# Patient Record
Sex: FEMALE | Race: BLACK OR AFRICAN AMERICAN | NOT HISPANIC OR LATINO | Employment: OTHER | URBAN - METROPOLITAN AREA
[De-identification: names, ages, dates, MRNs, and addresses within clinical notes are randomized per-mention and may not be internally consistent; named-entity substitution may affect disease eponyms.]

---

## 2017-01-25 DIAGNOSIS — I10 ESSENTIAL HYPERTENSION: ICD-10-CM

## 2017-01-25 DIAGNOSIS — F41.8 DEPRESSION WITH ANXIETY: ICD-10-CM

## 2017-01-25 RX ORDER — LOSARTAN POTASSIUM 50 MG/1
TABLET ORAL
Qty: 90 TABLET | Refills: 0 | Status: SHIPPED | OUTPATIENT
Start: 2017-01-25 | End: 2017-02-16 | Stop reason: SDUPTHER

## 2017-01-25 RX ORDER — PAROXETINE 30 MG/1
TABLET, FILM COATED ORAL
Qty: 90 TABLET | Refills: 0 | Status: SHIPPED | OUTPATIENT
Start: 2017-01-25 | End: 2017-03-02 | Stop reason: SDUPTHER

## 2017-02-16 DIAGNOSIS — M15.9 PRIMARY OSTEOARTHRITIS INVOLVING MULTIPLE JOINTS: ICD-10-CM

## 2017-02-16 DIAGNOSIS — I10 ESSENTIAL HYPERTENSION: ICD-10-CM

## 2017-02-16 RX ORDER — AMLODIPINE BESYLATE 10 MG/1
TABLET ORAL
Qty: 90 TABLET | Refills: 0 | Status: SHIPPED | OUTPATIENT
Start: 2017-02-16 | End: 2017-03-02 | Stop reason: SDUPTHER

## 2017-02-16 RX ORDER — LOSARTAN POTASSIUM 50 MG/1
TABLET ORAL
Qty: 90 TABLET | Refills: 0 | Status: SHIPPED | OUTPATIENT
Start: 2017-02-16 | End: 2017-03-02 | Stop reason: SDUPTHER

## 2017-02-16 RX ORDER — MELOXICAM 15 MG/1
TABLET ORAL
Qty: 90 TABLET | Refills: 0 | Status: SHIPPED | OUTPATIENT
Start: 2017-02-16 | End: 2017-07-19 | Stop reason: SDUPTHER

## 2017-02-23 DIAGNOSIS — F41.1 GAD (GENERALIZED ANXIETY DISORDER): ICD-10-CM

## 2017-02-23 RX ORDER — ALPRAZOLAM 2 MG/1
TABLET ORAL
Qty: 60 TABLET | Refills: 0 | Status: SHIPPED | OUTPATIENT
Start: 2017-02-23 | End: 2017-03-02 | Stop reason: SDUPTHER

## 2017-03-02 ENCOUNTER — OFFICE VISIT (OUTPATIENT)
Dept: INTERNAL MEDICINE | Facility: CLINIC | Age: 67
End: 2017-03-02
Payer: MEDICARE

## 2017-03-02 VITALS
DIASTOLIC BLOOD PRESSURE: 80 MMHG | BODY MASS INDEX: 27.93 KG/M2 | HEIGHT: 65 IN | WEIGHT: 167.63 LBS | HEART RATE: 70 BPM | SYSTOLIC BLOOD PRESSURE: 120 MMHG

## 2017-03-02 DIAGNOSIS — I10 ESSENTIAL HYPERTENSION: Primary | ICD-10-CM

## 2017-03-02 DIAGNOSIS — R73.03 PRE-DIABETES: ICD-10-CM

## 2017-03-02 DIAGNOSIS — F13.20 SEDATIVE, HYPNOTIC OR ANXIOLYTIC DEPENDENCE, UNCOMPLICATED: ICD-10-CM

## 2017-03-02 DIAGNOSIS — N39.41 URGE URINARY INCONTINENCE: ICD-10-CM

## 2017-03-02 DIAGNOSIS — F43.21 ADJUSTMENT DISORDER WITH DEPRESSED MOOD: ICD-10-CM

## 2017-03-02 DIAGNOSIS — F41.1 GAD (GENERALIZED ANXIETY DISORDER): ICD-10-CM

## 2017-03-02 LAB
BILIRUB UR QL STRIP: NEGATIVE
CLARITY UR REFRACT.AUTO: CLEAR
COLOR UR AUTO: NORMAL
GLUCOSE UR QL STRIP: NEGATIVE
HGB UR QL STRIP: NEGATIVE
KETONES UR QL STRIP: NEGATIVE
LEUKOCYTE ESTERASE UR QL STRIP: NEGATIVE
NITRITE UR QL STRIP: NEGATIVE
PH UR STRIP: 5 [PH] (ref 5–8)
PROT UR QL STRIP: NEGATIVE
SP GR UR STRIP: 1.02 (ref 1–1.03)
URN SPEC COLLECT METH UR: NORMAL
UROBILINOGEN UR STRIP-ACNC: NEGATIVE EU/DL

## 2017-03-02 PROCEDURE — 99999 PR PBB SHADOW E&M-EST. PATIENT-LVL III: CPT | Mod: PBBFAC,,, | Performed by: INTERNAL MEDICINE

## 2017-03-02 PROCEDURE — 3074F SYST BP LT 130 MM HG: CPT | Mod: S$GLB,,, | Performed by: INTERNAL MEDICINE

## 2017-03-02 PROCEDURE — 1126F AMNT PAIN NOTED NONE PRSNT: CPT | Mod: S$GLB,,, | Performed by: INTERNAL MEDICINE

## 2017-03-02 PROCEDURE — 1157F ADVNC CARE PLAN IN RCRD: CPT | Mod: S$GLB,,, | Performed by: INTERNAL MEDICINE

## 2017-03-02 PROCEDURE — 87086 URINE CULTURE/COLONY COUNT: CPT

## 2017-03-02 PROCEDURE — 3079F DIAST BP 80-89 MM HG: CPT | Mod: S$GLB,,, | Performed by: INTERNAL MEDICINE

## 2017-03-02 PROCEDURE — 1159F MED LIST DOCD IN RCRD: CPT | Mod: S$GLB,,, | Performed by: INTERNAL MEDICINE

## 2017-03-02 PROCEDURE — 99214 OFFICE O/P EST MOD 30 MIN: CPT | Mod: S$GLB,,, | Performed by: INTERNAL MEDICINE

## 2017-03-02 PROCEDURE — 1160F RVW MEDS BY RX/DR IN RCRD: CPT | Mod: S$GLB,,, | Performed by: INTERNAL MEDICINE

## 2017-03-02 PROCEDURE — 81003 URINALYSIS AUTO W/O SCOPE: CPT

## 2017-03-02 RX ORDER — PAROXETINE 30 MG/1
TABLET, FILM COATED ORAL
Qty: 90 TABLET | Refills: 2 | Status: SHIPPED | OUTPATIENT
Start: 2017-03-02 | End: 2017-03-02 | Stop reason: DRUGHIGH

## 2017-03-02 RX ORDER — PAROXETINE HYDROCHLORIDE 40 MG/1
40 TABLET, FILM COATED ORAL DAILY
Qty: 90 TABLET | Refills: 2 | Status: SHIPPED | OUTPATIENT
Start: 2017-03-02 | End: 2017-11-08 | Stop reason: SDUPTHER

## 2017-03-02 RX ORDER — LOSARTAN POTASSIUM 50 MG/1
50 TABLET ORAL DAILY
Qty: 90 TABLET | Refills: 2 | Status: SHIPPED | OUTPATIENT
Start: 2017-03-02 | End: 2017-12-19 | Stop reason: SDUPTHER

## 2017-03-02 RX ORDER — AMLODIPINE BESYLATE 10 MG/1
10 TABLET ORAL DAILY
Qty: 90 TABLET | Refills: 2 | Status: SHIPPED | OUTPATIENT
Start: 2017-03-02 | End: 2017-12-11 | Stop reason: SDUPTHER

## 2017-03-02 RX ORDER — ALPRAZOLAM 2 MG/1
2 TABLET ORAL 2 TIMES DAILY PRN
Qty: 60 TABLET | Refills: 5 | Status: SHIPPED | OUTPATIENT
Start: 2017-03-02 | End: 2017-09-11 | Stop reason: SDUPTHER

## 2017-03-02 NOTE — PROGRESS NOTES
Subjective:       Patient ID: Nicole Jacques is a 67 y.o. female.    Chief Complaint: Annual Exam    HPI Comments: Last seen by me 11 months ago. Had missed an appointment with me, and had follow up with ASHLI Turner 10/16 instead. Presents for f/u chronic medical conditions with no new complaints. Taking meds as prescribed. Home blood pressure readings are generally normal.    PMH: , misc x 1.    Hypertension.  Pre-Diabetes, HbA1c up to 6.3%.  GERD, EGD  only showed hiatal hernia.  Depression with Anxiety.    Lumbar Spondylosis.  Sigmoid Diverticulosis.     Colonoscopy 11/15 normal. Mammogram normal . Pelvic exam 10/16. BMD normal 12/15. Eye exam . Flu shot 10/16. EKG normal . Pneumovax . Prevnar 8/15. Has had Zostavax. Labs 10/16: CBC normal, CMP normal, HbA1c 6.0%, Vit D 32, TChol 207, TG 82, HDL 66, .    SOCIAL: No tobacco,  smokes. Social alcohol. No illicit drug usage.  with 2 adult children who live locally. Retired  at [employer].     PSH: BTL, Hysterectomy due to heavy menses, ovaries remain. Left knee arthroscopic surgery due to injury kick boxing. Appendectomy. Bilateral Cataract extractions.     FMH: Father  age 81 with bladder cancer and dementia. Mother  age 78 with hypertension and renal failure, and CHF, possible osteoporosis. Diabetes in grandmothers. Anxiety - mother, sister. Colon cancer in maternal uncle.    Allergies: Bactrim.    Medications: list reviewed and reconciled.             Review of Systems   Constitutional: Negative for activity change, appetite change, fatigue, fever and unexpected weight change.   HENT: Negative for congestion, hearing loss, rhinorrhea, sneezing, sore throat, trouble swallowing and voice change.    Eyes: Negative for pain, redness and visual disturbance.   Respiratory: Negative for cough, chest tightness, shortness of breath and wheezing.    Cardiovascular: Negative for chest pain,  palpitations and leg swelling.   Gastrointestinal: Negative for abdominal pain, blood in stool, constipation, diarrhea, nausea and vomiting.        Infrequent reflux symptoms, takes Omeprazole as needed.    Genitourinary: Positive for urgency. Negative for difficulty urinating, dysuria, flank pain, frequency and hematuria.        Chronic urge incontinence, c/o cost of Ditropan, Detrol and Vesicare. Using an OTC herbal supplement.   Musculoskeletal: Positive for arthralgias. Negative for back pain, joint swelling, myalgias and neck pain.        Uses Meloxicam intermittently, sometimes just Tylenol.   Skin: Negative for rash and wound.   Neurological: Negative for dizziness, syncope, facial asymmetry, speech difficulty, weakness, numbness and headaches.   Hematological: Negative for adenopathy. Does not bruise/bleed easily.   Psychiatric/Behavioral: Negative for confusion, dysphoric mood, sleep disturbance and suicidal ideas. The patient is nervous/anxious.         Depression is stable on current medication, but anxiety persists. She would like to try a dose adjustment of Paxil.       Objective:    /80, Pulse 70, Wt 167.6 lbs  Physical Exam   Constitutional: She is oriented to person, place, and time. She appears well-developed and well-nourished. No distress.   HENT:   Head: Normocephalic and atraumatic.   Right Ear: External ear normal.   Left Ear: External ear normal.   Nose: Nose normal.   Mouth/Throat: Oropharynx is clear and moist. No oropharyngeal exudate.   Eyes: Conjunctivae and EOM are normal. Pupils are equal, round, and reactive to light. Right conjunctiva is not injected. Left conjunctiva is not injected. No scleral icterus.   Neck: Normal range of motion. Neck supple. No JVD present. Carotid bruit is not present. No thyromegaly present.   Cardiovascular: Normal rate, regular rhythm and normal heart sounds.  Exam reveals no gallop and no friction rub.    No murmur heard.  Pulmonary/Chest: Effort  normal and breath sounds normal. No respiratory distress. She has no wheezes. She has no rhonchi. She has no rales.   Abdominal: Soft. Bowel sounds are normal. She exhibits no distension and no mass. There is no hepatosplenomegaly. There is no tenderness. There is no CVA tenderness. No hernia.   Musculoskeletal: Normal range of motion. She exhibits no edema, tenderness or deformity.   Lymphadenopathy:     She has no cervical adenopathy.   Neurological: She is alert and oriented to person, place, and time. She has normal strength. No cranial nerve deficit. She exhibits normal muscle tone. Coordination and gait normal.   Mild essential tremor.   Skin: Skin is warm and dry. No lesion and no rash noted. She is not diaphoretic. No cyanosis or erythema. No pallor. Nails show no clubbing.   Psychiatric: She has a normal mood and affect. Her behavior is normal. Judgment and thought content normal.   Vitals reviewed.      Assessment:       1. Essential hypertension    2. Pre-diabetes    3. Urge urinary incontinence    4. ROSALIA (generalized anxiety disorder)    5. Adjustment disorder with depressed mood    6. Sedative, hypnotic or anxiolytic dependence, uncomplicated        Plan:       Essential hypertension controlled.  -     amlodipine (NORVASC) 10 MG tablet; Take 1 tablet (10 mg total) by mouth once daily.  Dispense: 90 tablet; Refill: 2  -     losartan (COZAAR) 50 MG tablet; Take 1 tablet (50 mg total) by mouth once daily.  Dispense: 90 tablet; Refill: 2    Pre-diabetes - continue diet and exercise.     Urge urinary incontinence  -     Urinalysis  -     Urine culture    ROSALIA (generalized anxiety disorder)  -     alprazolam (XANAX) 2 MG Tab; Take 1 tablet (2 mg total) by mouth 2 (two) times daily as needed.  Dispense: 60 tablet; Refill: 5    Adjustment disorder with depressed mood  -     Discontinue: paroxetine (PAXIL) 30 MG tablet; TAKE 1 TABLET ONE TIME DAILY  Dispense: 90 tablet; Refill: 2  -     paroxetine (PAXIL) 40 MG  tablet; Take 1 tablet (40 mg total) by mouth once daily.  Dispense: 90 tablet; Refill: 2    Sedative, hypnotic or anxiolytic dependence, uncomplicated

## 2017-03-02 NOTE — MR AVS SNAPSHOT
[facility]  [address]  [facility] [address]  Phone: [phone]  Fax: [phone]                  Nicole Jacques   3/2/2017 1:00 PM   Office Visit    Description:  Female : 1950   Provider:  Zara Sorensen MD   Department:  [facility]           Reason for Visit     Annual Exam           Diagnoses this Visit        Comments    Essential hypertension    -  Primary     Pre-diabetes         Urge urinary incontinence         ROSALIA (generalized anxiety disorder)                To Do List           Goals (5 Years of Data)     None      Follow-Up and Disposition     Return in about 6 months (around 2017).       These Medications        Disp Refills Start End    alprazolam (XANAX) 2 MG Tab 60 tablet 5 3/2/2017     Take 1 tablet (2 mg total) by mouth 2 (two) times daily as needed. - Oral    Pharmacy: [facility] - [place] [id number] [facility] Ph #: [phone]         [facility] On Call     [facility] On Call Nurse Care Line -  Assistance  Registered nurses in the [facility] On Call Center provide clinical advisement, health education, appointment booking, and other advisory services.  Call for this free service at [phone].             Medications           Message regarding Medications     Verify the changes and/or additions to your medication regime listed below are the same as discussed with your clinician today.  If any of these changes or additions are incorrect, please notify your healthcare provider.        CHANGE how you are taking these medications     Start Taking Instead of    alprazolam (XANAX) 2 MG Tab alprazolam (XANAX) 2 MG Tab    Dosage:  Take 1 tablet (2 mg total) by mouth 2 (two) times daily as needed. Dosage:  TAKE 1 TABLET BY MOUTH TWICE DAILY    Reason for Change:  Reorder       STOP taking these medications     phenylephrine-acetaminophen 5-325 mg Tab            Verify that the below list of  medications is an accurate representation of the medications you are currently taking.  If none reported, the list may be blank. If incorrect, please contact your healthcare provider. Carry this list with you in case of emergency.           Current Medications     alprazolam (XANAX) 2 MG Tab Take 1 tablet (2 mg total) by mouth 2 (two) times daily as needed.    amlodipine (NORVASC) 10 MG tablet TAKE 1 TABLET EVERY DAY    ascorbic acid (VITAMIN C) 1000 MG tablet Take 2 tablets by mouth.    co-enzyme Q-10 30 mg capsule     estradiol (ESTRACE) 1 MG tablet TAKE 1 TABLET EVERY DAY    losartan (COZAAR) 50 MG tablet TAKE 1 TABLET EVERY DAY    LUBRICANT EYE DROPS, GLYC-PG, 1-0.3 % Drop     meloxicam (MOBIC) 15 MG tablet TAKE 1 TABLET ONE TIME DAILY    MULTIVITAMIN (MULTIPLE VITAMIN ESSENTIAL ORAL) Take 1 tablet by mouth.    omega-3 fatty acids (FISH OIL) 500 mg Cap Take by mouth.    omeprazole (PRILOSEC) 20 MG capsule TAKE 1 CAPSULE (20 MG TOTAL) BY MOUTH ONCE DAILY.    paroxetine (PAXIL) 30 MG tablet TAKE 1 TABLET ONE TIME DAILY    vitamin A palmitate-vitamin D2 10,000-400 unit Tab            Clinical Reference Information           Your Vitals Were     BP                   120/80           Blood Pressure          Most Recent Value    BP  120/80      Allergies as of 3/2/2017     Sulfa (Sulfonamide Antibiotics)    Sulfamethoxazole-trimethoprim      Immunizations Administered on Date of Encounter - 3/2/2017     None      Orders Placed During Today's Visit      Normal Orders This Visit    Urinalysis     Urine culture       Language Assistance Services     ATTENTION: Language assistance services are available, free of charge. Please call [phone].      ATENCIÓN: Si habla qianañol, tiene a leroy disposición servicios gratuitos de asistencia lingüística. Llame al [phone].     CHÚ Ý: N?u b?n nói Ti?ng Vi?t, có các d?ch v? h? tr? ngôn ng? mi?n phí dành cho b?n. G?i s? [phone].         [facility]  complies with applicable Federal civil rights laws and does not discriminate on the basis of race, color, national origin, age, disability, or sex.

## 2017-03-03 LAB — BACTERIA UR CULT: NO GROWTH

## 2017-03-22 ENCOUNTER — PATIENT MESSAGE (OUTPATIENT)
Dept: INTERNAL MEDICINE | Facility: CLINIC | Age: 67
End: 2017-03-22

## 2017-03-31 ENCOUNTER — TELEPHONE (OUTPATIENT)
Dept: INTERNAL MEDICINE | Facility: CLINIC | Age: 67
End: 2017-03-31

## 2017-03-31 DIAGNOSIS — N39.41 URGE URINARY INCONTINENCE: Primary | ICD-10-CM

## 2017-03-31 RX ORDER — OXYBUTYNIN CHLORIDE 5 MG/1
5 TABLET ORAL 2 TIMES DAILY
Qty: 180 TABLET | Refills: 3 | Status: SHIPPED | OUTPATIENT
Start: 2017-03-31 | End: 2017-10-10

## 2017-03-31 NOTE — TELEPHONE ENCOUNTER
----- Message from Cheri Keller sent at 3/29/2017  4:37 PM CDT -----  Contact: Patient  Patient would like to get medical advice.  Symptoms (please be specific):  incontinence  How long has patient had these symptoms:    Pharmacy name and phone #:  [facility] - [place], [place] - [address] [facility] [phone] (Phone) [phone] (Fax)  Any drug allergies:  Please see chart.   Comments:  The patient would like oxybutynin 5 mg sent to [facility].      Thanks!

## 2017-03-31 NOTE — TELEPHONE ENCOUNTER
Pt state the oxybutynin 5 mg  is a preferred med for [employer] can you please send a RX for it, she could not pay for it previously pl adv.

## 2017-06-16 ENCOUNTER — OFFICE VISIT (OUTPATIENT)
Dept: OPTOMETRY | Facility: CLINIC | Age: 67
End: 2017-06-16
Payer: MEDICARE

## 2017-06-16 DIAGNOSIS — Z96.1 PSEUDOPHAKIA OF BOTH EYES: ICD-10-CM

## 2017-06-16 DIAGNOSIS — Z13.5 GLAUCOMA SCREENING: ICD-10-CM

## 2017-06-16 DIAGNOSIS — H52.4 PRESBYOPIA OU: ICD-10-CM

## 2017-06-16 DIAGNOSIS — H02.403 PTOSIS, BOTH EYELIDS: Primary | ICD-10-CM

## 2017-06-16 PROCEDURE — 92014 COMPRE OPH EXAM EST PT 1/>: CPT | Mod: S$GLB,,, | Performed by: OPTOMETRIST

## 2017-06-16 PROCEDURE — 92015 DETERMINE REFRACTIVE STATE: CPT | Mod: S$GLB,,, | Performed by: OPTOMETRIST

## 2017-06-16 PROCEDURE — 99999 PR PBB SHADOW E&M-EST. PATIENT-LVL II: CPT | Mod: PBBFAC,,, | Performed by: OPTOMETRIST

## 2017-06-16 RX ORDER — RESVER/WINE/BFL/GRPSD/PC/C/POM 200MG-60MG
5000 CAPSULE ORAL EVERY MORNING
COMMUNITY
Start: 2017-04-20

## 2017-06-16 RX ORDER — B-COMPLEX WITH VITAMIN C
1 TABLET ORAL EVERY MORNING
COMMUNITY
Start: 2017-04-20

## 2017-06-16 NOTE — PROGRESS NOTES
HPI     DLS: 6/15/2016  Pt states no va changes  +itching   +Dry eyes   Denies f/f    Liquid Tears ou BID/TID        Last edited by Maria Guadalupe Cross on 6/16/2017  1:09 PM. (History)        ROS     Positive for: Gastrointestinal, Cardiovascular, Eyes    Negative for: Constitutional, Neurological, Skin, Genitourinary,   Musculoskeletal, HENT, Endocrine, Respiratory, Psychiatric, Allergic/Imm,   Heme/Lymph    Last edited by Scott Box, OD on 6/16/2017  1:20 PM. (History)        Assessment /Plan     For exam results, see Encounter Report.    Ptosis, both eyelids    Pseudophakia of both eyes    Glaucoma screening    Presbyopia OU          1. Mild pco sp pciol OU--pt happy w otc readers  2. Dry eyes--advised SYS BAL or SOOTHE XP ATs QID  3. Family History of Glaucoma.  Advised yearly exam   4. Ptosis OU--pt wishes surgery if is a candidate    PLAN:    1. Eyelid consult--ptosis repair eval  2. rtc 1 yr to me

## 2017-07-18 ENCOUNTER — CLINICAL SUPPORT (OUTPATIENT)
Dept: OPHTHALMOLOGY | Facility: CLINIC | Age: 67
End: 2017-07-18
Payer: MEDICARE

## 2017-07-18 ENCOUNTER — INITIAL CONSULT (OUTPATIENT)
Dept: OPHTHALMOLOGY | Facility: CLINIC | Age: 67
End: 2017-07-18
Payer: MEDICARE

## 2017-07-18 DIAGNOSIS — H02.423 MYOGENIC PTOSIS OF BILATERAL EYELIDS: ICD-10-CM

## 2017-07-18 DIAGNOSIS — H02.403 PTOSIS OF EYELID, BILATERAL: Primary | ICD-10-CM

## 2017-07-18 DIAGNOSIS — H02.839 DERMATOCHALASIS OF EYELID: ICD-10-CM

## 2017-07-18 PROCEDURE — 99999 PR PBB SHADOW E&M-EST. PATIENT-LVL II: CPT | Mod: PBBFAC,,, | Performed by: OPHTHALMOLOGY

## 2017-07-18 PROCEDURE — 92285 EXTERNAL OCULAR PHOTOGRAPHY: CPT | Mod: S$GLB,,, | Performed by: OPHTHALMOLOGY

## 2017-07-18 PROCEDURE — 92082 INTERMEDIATE VISUAL FIELD XM: CPT | Mod: S$GLB,,, | Performed by: OPHTHALMOLOGY

## 2017-07-18 PROCEDURE — 92014 COMPRE OPH EXAM EST PT 1/>: CPT | Mod: S$GLB,,, | Performed by: OPHTHALMOLOGY

## 2017-07-18 RX ORDER — NEOMYCIN/POLYMYXIN B/PRAMOXINE 3.5-10K-1
CREAM (GRAM) TOPICAL
COMMUNITY
Start: 2017-07-10 | End: 2017-11-12

## 2017-07-18 RX ORDER — ACETAMINOPHEN 500MG/15ML
LIQUID (ML) ORAL
COMMUNITY
Start: 2017-07-10 | End: 2017-11-12

## 2017-07-18 RX ORDER — ACETAMINOPHEN 650 MG
TABLET, EXTENDED RELEASE ORAL
COMMUNITY
Start: 2017-07-10 | End: 2017-07-18 | Stop reason: SDUPTHER

## 2017-07-18 NOTE — PROGRESS NOTES
HPI     Ptosis    Additional comments: Pt here for ptosis xochitl Box            Comments   Ptosis xochitl Box   Pt states she first noticed her lids drooping in pictures, and eventually   they have gotten worse and she now notices them especially when watching   tv. Pt states when she raises her lids her vision increases significantly  Pt denies eye pain  Meds: AT prn        Last edited by Pavithra Maher, PCT on 7/18/2017  1:34 PM. (History)            Assessment /Plan     For exam results, see Encounter Report.    Ptosis of eyelid, bilateral  -     Goldmann Perimetry - OU - Intermediate - Both Eyes  -     External Photography - OU - Both Eyes    Myogenic ptosis of bilateral eyelids    Dermatochalasis of eyelid      Plan for bilateral MMCR 10/12 along with conservative blepharoplasty.     Informed consent obtained after extensive risks/benefits/alternatives were discussed with the patient including but not limited to pain, bleeding, infection, ocular injury, loss of the eye, asymmetry, need for revision in future, scarring.  Alternatives such as waiting were discussed.  All questions were answered.      Hold ASA, NSAIDS, and fish oil 5 to 7 days prior to procedure.    Return for surgery

## 2017-07-18 NOTE — LETTER
July 18, 2017      Zara Sorensen MD  [address]  [facility] [address]           [facility]  [address]  [facility] [address]  Phone: [phone]  Fax: [phone]          Patient: Nicole Jacques   MR Number: [id number]   YOB: 1950   Date of Visit: 7/18/2017       Dear Dr. Zara Sorensen:    Thank you for referring Nicole Jacques to me for evaluation. Attached you will find relevant portions of my assessment and plan of care.    If you have questions, please do not hesitate to call me. I look forward to following Nicole Jacques along with you.    Sincerely,    Jamila Berman MD    Enclosure  CC:  No Recipients    If you would like to receive this communication electronically, please contact [email or web address] or [phone] to request more information on [employer] Link access.    For providers and/or their staff who would like to refer a patient to [facility], please contact us through our one-stop-shop provider referral line, [facility], at [phone].    If you feel you have received this communication in error or would no longer like to receive these types of communications, please e-mail [email or web address]

## 2017-07-19 DIAGNOSIS — M15.9 PRIMARY OSTEOARTHRITIS INVOLVING MULTIPLE JOINTS: ICD-10-CM

## 2017-07-19 RX ORDER — MELOXICAM 15 MG/1
TABLET ORAL
Qty: 90 TABLET | Refills: 0 | Status: SHIPPED | OUTPATIENT
Start: 2017-07-19 | End: 2017-09-28 | Stop reason: SDUPTHER

## 2017-08-07 ENCOUNTER — TELEPHONE (OUTPATIENT)
Dept: OPHTHALMOLOGY | Facility: CLINIC | Age: 67
End: 2017-08-07

## 2017-08-07 DIAGNOSIS — H02.839 DERMATOCHALASIS OF EYELID: ICD-10-CM

## 2017-08-07 DIAGNOSIS — H02.423 MYOGENIC PTOSIS OF BILATERAL EYELIDS: ICD-10-CM

## 2017-08-07 DIAGNOSIS — H02.403 PTOSIS OF EYELID, BILATERAL: Primary | ICD-10-CM

## 2017-09-07 ENCOUNTER — OFFICE VISIT (OUTPATIENT)
Dept: INTERNAL MEDICINE | Facility: CLINIC | Age: 67
End: 2017-09-07
Payer: MEDICARE

## 2017-09-07 ENCOUNTER — TELEPHONE (OUTPATIENT)
Dept: INTERNAL MEDICINE | Facility: CLINIC | Age: 67
End: 2017-09-07

## 2017-09-07 ENCOUNTER — LAB VISIT (OUTPATIENT)
Dept: LAB | Facility: HOSPITAL | Age: 67
End: 2017-09-07
Attending: PHYSICIAN ASSISTANT
Payer: MEDICARE

## 2017-09-07 VITALS
BODY MASS INDEX: 28.17 KG/M2 | HEART RATE: 64 BPM | DIASTOLIC BLOOD PRESSURE: 74 MMHG | HEIGHT: 65 IN | WEIGHT: 169.06 LBS | SYSTOLIC BLOOD PRESSURE: 112 MMHG

## 2017-09-07 DIAGNOSIS — I10 HYPERTENSION, ESSENTIAL: ICD-10-CM

## 2017-09-07 DIAGNOSIS — R73.03 PRE-DIABETES: ICD-10-CM

## 2017-09-07 DIAGNOSIS — E55.9 VITAMIN D DEFICIENCY: ICD-10-CM

## 2017-09-07 DIAGNOSIS — H69.93 EUSTACHIAN TUBE DYSFUNCTION, BILATERAL: ICD-10-CM

## 2017-09-07 DIAGNOSIS — R32 URINARY INCONTINENCE, UNSPECIFIED TYPE: Primary | ICD-10-CM

## 2017-09-07 LAB
25(OH)D3+25(OH)D2 SERPL-MCNC: 51 NG/ML
ALBUMIN SERPL BCP-MCNC: 3.9 G/DL
ALP SERPL-CCNC: 49 U/L
ALT SERPL W/O P-5'-P-CCNC: 18 U/L
ANION GAP SERPL CALC-SCNC: 8 MMOL/L
AST SERPL-CCNC: 20 U/L
BASOPHILS # BLD AUTO: 0.01 K/UL
BASOPHILS NFR BLD: 0.2 %
BILIRUB SERPL-MCNC: 0.4 MG/DL
BUN SERPL-MCNC: 23 MG/DL
CALCIUM SERPL-MCNC: 9.4 MG/DL
CHLORIDE SERPL-SCNC: 106 MMOL/L
CHOLEST SERPL-MCNC: 199 MG/DL
CHOLEST/HDLC SERPL: 2.7 {RATIO}
CO2 SERPL-SCNC: 26 MMOL/L
CREAT SERPL-MCNC: 0.8 MG/DL
DIFFERENTIAL METHOD: NORMAL
EOSINOPHIL # BLD AUTO: 0.1 K/UL
EOSINOPHIL NFR BLD: 2 %
ERYTHROCYTE [DISTWIDTH] IN BLOOD BY AUTOMATED COUNT: 13.4 %
EST. GFR  (AFRICAN AMERICAN): >60 ML/MIN/1.73 M^2
EST. GFR  (NON AFRICAN AMERICAN): >60 ML/MIN/1.73 M^2
ESTIMATED AVG GLUCOSE: 114 MG/DL
GLUCOSE SERPL-MCNC: 96 MG/DL
HBA1C MFR BLD HPLC: 5.6 %
HCT VFR BLD AUTO: 38.2 %
HDLC SERPL-MCNC: 75 MG/DL
HDLC SERPL: 37.7 %
HGB BLD-MCNC: 12.6 G/DL
LDLC SERPL CALC-MCNC: 107.8 MG/DL
LYMPHOCYTES # BLD AUTO: 1.6 K/UL
LYMPHOCYTES NFR BLD: 31.2 %
MCH RBC QN AUTO: 30.1 PG
MCHC RBC AUTO-ENTMCNC: 33 G/DL
MCV RBC AUTO: 91 FL
MONOCYTES # BLD AUTO: 0.4 K/UL
MONOCYTES NFR BLD: 8.4 %
NEUTROPHILS # BLD AUTO: 3 K/UL
NEUTROPHILS NFR BLD: 58 %
NONHDLC SERPL-MCNC: 124 MG/DL
PLATELET # BLD AUTO: 224 K/UL
PMV BLD AUTO: 11.5 FL
POTASSIUM SERPL-SCNC: 4.5 MMOL/L
PROT SERPL-MCNC: 7.6 G/DL
RBC # BLD AUTO: 4.18 M/UL
SODIUM SERPL-SCNC: 140 MMOL/L
TRIGL SERPL-MCNC: 81 MG/DL
TSH SERPL DL<=0.005 MIU/L-ACNC: 1.16 UIU/ML
WBC # BLD AUTO: 5.09 K/UL

## 2017-09-07 PROCEDURE — 3074F SYST BP LT 130 MM HG: CPT | Mod: S$GLB,,, | Performed by: PHYSICIAN ASSISTANT

## 2017-09-07 PROCEDURE — 85025 COMPLETE CBC W/AUTO DIFF WBC: CPT

## 2017-09-07 PROCEDURE — 82306 VITAMIN D 25 HYDROXY: CPT

## 2017-09-07 PROCEDURE — 99214 OFFICE O/P EST MOD 30 MIN: CPT | Mod: S$GLB,,, | Performed by: PHYSICIAN ASSISTANT

## 2017-09-07 PROCEDURE — 1159F MED LIST DOCD IN RCRD: CPT | Mod: S$GLB,,, | Performed by: PHYSICIAN ASSISTANT

## 2017-09-07 PROCEDURE — 1126F AMNT PAIN NOTED NONE PRSNT: CPT | Mod: S$GLB,,, | Performed by: PHYSICIAN ASSISTANT

## 2017-09-07 PROCEDURE — 84443 ASSAY THYROID STIM HORMONE: CPT

## 2017-09-07 PROCEDURE — 80053 COMPREHEN METABOLIC PANEL: CPT

## 2017-09-07 PROCEDURE — 83036 HEMOGLOBIN GLYCOSYLATED A1C: CPT

## 2017-09-07 PROCEDURE — 80061 LIPID PANEL: CPT

## 2017-09-07 PROCEDURE — 3008F BODY MASS INDEX DOCD: CPT | Mod: S$GLB,,, | Performed by: PHYSICIAN ASSISTANT

## 2017-09-07 PROCEDURE — 36415 COLL VENOUS BLD VENIPUNCTURE: CPT

## 2017-09-07 PROCEDURE — 99499 UNLISTED E&M SERVICE: CPT | Mod: S$GLB,,, | Performed by: PHYSICIAN ASSISTANT

## 2017-09-07 PROCEDURE — 99999 PR PBB SHADOW E&M-EST. PATIENT-LVL V: CPT | Mod: PBBFAC,,, | Performed by: PHYSICIAN ASSISTANT

## 2017-09-07 PROCEDURE — 3078F DIAST BP <80 MM HG: CPT | Mod: S$GLB,,, | Performed by: PHYSICIAN ASSISTANT

## 2017-09-07 NOTE — PATIENT INSTRUCTIONS
Treating Incontinence in Women: Non-surgical Methods    The best treatment for you will depend on the type of incontinence you have. Your symptoms, age, and any underlying problems that are found also affect your treatment. While some types of incontinence may eventually require surgery, non-surgical treatments may be effective in many cases. Non-surgical treatments include lifestyle changes, muscle-strengthening exercises, and medications.  Treatment recommendations for stress urinary incontinence  Guidelines from the American College of Physicians for treating stress urinary incontinence include:  · Pelvic floor strengthening exercises (called Kegel exercises)  · Kegel exercises and bladder training  · Medications for urgency incontinence if bladder training has not helped  · Lifestyle changes such as weight loss and increased activity if incontinence is due to being overweight  Lifestyle changes  · Quitting smoking. Smoking can lead to a chronic cough that strains pelvic floor muscles. Smoking may also damage the bladder and urethra.  · Losing weight. Excess weight puts extra pressure on the pelvic floor muscles. Exercising and eating right can help you lose weight. This helps other treatments work better.  · Making certain diet changes. Some foods may make you need to urinate more, so it may be good to avoid them. These include caffeinated drinks and alcohol. Ask your doctor whether these or other diet changes might be helpful.  Kegel exercises  Kegel exercises help strengthen the pelvic floor muscles. The pelvic floor muscles act as a sling to help hold the bladder and urethra in place. These muscles also help keep the urethra closed. Weak pelvic floor muscles may allow urine to leak. To strengthen the pelvic floor muscles, do Kegel exercises daily. In a few months, the muscles will be stronger and tighter. This can help prevent urine leakage.  Date Last Reviewed: 1/2/2015  © 1427-2444 [employer] [address] [place] [address]. All rights reserved. This information is not intended as a substitute for professional medical care. Always follow your healthcare professional's instructions.
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
## 2017-09-07 NOTE — TELEPHONE ENCOUNTER
Patient has pre-op clearance she would like completed.  She brought in a form.  Please call her to let her know if she needs an office visit with Dr. Sorensen first.  She has an upcoming eye surgery planned 10/27/17.  I will bring the form to you Jackson

## 2017-09-11 DIAGNOSIS — F41.1 GAD (GENERALIZED ANXIETY DISORDER): ICD-10-CM

## 2017-09-12 RX ORDER — ALPRAZOLAM 2 MG/1
TABLET ORAL
Qty: 60 TABLET | Refills: 2 | Status: SHIPPED | OUTPATIENT
Start: 2017-09-12 | End: 2017-12-09 | Stop reason: SDUPTHER

## 2017-09-13 ENCOUNTER — PATIENT MESSAGE (OUTPATIENT)
Dept: INTERNAL MEDICINE | Facility: CLINIC | Age: 67
End: 2017-09-13

## 2017-09-15 ENCOUNTER — TELEPHONE (OUTPATIENT)
Dept: OPHTHALMOLOGY | Facility: CLINIC | Age: 67
End: 2017-09-15

## 2017-09-15 DIAGNOSIS — H02.423 MYOGENIC PTOSIS OF BILATERAL EYELIDS: ICD-10-CM

## 2017-09-15 DIAGNOSIS — H02.403 PTOSIS OF EYELID, BILATERAL: Primary | ICD-10-CM

## 2017-09-15 DIAGNOSIS — H02.839 DERMATOCHALASIS OF EYELID: ICD-10-CM

## 2017-09-28 DIAGNOSIS — M15.9 PRIMARY OSTEOARTHRITIS INVOLVING MULTIPLE JOINTS: ICD-10-CM

## 2017-09-28 DIAGNOSIS — K21.9 GASTROESOPHAGEAL REFLUX DISEASE, ESOPHAGITIS PRESENCE NOT SPECIFIED: ICD-10-CM

## 2017-09-28 RX ORDER — OMEPRAZOLE 20 MG/1
CAPSULE, DELAYED RELEASE ORAL
Qty: 90 CAPSULE | Refills: 0 | Status: SHIPPED | OUTPATIENT
Start: 2017-09-28 | End: 2017-12-11 | Stop reason: SDUPTHER

## 2017-09-28 RX ORDER — MELOXICAM 15 MG/1
TABLET ORAL
Qty: 90 TABLET | Refills: 0 | Status: SHIPPED | OUTPATIENT
Start: 2017-09-28 | End: 2017-12-11 | Stop reason: SDUPTHER

## 2017-10-06 ENCOUNTER — TELEPHONE (OUTPATIENT)
Dept: INTERNAL MEDICINE | Facility: CLINIC | Age: 67
End: 2017-10-06

## 2017-10-09 ENCOUNTER — TELEPHONE (OUTPATIENT)
Dept: INTERNAL MEDICINE | Facility: CLINIC | Age: 67
End: 2017-10-09

## 2017-10-09 NOTE — TELEPHONE ENCOUNTER
----- Message from Syeda Phillips sent at 10/9/2017 10:31 AM CDT -----  Contact: pt [phone]  Pt is returning call from office

## 2017-10-10 ENCOUNTER — OFFICE VISIT (OUTPATIENT)
Dept: UROLOGY | Facility: CLINIC | Age: 67
End: 2017-10-10
Payer: MEDICARE

## 2017-10-10 VITALS
SYSTOLIC BLOOD PRESSURE: 119 MMHG | BODY MASS INDEX: 28.36 KG/M2 | WEIGHT: 170.19 LBS | DIASTOLIC BLOOD PRESSURE: 75 MMHG | HEIGHT: 65 IN | HEART RATE: 71 BPM

## 2017-10-10 DIAGNOSIS — N39.41 URGE URINARY INCONTINENCE: Primary | ICD-10-CM

## 2017-10-10 DIAGNOSIS — I10 ESSENTIAL HYPERTENSION: ICD-10-CM

## 2017-10-10 LAB
BILIRUB SERPL-MCNC: NORMAL MG/DL
BLOOD URINE, POC: NORMAL
CANDIDA RRNA VAG QL PROBE: POSITIVE
COLOR, POC UA: NORMAL
G VAGINALIS RRNA GENITAL QL PROBE: NEGATIVE
GLUCOSE UR QL STRIP: NORMAL
KETONES UR QL STRIP: NORMAL
LEUKOCYTE ESTERASE URINE, POC: NORMAL
NITRITE, POC UA: NORMAL
PH, POC UA: 5
PROTEIN, POC: NORMAL
SPECIFIC GRAVITY, POC UA: 1.02
T VAGINALIS RRNA GENITAL QL PROBE: NEGATIVE
UROBILINOGEN, POC UA: NORMAL

## 2017-10-10 PROCEDURE — 87088 URINE BACTERIA CULTURE: CPT

## 2017-10-10 PROCEDURE — 99999 PR PBB SHADOW E&M-EST. PATIENT-LVL III: CPT | Mod: PBBFAC,,, | Performed by: UROLOGY

## 2017-10-10 PROCEDURE — 87086 URINE CULTURE/COLONY COUNT: CPT

## 2017-10-10 PROCEDURE — 99204 OFFICE O/P NEW MOD 45 MIN: CPT | Mod: 25,S$GLB,, | Performed by: UROLOGY

## 2017-10-10 PROCEDURE — 51701 INSERT BLADDER CATHETER: CPT | Mod: S$GLB,,, | Performed by: UROLOGY

## 2017-10-10 PROCEDURE — 87077 CULTURE AEROBIC IDENTIFY: CPT

## 2017-10-10 PROCEDURE — 81001 URINALYSIS AUTO W/SCOPE: CPT | Mod: S$GLB,,, | Performed by: UROLOGY

## 2017-10-10 PROCEDURE — 87480 CANDIDA DNA DIR PROBE: CPT

## 2017-10-10 PROCEDURE — 87660 TRICHOMONAS VAGIN DIR PROBE: CPT

## 2017-10-10 PROCEDURE — 87186 SC STD MICRODIL/AGAR DIL: CPT

## 2017-10-10 PROCEDURE — 99499 UNLISTED E&M SERVICE: CPT | Mod: S$GLB,,, | Performed by: UROLOGY

## 2017-10-10 RX ORDER — OXYBUTYNIN CHLORIDE 10 MG/1
10 TABLET, EXTENDED RELEASE ORAL DAILY
Qty: 30 TABLET | Refills: 12 | Status: SHIPPED | OUTPATIENT
Start: 2017-10-10 | End: 2018-04-17 | Stop reason: SINTOL

## 2017-10-10 NOTE — PROGRESS NOTES
Subjective:       Patient ID: Nicole Jacques is a 67 y.o. female.    Chief Complaint: Urinary Incontinence    Nicole Jacques is a 67 y.o. Female referred from Mei Turner PA-C for urinary incontinence for years.   She has history of being on oxbutynin.   She does drink a lot of water.     She did price out myrbetriq and it was really expensive.     Has urgency and urge incontinence. 2 pads a day. Not soaked on oxybutynin. She was having nocturia and nocturnal enuresis when not on this med.   She is on oxybutynin 5mg po BID. Lots of dry mouth and constipation.  She has had a foul odor to the urine.   No discharge.     She is very active and plays tennis 3 times a week.     Nocturia x 1-2.   Some frequency when drinking water.     No gross hematuria.   No recurrent UTI.     Sexually active. No pain with intercourse.   No stress incontinence.     Some constipation.         Past Surgical History:   Procedure Laterality Date    APPENDECTOMY      CATARACT EXTRACTION W/  INTRAOCULAR LENS IMPLANT Left 06/03/14    Dr Drake    CATARACT EXTRACTION W/  INTRAOCULAR LENS IMPLANT Right 7/7/14    COLONOSCOPY N/A 11/2/2015    Procedure: COLONOSCOPY;  Surgeon: Desmond Casillas MD;  Location: [address]);  Service: Endoscopy;  Laterality: N/A;    EYE SURGERY      HYSTERECTOMY      partial    KNEE SURGERY Left     TUBAL LIGATION         Past Medical History:   Diagnosis Date    Anxiety     Cataract     Depression     Family history of colon cancer     GERD (gastroesophageal reflux disease)     Hypertension     Lumbar spondylosis     Overactive bladder        Social History     Social History    Marital status:      Spouse name: N/A    Number of children: N/A    Years of education: N/A     Occupational History    Not on file.     Social History Main Topics    Smoking status: Passive Smoke Exposure - Never Smoker    Smokeless tobacco: Never Used    Alcohol use Yes      Comment: Social.     Drug use: No    Sexual activity: Yes     Partners: Male     Birth control/ protection: None     Other Topics Concern    Not on file     Social History Narrative    No narrative on file       Family History   Problem Relation Age of Onset    Hypertension Mother     Kidney disease Mother     Heart disease Mother     Dementia Father     Cancer Father      bladder    Diabetes Maternal Grandmother     Diabetes Paternal Grandmother     Cancer Maternal Uncle      colon cancer    Hypertension Sister     Diabetes Sister     Diabetes Brother     Hypertension Brother     No Known Problems Daughter     No Known Problems Son     Hypertension Sister     Cancer Sister      breast cancer    Amblyopia Neg Hx     Blindness Neg Hx     Cataracts Neg Hx     Glaucoma Neg Hx     Macular degeneration Neg Hx     Retinal detachment Neg Hx     Strabismus Neg Hx        Current Outpatient Prescriptions   Medication Sig Dispense Refill    alprazolam (XANAX) 2 MG Tab TAKE 1 TABLET BY MOUTH TWICE DAILY AS NEEDED 60 tablet 2    amlodipine (NORVASC) 10 MG tablet Take 1 tablet (10 mg total) by mouth once daily. 90 tablet 2    ascorbic acid (VITAMIN C) 1000 MG tablet Take 2 tablets by mouth.      B-complex with vitamin C (Z-BEC OR EQUIV) tablet       calcium carbonate-vitamin D3 600 mg(1,500mg) -100 unit Cap       co-enzyme Q-10 30 mg capsule       estradiol (ESTRACE) 1 MG tablet TAKE 1 TABLET EVERY DAY 90 tablet 3    losartan (COZAAR) 50 MG tablet Take 1 tablet (50 mg total) by mouth once daily. 90 tablet 2    LUBRICANT EYE DROPS, GLYC-PG, 1-0.3 % Drop       meloxicam (MOBIC) 15 MG tablet TAKE 1 TABLET EVERY DAY 90 tablet 0    MULTIVITAMIN (MULTIPLE VITAMIN ESSENTIAL ORAL) Take 1 tablet by mouth.      omega-3 fatty acids (FISH OIL) 500 mg Cap Take by mouth.      omeprazole (PRILOSEC) 20 MG capsule TAKE 1 CAPSULE ONE TIME DAILY 90 capsule 0    oxybutynin (DITROPAN) 5 MG Tab Take 1 tablet (5 mg total) by mouth 2  (two) times daily. 180 tablet 3    paroxetine (PAXIL) 40 MG tablet Take 1 tablet (40 mg total) by mouth once daily. 90 tablet 2    vitamin A palmitate-vitamin D2 10,000-400 unit Tab       VITAMIN D3 5,000 unit Tab       acetaminophen 500 mg/15 mL Liqd       melatonin 5 mg/15 mL Liqd        No current facility-administered medications for this visit.        Review of patient's allergies indicates:   Allergen Reactions    Sulfa (sulfonamide antibiotics) Itching    Sulfamethoxazole-trimethoprim      Other reaction(s): vomitting  Other reaction(s): migraine        BMP  Lab Results   Component Value Date     09/07/2017    K 4.5 09/07/2017     09/07/2017    CO2 26 09/07/2017    BUN 23 09/07/2017    CREATININE 0.8 09/07/2017    CALCIUM 9.4 09/07/2017    ANIONGAP 8 09/07/2017    ESTGFRAFRICA >60 09/07/2017    EGFRNONAA >60 09/07/2017       Review of Systems   Constitutional: Negative for chills, fever and unexpected weight change.   HENT: Negative for nosebleeds.         Dry mouth   Eyes: Negative for visual disturbance.   Respiratory: Negative for chest tightness.    Cardiovascular: Negative for chest pain.   Gastrointestinal: Positive for constipation. Negative for diarrhea.   Genitourinary: Positive for frequency, nocturia and urgency. Negative for dyspareunia, dysuria and hematuria.   Musculoskeletal: Negative for myalgias.   Skin: Negative for rash.   Neurological: Negative for seizures and headaches.   Hematological: Does not bruise/bleed easily.   Psychiatric/Behavioral: Negative for behavioral problems.     Objective:      Physical Exam   Vitals reviewed.  Constitutional: She is oriented to person, place, and time. She appears well-developed and well-nourished.   HENT:   Head: Normocephalic and atraumatic.   Eyes: No scleral icterus.   Cardiovascular: Normal rate and regular rhythm.    Pulmonary/Chest: Effort normal. No respiratory distress.   Abdominal: She exhibits no mass.   Genitourinary:    Genitourinary Comments: The external genitalia were normal. No rash. Atrophic vaginitis was present. The urethral showed no evidence of a urethral diverticulum.  And in and out cath was performed under sterile conditions immediately after voiding. The PVR was 30 ml.    Perineum normal. External hemorrhoids were not present. Levator was not tender to palpation.   The uterus was not present. A cystocele was not present. A rectocele was not present.      Musculoskeletal: She exhibits no tenderness.   Lymphadenopathy:     She has no cervical adenopathy.   Neurological: She is alert and oriented to person, place, and time.   Skin: Skin is warm and dry. No rash noted.     Psychiatric: She has a normal mood and affect.     urine dip pH 5  Assessment:       1. Urge urinary incontinence    2. Essential hypertension        Plan:   Nicole was seen today for urinary incontinence.    Diagnoses and all orders for this visit:    Urge urinary incontinence  -     POCT urinalysis, dipstick or tablet reag  -     Urine culture  -     Vaginosis Screen by DNA Probe    Essential hypertension    Other orders  -     oxybutynin (DITROPAN-XL) 10 MG 24 hr tablet; Take 1 tablet (10 mg total) by mouth once daily.    f/u 6 - 8 weeks. Probiotic daily. Quick flick discussed.   Avoid dietary irritants.     I would like to thank Mei Turner PA-C for referral of this patient.

## 2017-10-10 NOTE — PATIENT INSTRUCTIONS
(Once daily)  Enablex **  Vesicare **  Gelnique (gel) - noone really uses this  Detrol LA - not my favorite  Oxybutnin XR - start 10mg, titrate to 15mg.  Sanctura XR **  Toviaz  Trospium 20mg (twice daily) - not bad, generic  oxybutnin 5mg (two or three times daily)  (more side effects) (failed this)    Myrbetriq ** (no dry mouth, dry eyes, constipation)

## 2017-10-10 NOTE — LETTER
October 10, 2017      Mei Turner PA-C  [address]  [facility] [address]           [facility] 4[facility]  [address]  [facility] [address]  Phone: [phone]          Patient: Nicole Jacques   MR Number: [id number]   YOB: 1950   Date of Visit: 10/10/2017       Dear Mei Turner:    Thank you for referring Nicole Jacques to me for evaluation. Attached you will find relevant portions of my assessment and plan of care.    If you have questions, please do not hesitate to call me. I look forward to following Nicole Jacques along with you.    Sincerely,    Jackie Samuels MD    Enclosure  CC:  No Recipients    If you would like to receive this communication electronically, please contact [email or web address] or [phone] to request more information on [employer] Link access.    For providers and/or their staff who would like to refer a patient to [facility], please contact us through our one-stop-shop provider referral line, [facility], at [phone].    If you feel you have received this communication in error or would no longer like to receive these types of communications, please e-mail [email or web address]

## 2017-10-12 LAB — BACTERIA UR CULT: NORMAL

## 2017-10-16 ENCOUNTER — TELEPHONE (OUTPATIENT)
Dept: OPHTHALMOLOGY | Facility: CLINIC | Age: 67
End: 2017-10-16

## 2017-10-16 ENCOUNTER — PATIENT MESSAGE (OUTPATIENT)
Dept: UROLOGY | Facility: CLINIC | Age: 67
End: 2017-10-16

## 2017-10-16 RX ORDER — CEPHALEXIN 500 MG/1
500 CAPSULE ORAL EVERY 12 HOURS
Qty: 20 CAPSULE | Refills: 0 | Status: SHIPPED | OUTPATIENT
Start: 2017-10-16 | End: 2017-10-26

## 2017-10-16 RX ORDER — FLUCONAZOLE 150 MG/1
150 TABLET ORAL DAILY
Qty: 1 TABLET | Refills: 0 | Status: SHIPPED | OUTPATIENT
Start: 2017-10-16 | End: 2017-10-17

## 2017-10-16 NOTE — TELEPHONE ENCOUNTER
Call patient and let her know antibiotic and antifungal sent to pharmacy. Have her let us know if 2 weeks how she is doing.

## 2017-10-16 NOTE — TELEPHONE ENCOUNTER
----- Message from Jannie Casillas sent at 10/16/2017 12:20 PM CDT -----  Contact: Nicole Santiago returning phone call but does not know who from.  Please contact her at [phone]

## 2017-10-16 NOTE — TELEPHONE ENCOUNTER
----- Message from Jannie Casillas sent at 10/16/2017  2:21 PM CDT -----  Contact: Nicole Santiago returning your returned phone call.  She can be reached at the number on file

## 2017-10-17 ENCOUNTER — TELEPHONE (OUTPATIENT)
Dept: UROLOGY | Facility: CLINIC | Age: 67
End: 2017-10-17

## 2017-11-08 DIAGNOSIS — F43.21 ADJUSTMENT DISORDER WITH DEPRESSED MOOD: ICD-10-CM

## 2017-11-10 ENCOUNTER — TELEPHONE (OUTPATIENT)
Dept: OPTOMETRY | Facility: CLINIC | Age: 67
End: 2017-11-10

## 2017-11-10 ENCOUNTER — TELEPHONE (OUTPATIENT)
Dept: OPHTHALMOLOGY | Facility: CLINIC | Age: 67
End: 2017-11-10

## 2017-11-10 RX ORDER — PAROXETINE HYDROCHLORIDE 40 MG/1
TABLET, FILM COATED ORAL
Qty: 90 TABLET | Refills: 1 | Status: SHIPPED | OUTPATIENT
Start: 2017-11-10 | End: 2018-02-08 | Stop reason: SDUPTHER

## 2017-11-12 RX ORDER — ASPIRIN 81 MG/1
81 TABLET ORAL EVERY MORNING
COMMUNITY

## 2017-11-12 NOTE — PRE-PROCEDURE INSTRUCTIONS
Spoke with Patient.  NPO, medication, and pre-op instructions reviewed.  Medications verified against her medicine bottles.  Denies previous problems with Anesthesia.  Aspirin and Meloxicam are on Hold.  Talkative.  Verbalized understanding of instructions.

## 2017-11-13 ENCOUNTER — ANESTHESIA (OUTPATIENT)
Dept: SURGERY | Facility: HOSPITAL | Age: 67
End: 2017-11-13
Payer: MEDICARE

## 2017-11-13 ENCOUNTER — HOSPITAL ENCOUNTER (OUTPATIENT)
Facility: HOSPITAL | Age: 67
Discharge: HOME | End: 2017-11-13
Attending: OPHTHALMOLOGY | Admitting: OPHTHALMOLOGY
Payer: MEDICARE

## 2017-11-13 ENCOUNTER — SURGERY (OUTPATIENT)
Age: 67
End: 2017-11-13

## 2017-11-13 ENCOUNTER — ANESTHESIA EVENT (OUTPATIENT)
Dept: SURGERY | Facility: HOSPITAL | Age: 67
End: 2017-11-13
Payer: MEDICARE

## 2017-11-13 VITALS
OXYGEN SATURATION: 100 % | RESPIRATION RATE: 16 BRPM | DIASTOLIC BLOOD PRESSURE: 85 MMHG | WEIGHT: 167 LBS | SYSTOLIC BLOOD PRESSURE: 154 MMHG | HEIGHT: 65 IN | HEART RATE: 65 BPM | BODY MASS INDEX: 27.82 KG/M2 | TEMPERATURE: 98 F

## 2017-11-13 DIAGNOSIS — H02.423 MYOGENIC PTOSIS OF BILATERAL EYELIDS: ICD-10-CM

## 2017-11-13 DIAGNOSIS — H02.413 MECHANICAL PTOSIS OF BILATERAL EYELIDS: Primary | ICD-10-CM

## 2017-11-13 PROCEDURE — 37000008 HC ANESTHESIA 1ST 15 MINUTES: Performed by: OPHTHALMOLOGY

## 2017-11-13 PROCEDURE — S0020 INJECTION, BUPIVICAINE HYDRO: HCPCS | Performed by: OPHTHALMOLOGY

## 2017-11-13 PROCEDURE — 63600175 PHARM REV CODE 636 W HCPCS: Performed by: NURSE ANESTHETIST, CERTIFIED REGISTERED

## 2017-11-13 PROCEDURE — 71000033 HC RECOVERY, INTIAL HOUR: Performed by: OPHTHALMOLOGY

## 2017-11-13 PROCEDURE — 63600175 PHARM REV CODE 636 W HCPCS: Performed by: OPHTHALMOLOGY

## 2017-11-13 PROCEDURE — 25000003 PHARM REV CODE 250: Performed by: NURSE ANESTHETIST, CERTIFIED REGISTERED

## 2017-11-13 PROCEDURE — 71000016 HC POSTOP RECOV ADDL HR: Performed by: OPHTHALMOLOGY

## 2017-11-13 PROCEDURE — 94761 N-INVAS EAR/PLS OXIMETRY MLT: CPT

## 2017-11-13 PROCEDURE — 36000706: Performed by: OPHTHALMOLOGY

## 2017-11-13 PROCEDURE — 25000003 PHARM REV CODE 250: Performed by: ANESTHESIOLOGY

## 2017-11-13 PROCEDURE — 71000015 HC POSTOP RECOV 1ST HR: Performed by: OPHTHALMOLOGY

## 2017-11-13 PROCEDURE — 27000221 HC OXYGEN, UP TO 24 HOURS

## 2017-11-13 PROCEDURE — 37000009 HC ANESTHESIA EA ADD 15 MINS: Performed by: OPHTHALMOLOGY

## 2017-11-13 PROCEDURE — 36000707: Performed by: OPHTHALMOLOGY

## 2017-11-13 PROCEDURE — D9220A PRA ANESTHESIA: Mod: CRNA,,, | Performed by: NURSE ANESTHETIST, CERTIFIED REGISTERED

## 2017-11-13 PROCEDURE — 25000003 PHARM REV CODE 250: Performed by: OPHTHALMOLOGY

## 2017-11-13 PROCEDURE — 67903 REPAIR EYELID DEFECT: CPT | Mod: 50,,, | Performed by: OPHTHALMOLOGY

## 2017-11-13 PROCEDURE — D9220A PRA ANESTHESIA: Mod: ANES,,, | Performed by: ANESTHESIOLOGY

## 2017-11-13 PROCEDURE — 27201423 OPTIME MED/SURG SUP & DEVICES STERILE SUPPLY: Performed by: OPHTHALMOLOGY

## 2017-11-13 RX ORDER — BUPIVACAINE HYDROCHLORIDE 5 MG/ML
INJECTION, SOLUTION EPIDURAL; INTRACAUDAL
Status: DISCONTINUED | OUTPATIENT
Start: 2017-11-13 | End: 2017-11-13 | Stop reason: HOSPADM

## 2017-11-13 RX ORDER — PROPOFOL 10 MG/ML
VIAL (ML) INTRAVENOUS
Status: DISCONTINUED | OUTPATIENT
Start: 2017-11-13 | End: 2017-11-13

## 2017-11-13 RX ORDER — MIDAZOLAM HYDROCHLORIDE 1 MG/ML
INJECTION INTRAMUSCULAR; INTRAVENOUS
Status: DISCONTINUED | OUTPATIENT
Start: 2017-11-13 | End: 2017-11-13

## 2017-11-13 RX ORDER — ONDANSETRON 2 MG/ML
4 INJECTION INTRAMUSCULAR; INTRAVENOUS EVERY 12 HOURS PRN
Status: CANCELLED | OUTPATIENT
Start: 2017-11-13

## 2017-11-13 RX ORDER — OXYCODONE HYDROCHLORIDE 5 MG/1
10 TABLET ORAL EVERY 4 HOURS PRN
Status: DISCONTINUED | OUTPATIENT
Start: 2017-11-13 | End: 2017-11-13 | Stop reason: HOSPADM

## 2017-11-13 RX ORDER — ACETAMINOPHEN 325 MG/1
650 TABLET ORAL EVERY 4 HOURS PRN
Status: DISCONTINUED | OUTPATIENT
Start: 2017-11-13 | End: 2017-11-13 | Stop reason: HOSPADM

## 2017-11-13 RX ORDER — TETRACAINE HYDROCHLORIDE 5 MG/ML
SOLUTION OPHTHALMIC
Status: DISCONTINUED
Start: 2017-11-13 | End: 2017-11-13 | Stop reason: HOSPADM

## 2017-11-13 RX ORDER — HYDROCODONE BITARTRATE AND ACETAMINOPHEN 5; 325 MG/1; MG/1
1 TABLET ORAL EVERY 6 HOURS PRN
Qty: 16 TABLET | Refills: 0 | Status: SHIPPED | OUTPATIENT
Start: 2017-11-13 | End: 2018-04-17

## 2017-11-13 RX ORDER — BUPIVACAINE HYDROCHLORIDE 5 MG/ML
INJECTION, SOLUTION EPIDURAL; INTRACAUDAL
Status: DISCONTINUED
Start: 2017-11-13 | End: 2017-11-13 | Stop reason: HOSPADM

## 2017-11-13 RX ORDER — NEOMYCIN SULFATE, POLYMYXIN B SULFATE, AND DEXAMETHASONE 3.5; 10000; 1 MG/G; [USP'U]/G; MG/G
OINTMENT OPHTHALMIC 3 TIMES DAILY
Qty: 1 TUBE | Refills: 1 | Status: CANCELLED | OUTPATIENT
Start: 2017-11-13 | End: 2017-11-20

## 2017-11-13 RX ORDER — LIDOCAINE HYDROCHLORIDE 10 MG/ML
1 INJECTION, SOLUTION EPIDURAL; INFILTRATION; INTRACAUDAL; PERINEURAL ONCE
Status: DISCONTINUED | OUTPATIENT
Start: 2017-11-13 | End: 2017-11-13 | Stop reason: HOSPADM

## 2017-11-13 RX ORDER — NEOMYCIN SULFATE, POLYMYXIN B SULFATE AND DEXAMETHASONE 3.5; 10000; 1 MG/ML; [USP'U]/ML; MG/ML
1 SUSPENSION/ DROPS OPHTHALMIC EVERY 6 HOURS
Qty: 5 ML | Refills: 0 | Status: SHIPPED | OUTPATIENT
Start: 2017-11-13 | End: 2018-04-17

## 2017-11-13 RX ORDER — FENTANYL CITRATE 50 UG/ML
INJECTION, SOLUTION INTRAMUSCULAR; INTRAVENOUS
Status: DISCONTINUED | OUTPATIENT
Start: 2017-11-13 | End: 2017-11-13

## 2017-11-13 RX ORDER — NEOMYCIN SULFATE, POLYMYXIN B SULFATE, AND DEXAMETHASONE 3.5; 10000; 1 MG/G; [USP'U]/G; MG/G
OINTMENT OPHTHALMIC 3 TIMES DAILY
Qty: 1 TUBE | Refills: 1 | Status: SHIPPED | OUTPATIENT
Start: 2017-11-13 | End: 2017-11-20

## 2017-11-13 RX ORDER — OXYCODONE HYDROCHLORIDE 5 MG/1
10 TABLET ORAL EVERY 4 HOURS PRN
Status: CANCELLED | OUTPATIENT
Start: 2017-11-13

## 2017-11-13 RX ORDER — LIDOCAINE HYDROCHLORIDE AND EPINEPHRINE 20; 10 MG/ML; UG/ML
INJECTION, SOLUTION INFILTRATION; PERINEURAL
Status: DISCONTINUED | OUTPATIENT
Start: 2017-11-13 | End: 2017-11-13 | Stop reason: HOSPADM

## 2017-11-13 RX ORDER — ONDANSETRON 2 MG/ML
4 INJECTION INTRAMUSCULAR; INTRAVENOUS EVERY 12 HOURS PRN
Status: DISCONTINUED | OUTPATIENT
Start: 2017-11-13 | End: 2017-11-13 | Stop reason: HOSPADM

## 2017-11-13 RX ORDER — OXYCODONE AND ACETAMINOPHEN 5; 325 MG/1; MG/1
1 TABLET ORAL EVERY 4 HOURS PRN
Qty: 16 TABLET | Refills: 0 | Status: CANCELLED | OUTPATIENT
Start: 2017-11-13

## 2017-11-13 RX ORDER — PHENYLEPHRINE HYDROCHLORIDE 10 MG/ML
INJECTION INTRAVENOUS
Status: DISCONTINUED | OUTPATIENT
Start: 2017-11-13 | End: 2017-11-13

## 2017-11-13 RX ORDER — LIDOCAINE HCL/PF 100 MG/5ML
SYRINGE (ML) INTRAVENOUS
Status: DISCONTINUED | OUTPATIENT
Start: 2017-11-13 | End: 2017-11-13

## 2017-11-13 RX ORDER — HYDROCODONE BITARTRATE AND ACETAMINOPHEN 5; 325 MG/1; MG/1
1 TABLET ORAL EVERY 4 HOURS PRN
Status: CANCELLED | OUTPATIENT
Start: 2017-11-13

## 2017-11-13 RX ORDER — LIDOCAINE HCL/EPINEPHRINE/PF 2%-1:200K
VIAL (ML) INJECTION
Status: DISCONTINUED
Start: 2017-11-13 | End: 2017-11-13 | Stop reason: HOSPADM

## 2017-11-13 RX ORDER — ACETAMINOPHEN 325 MG/1
650 TABLET ORAL EVERY 4 HOURS PRN
Status: CANCELLED | OUTPATIENT
Start: 2017-11-13

## 2017-11-13 RX ORDER — SODIUM CHLORIDE 9 MG/ML
INJECTION, SOLUTION INTRAVENOUS CONTINUOUS
Status: DISCONTINUED | OUTPATIENT
Start: 2017-11-13 | End: 2017-11-13 | Stop reason: HOSPADM

## 2017-11-13 RX ORDER — HYDROCODONE BITARTRATE AND ACETAMINOPHEN 5; 325 MG/1; MG/1
1 TABLET ORAL EVERY 4 HOURS PRN
Status: DISCONTINUED | OUTPATIENT
Start: 2017-11-13 | End: 2017-11-13 | Stop reason: HOSPADM

## 2017-11-13 RX ORDER — SODIUM CHLORIDE 0.9 % (FLUSH) 0.9 %
3 SYRINGE (ML) INJECTION
Status: DISCONTINUED | OUTPATIENT
Start: 2017-11-13 | End: 2017-11-13 | Stop reason: HOSPADM

## 2017-11-13 RX ORDER — ONDANSETRON 2 MG/ML
INJECTION INTRAMUSCULAR; INTRAVENOUS
Status: DISCONTINUED | OUTPATIENT
Start: 2017-11-13 | End: 2017-11-13

## 2017-11-13 RX ORDER — NEOMYCIN SULFATE, POLYMYXIN B SULFATE AND DEXAMETHASONE 3.5; 10000; 1 MG/ML; [USP'U]/ML; MG/ML
1 SUSPENSION/ DROPS OPHTHALMIC EVERY 6 HOURS
Qty: 5 ML | Refills: 0 | Status: CANCELLED | OUTPATIENT
Start: 2017-11-13

## 2017-11-13 RX ORDER — TETRACAINE HYDROCHLORIDE 5 MG/ML
1 SOLUTION OPHTHALMIC
Status: DISCONTINUED | OUTPATIENT
Start: 2017-11-13 | End: 2017-11-13 | Stop reason: HOSPADM

## 2017-11-13 RX ADMIN — FENTANYL CITRATE 50 MCG: 50 INJECTION, SOLUTION INTRAMUSCULAR; INTRAVENOUS at 07:11

## 2017-11-13 RX ADMIN — PHENYLEPHRINE HYDROCHLORIDE 100 MCG: 10 INJECTION INTRAVENOUS at 07:11

## 2017-11-13 RX ADMIN — HYALURONIDASE, OVINE 1 ML: 200 INJECTION, SOLUTION SUBCUTANEOUS at 09:11

## 2017-11-13 RX ADMIN — SODIUM CHLORIDE, SODIUM GLUCONATE, SODIUM ACETATE, POTASSIUM CHLORIDE, MAGNESIUM CHLORIDE, SODIUM PHOSPHATE, DIBASIC, AND POTASSIUM PHOSPHATE: .53; .5; .37; .037; .03; .012; .00082 INJECTION, SOLUTION INTRAVENOUS at 08:11

## 2017-11-13 RX ADMIN — BUPIVACAINE HYDROCHLORIDE 4.5 ML: 5 INJECTION, SOLUTION EPIDURAL; INTRACAUDAL; PERINEURAL at 09:11

## 2017-11-13 RX ADMIN — HYDROCODONE BITARTRATE AND ACETAMINOPHEN 1 TABLET: 5; 325 TABLET ORAL at 12:11

## 2017-11-13 RX ADMIN — ONDANSETRON 4 MG: 2 INJECTION INTRAMUSCULAR; INTRAVENOUS at 08:11

## 2017-11-13 RX ADMIN — LIDOCAINE HYDROCHLORIDE AND EPINEPHRINE 4.5 ML: 20; 10 INJECTION, SOLUTION INFILTRATION; PERINEURAL at 09:11

## 2017-11-13 RX ADMIN — LIDOCAINE HYDROCHLORIDE 60 MG: 20 INJECTION, SOLUTION INTRAVENOUS at 07:11

## 2017-11-13 RX ADMIN — SODIUM CHLORIDE 10 ML/HR: 0.9 INJECTION, SOLUTION INTRAVENOUS at 06:11

## 2017-11-13 RX ADMIN — MIDAZOLAM HYDROCHLORIDE 2 MG: 1 INJECTION, SOLUTION INTRAMUSCULAR; INTRAVENOUS at 07:11

## 2017-11-13 RX ADMIN — PROPOFOL 200 MG: 10 INJECTION, EMULSION INTRAVENOUS at 07:11

## 2017-11-13 RX ADMIN — PHENYLEPHRINE HYDROCHLORIDE 100 MCG: 10 INJECTION INTRAVENOUS at 08:11

## 2017-11-13 NOTE — BRIEF OP NOTE
Attending: Jamila Berman  Resident: None  Pre-op Dx: bilateral myogenic ptosis and dermatochalasis   Post-op Dx: same  Procedure: bilateral MMCR and bilateral blepharoplasty  Anesthesia: Local and General 2% lidocaine with epinephrine, 0.5% marcaine, and vitrase  Ebl: less than 5 cc  Specimens: none  Complications: none

## 2017-11-13 NOTE — OP NOTE
Attending: Jamila Berman  Resident: None  Pre-op Dx: bilateral myogenic ptosis and dermatochalasis   Post-op Dx: same  Procedure: bilateral MMCR and bilateral blepharoplasty  Anesthesia: Local and General 2% lidocaine with epinephrine, 0.5% marcaine, and vitrase  Ebl: less than 5 cc  Specimens: none  Complications: none    Indications for surgery: 67 y.o. Female with bilateral myogenic ptosis and dermatochalasis impairing her superior visual fields. Informed consent obtained after extensive risks/benefits/alternatives were discussed with the patient including but not limited to pain, bleeding, infection, ocular injury, loss of the eye, asymmetry, need for revision in future, scarring.  Alternatives such as waiting were discussed.  All questions were answered.      The pupils were marked on the eyelid skin preoperatively. The eyelids were marked with a marking pen, 10 mm centrally from the margin, 8 mm medially and 9 mm laterally, and from the brow 10 mm medially, centrally, and laterally. Corneal shields were placed in each eye. Attention was first placed to the right eyelid. A 4-0 Silk traction suture was placed into the eyelid.    Both upper eyelids were infiltrated with 2 cc of 2% lidocaine with epinephrine, 0.5% Marcaine, and vitrase. The patient was prepped and draped in the usual sterile manner for ophthalmic plastic surgery.     Attention was turned to the right eye.  A 4-0 silk traction suture overlying the pupil was put in at the lid margin. A desmarres retractor was used to armen the eyelid.  A cotton-tipped applicator was used to dry off the conjunctiva, and a marking pen was used to delineate 5 mm from the superior aspect of the tarsus medially, centrally, and laterally.  5 mm was also marked from the central margot superiorly. A 4-0 silk was passed through each of the markings through ruizs muscle and conjunctiva. The sutures were tied into two different bundles with equal tension. Both bundles were  elevated with equal tension while the ptosis clamp was applied. A 6-0 Prolene on P-3 suture was placed through the skin and exiting beneath the clamp at the lateral most aspect into conjunctiva.  The suture was run in horizontal mattress fashion along the clamp and exited out through the eyelid skin.  The two ends of the running suture were held on tension toward the top of the patients head. A 15 blade was used to excise the excess ruizs muscle and conjunctiva within the clamp.  A desmarres was used to armen the eyelid, and no silk suture was found.  The suture was tied lightly on the eyelid. Tobrex katie was applied to the suture and in the eye.  The procedure was repeated for the left eye; however, the markings were measured at 6 mm from the tarsus in addition to the central margot.      A #15 blade was used to incise the marked skin and then raise and remove the blepharoplasty skin, taking care to preserve the underlying orbicularis oculi. Hemostasis was achieved with bovie electrocautery. Sharp Shanice scissor were then used to make a small incision in the medial orbicularis, dissecting down into the medial upper fat pad. A small pedicle of the medial fat pad was exposed, and electrocautery was used to excise this pedicle at its base. After hemostasis was ensured, a 6-0 prolene running suture was used to close the skin of the incision. This procedure was repeated in its entirety on the left upper eyelid. The corneal shields were removed and traction sutures cut and removed. Tobradex ointment was applied to the wounds and onto the eye. The patient tolerated the procedure well. There were no complications.

## 2017-11-13 NOTE — DISCHARGE SUMMARY
Discharge Note  Short Stay      SUMMARY     Admit Date: 11/13/2017    Attending Physician: Jamila Berman MD    Discharge Physician: Jamila Berman MD    Discharge Date: 11/13/2017 9:20 AM    Final Diagnosis: Post-Op Diagnosis Codes:     * Dermatochalasis of eyelid [H02.839]     * Ptosis of eyelid, bilateral [H02.403]     * Myogenic ptosis of bilateral eyelids [H02.423]    Hospital Course: The patient underwent uncomplicated eyelid surgery under general anesthesia and was discharged after recovery to be followed as an outpatient in the clinic.    Disposition: discharged home    Patient Instructions:   Current Discharge Medication List      CONTINUE these medications which have NOT CHANGED    Details   alprazolam (XANAX) 2 MG Tab TAKE 1 TABLET BY MOUTH TWICE DAILY AS NEEDED  Qty: 60 tablet, Refills: 2    Associated Diagnoses: ROSALIA (generalized anxiety disorder)      amlodipine (NORVASC) 10 MG tablet Take 1 tablet (10 mg total) by mouth once daily.  Qty: 90 tablet, Refills: 2    Associated Diagnoses: Essential hypertension      ascorbic acid (VITAMIN C) 1000 MG tablet Take 2 tablets by mouth every morning. Takes two 1000 mg tablets every morning      aspirin (ECOTRIN) 81 MG EC tablet Take 81 mg by mouth every morning.      B-complex with vitamin C (Z-BEC OR EQUIV) tablet Take 1 tablet by mouth every morning.       co-enzyme Q-10 30 mg capsule Take 30 mg by mouth every morning.       estradiol (ESTRACE) 1 MG tablet TAKE 1 TABLET EVERY DAY  Qty: 90 tablet, Refills: 3    Associated Diagnoses: Menopausal symptoms      losartan (COZAAR) 50 MG tablet Take 1 tablet (50 mg total) by mouth once daily.  Qty: 90 tablet, Refills: 2    Associated Diagnoses: Essential hypertension      LUBRICANT EYE DROPS, GLYC-PG, 1-0.3 % Drop Place 1 drop into both eyes as needed.       meloxicam (MOBIC) 15 MG tablet TAKE 1 TABLET EVERY DAY  Qty: 90 tablet, Refills: 0    Associated Diagnoses: Primary osteoarthritis involving multiple joints       omega-3 fatty acids (FISH OIL) 500 mg Cap Take 1 capsule by mouth every morning.       omeprazole (PRILOSEC) 20 MG capsule TAKE 1 CAPSULE ONE TIME DAILY  Qty: 90 capsule, Refills: 0    Associated Diagnoses: Gastroesophageal reflux disease, esophagitis presence not specified      oxybutynin (DITROPAN-XL) 10 MG 24 hr tablet Take 1 tablet (10 mg total) by mouth once daily.  Qty: 30 tablet, Refills: 12      paroxetine (PAXIL) 40 MG tablet TAKE 1 TABLET EVERY DAY  Qty: 90 tablet, Refills: 1    Associated Diagnoses: Adjustment disorder with depressed mood      VITAMIN D3 5,000 unit Tab Take 5,000 Units by mouth every morning.       MULTIVITAMIN (MULTIPLE VITAMIN ESSENTIAL ORAL) Take 1 tablet by mouth every morning.              Discharge Procedure Orders (must include Diet, Follow-up, Activity):  No discharge procedures on file.

## 2017-11-13 NOTE — PLAN OF CARE
Patient denies nausea.  C/O pain  8/10 to bilateral eyes, norco given prior to discharge with written prescriptions, eye drops, and eye ointment.  Tolerates clear liquids well.  VSS.  Discharge instructions given with verbal understanding received.  Anesthesia and procedure consents in chart.

## 2017-11-13 NOTE — ANESTHESIA PREPROCEDURE EVALUATION
11/13/2017  Nicole Jacques is a 67 y.o., female     Pre-operative evaluation for Procedure(s) (LRB):  REPAIR-PTOSIS/MMCR 10/12 (Bilateral)  BLEPHAROPLASTY/CONSERVATIVE (Bilateral)    Nicole Jacques is a 67 y.o. female     LDA:     Prev airway:     Drips:     Patient Active Problem List   Diagnosis    Osteoarthrosis involving, or with mention of more than one site, but not specified as generalized, site unspecified(795.82)    Hypertension    GERD (gastroesophageal reflux disease)    Lumbar spondylosis    Depression    Urge urinary incontinence    ROSALIA (generalized anxiety disorder)    S/P cataract extraction    Need for tetanus booster       Review of patient's allergies indicates:   Allergen Reactions    Sulfamethoxazole-trimethoprim Nausea And Vomiting and Other (See Comments)     Other reaction(s): Migraine        No current facility-administered medications on file prior to encounter.      Current Outpatient Prescriptions on File Prior to Encounter   Medication Sig Dispense Refill    amlodipine (NORVASC) 10 MG tablet Take 1 tablet (10 mg total) by mouth once daily. (Patient taking differently: Take 10 mg by mouth every morning. ) 90 tablet 2    ascorbic acid (VITAMIN C) 1000 MG tablet Take 2 tablets by mouth every morning. Takes two 1000 mg tablets every morning      B-complex with vitamin C (Z-BEC OR EQUIV) tablet Take 1 tablet by mouth every morning.       co-enzyme Q-10 30 mg capsule Take 30 mg by mouth every morning.       estradiol (ESTRACE) 1 MG tablet TAKE 1 TABLET EVERY DAY (Patient taking differently: TAKE 1 TABLET EVERY DAY, morning) 90 tablet 3    losartan (COZAAR) 50 MG tablet Take 1 tablet (50 mg total) by mouth once daily. (Patient taking differently: Take 50 mg by mouth every morning. ) 90 tablet 2    LUBRICANT EYE DROPS, GLYC-PG, 1-0.3 % Drop Place 1 drop into both eyes as  needed.       MULTIVITAMIN (MULTIPLE VITAMIN ESSENTIAL ORAL) Take 1 tablet by mouth every morning.       omega-3 fatty acids (FISH OIL) 500 mg Cap Take 1 capsule by mouth every morning.       VITAMIN D3 5,000 unit Tab Take 5,000 Units by mouth every morning.          Past Surgical History:   Procedure Laterality Date    APPENDECTOMY      CATARACT EXTRACTION W/  INTRAOCULAR LENS IMPLANT Left 14    Dr Drake    CATARACT EXTRACTION W/  INTRAOCULAR LENS IMPLANT Right 14    COLONOSCOPY N/A 2015    Procedure: COLONOSCOPY;  Surgeon: Desmond Casillas MD;  Location: [address];  Service: Endoscopy;  Laterality: N/A;    EYE SURGERY      HYSTERECTOMY      partial    KNEE SURGERY Left     TUBAL LIGATION         Social History     Social History    Marital status:      Spouse name: N/A    Number of children: N/A    Years of education: N/A     Occupational History    Not on file.     Social History Main Topics    Smoking status: Passive Smoke Exposure - Never Smoker    Smokeless tobacco: Never Used    Alcohol use Yes      Comment: Social.    Drug use: No    Sexual activity: Yes     Partners: Male     Birth control/ protection: None     Other Topics Concern    Not on file     Social History Narrative    No narrative on file         Vital Signs Range (Last 24H):         CBC: No results for input(s): WBC, RBC, HGB, HCT, PLT, MCV, MCH, MCHC in the last 72 hours.    CMP: No results for input(s): NA, K, CL, CO2, BUN, CREATININE, GLU, MG, PHOS, CALCIUM, ALBUMIN, PROT, ALKPHOS, ALT, AST, BILITOT in the last 72 hours.    INR  No results for input(s): INR, PROTIME, APTT in the last 72 hours.    Invalid input(s): PT        Diagnostic Studies:      EKD Echo:      .    Anesthesia Evaluation         Review of Systems      Physical Exam  General:  Well nourished    Airway/Jaw/Neck:  Airway Findings: Mouth Opening: Normal Tongue: Normal  General Airway Assessment: Adult  Mallampati: II   Improves to II with phonation.  TM Distance: Normal, at least 6 cm            Mental Status:  Mental Status Findings:  Cooperative, Alert and Oriented         Anesthesia Plan  Type of Anesthesia, risks & benefits discussed:  Anesthesia Type:  general  Patient's Preference:   Intra-op Monitoring Plan: standard ASA monitors  Intra-op Monitoring Plan Comments:   Post Op Pain Control Plan:   Post Op Pain Control Plan Comments:   Induction:   IV  Beta Blocker:  Patient is not currently on a Beta-Blocker (No further documentation required).       Informed Consent: Patient understands risks and agrees with Anesthesia plan.  Questions answered. Anesthesia consent signed with patient.  ASA Score: 2     Day of Surgery Review of History & Physical:    H&P update referred to the surgeon.         Ready For Surgery From Anesthesia Perspective.

## 2017-11-13 NOTE — PLAN OF CARE
Patient arrived from PACU with DESIREE Wilkins RN.  Patient stable.  Report received at this time.  Assumed care of patient at this time.

## 2017-11-13 NOTE — TRANSFER OF CARE
"Anesthesia Transfer of Care Note    Patient: Nicole Jacques    Procedure(s) Performed: Procedure(s) (LRB):  REPAIR-PTOSIS/MMCR 10/12 (Bilateral)  BLEPHAROPLASTY/CONSERVATIVE (Bilateral)    Patient location: PACU    Anesthesia Type: general    Transport from OR: Transported from OR on 6-10 L/min O2 by face mask with adequate spontaneous ventilation    Post pain: adequate analgesia    Post assessment: no apparent anesthetic complications and tolerated procedure well    Post vital signs: stable    Level of consciousness: awake and alert    Nausea/Vomiting: no nausea/vomiting    Complications: none    Transfer of care protocol was followed      Last vitals:   Visit Vitals  /67 (BP Location: Left arm, Patient Position: Lying)   Pulse 70   Temp 36.5 °C (97.7 °F) (Oral)   Resp 18   Ht 5' 5" (1.651 m)   Wt 75.8 kg (167 lb)   SpO2 95%   BMI 27.79 kg/m²     "

## 2017-11-13 NOTE — ANESTHESIA POSTPROCEDURE EVALUATION
"Anesthesia Post Evaluation    Patient: Nicole Jacques    Procedure(s) Performed: Procedure(s) (LRB):  REPAIR-PTOSIS/MMCR 10/12 (Bilateral)  BLEPHAROPLASTY/CONSERVATIVE (Bilateral)    Final Anesthesia Type: general  Patient location during evaluation: PACU  Patient participation: Yes- Able to Participate  Level of consciousness: awake and alert  Post-procedure vital signs: reviewed and stable  Pain management: adequate  Airway patency: patent  PONV status at discharge: No PONV  Anesthetic complications: no      Cardiovascular status: hemodynamically stable  Respiratory status: room air, spontaneous ventilation and unassisted  Hydration status: euvolemic  Follow-up not needed.        Visit Vitals  BP (!) 154/85   Pulse 65   Temp 36.7 °C (98.1 °F) (Temporal)   Resp 16   Ht 5' 5" (1.651 m)   Wt 75.8 kg (167 lb)   SpO2 100%   BMI 27.79 kg/m²       Pain/Afsaneh Score: Pain Assessment Performed: Yes (11/13/2017 11:55 AM)  Presence of Pain: denies (11/13/2017 11:55 AM)  Pain Rating Prior to Med Admin: 8 (11/13/2017 12:26 PM)  Pain Rating Post Med Admin: 0 (11/13/2017  6:22 AM)  Afsaneh Score: 10 (11/13/2017 11:55 AM)      "

## 2017-11-13 NOTE — H&P
Past Medical History:   Diagnosis Date    Anxiety     Cataract     Depression     Family history of colon cancer     GERD (gastroesophageal reflux disease)     Hypertension     Lumbar spondylosis     Lumbar spondylosis     Overactive bladder     Urge urinary incontinence        Past Surgical History:   Procedure Laterality Date    APPENDECTOMY      CATARACT EXTRACTION W/  INTRAOCULAR LENS IMPLANT Left 06/03/14    Dr Drake    CATARACT EXTRACTION W/  INTRAOCULAR LENS IMPLANT Right 7/7/14    COLONOSCOPY N/A 11/2/2015    Procedure: COLONOSCOPY;  Surgeon: Desmond Casillas MD;  Location: [facility] ([address] [place] [address]);  Service: Endoscopy;  Laterality: N/A;    EYE SURGERY      HYSTERECTOMY      partial    KNEE SURGERY Left     TUBAL LIGATION         Family History   Problem Relation Age of Onset    Hypertension Mother     Kidney disease Mother     Heart disease Mother     Dementia Father     Cancer Father      bladder    Diabetes Maternal Grandmother     Diabetes Paternal Grandmother     Cancer Maternal Uncle      colon cancer    Hypertension Sister     Diabetes Sister     Diabetes Brother     Hypertension Brother     No Known Problems Daughter     No Known Problems Son     Hypertension Sister     Cancer Sister      breast cancer    Amblyopia Neg Hx     Blindness Neg Hx     Cataracts Neg Hx     Glaucoma Neg Hx     Macular degeneration Neg Hx     Retinal detachment Neg Hx     Strabismus Neg Hx        Social History     Social History    Marital status:      Spouse name: N/A    Number of children: N/A    Years of education: N/A     Social History Main Topics    Smoking status: Passive Smoke Exposure - Never Smoker    Smokeless tobacco: Never Used    Alcohol use Yes      Comment: Social.    Drug use: No    Sexual activity: Yes     Partners: Male     Birth control/ protection: None     Other Topics Concern    None     Social History Narrative    None       Current  Facility-Administered Medications   Medication Dose Route Frequency Provider Last Rate Last Dose    0.9%  NaCl infusion   Intravenous Continuous Alvina Villalobos MD 10 mL/hr at 11/13/17 0636 10 mL/hr at 11/13/17 0636    bupivacaine (PF) 0.5% (5 mg/ml) (MARCAINE) 0.5 % (5 mg/mL) injection             hyaluronidase (CAFATINE) 200 unit/mL solution             hyaluronidase (CAFATINE) 200 unit/mL solution             hyaluronidase (CAFATINE) 200 unit/mL solution             lidocaine (PF) 10 mg/ml (1%) injection 10 mg  1 mL Intradermal Once Donis Mccartney MD        lidocaine-EPINEPHrine (PF) 2%-1:200,000 2 %-1:200,000 injection             sodium chloride 0.9% flush 3 mL  3 mL Intravenous PRN Alvina Villalobos MD        tetracaine HCl (PF) 0.5 % Drop 1 drop  1 drop Left Eye On Call Procedure Donis Mccartney MD        tetracaine HCl (PF) 0.5 % Drop             tobramycin-dexamethasone 0.3-0.1% (TOBRADEX) 0.3-0.1 % ophthalmic ointment                Review of patient's allergies indicates:   Allergen Reactions    Sulfamethoxazole-trimethoprim Nausea And Vomiting and Other (See Comments)     Other reaction(s): Migraine     PE  HEENT: normocephalic, atraumatic  CV: RRR nl s1 and s2  ABD: s/nt/nd  Ext: warm perfused      67 y.o. Female here for bilateral ptosis repair and blepharoplasty.  To OR for bilateral MMCR and bleph.

## 2017-11-20 ENCOUNTER — OFFICE VISIT (OUTPATIENT)
Dept: OPHTHALMOLOGY | Facility: CLINIC | Age: 67
End: 2017-11-20
Payer: MEDICARE

## 2017-11-20 DIAGNOSIS — H02.839 DERMATOCHALASIS OF EYELID: ICD-10-CM

## 2017-11-20 DIAGNOSIS — H02.423 MYOGENIC PTOSIS OF BILATERAL EYELIDS: ICD-10-CM

## 2017-11-20 DIAGNOSIS — Z98.890 POST-OPERATIVE STATE: Primary | ICD-10-CM

## 2017-11-20 PROCEDURE — 92285 EXTERNAL OCULAR PHOTOGRAPHY: CPT | Mod: S$GLB,,, | Performed by: OPHTHALMOLOGY

## 2017-11-20 PROCEDURE — 99999 PR PBB SHADOW E&M-EST. PATIENT-LVL I: CPT | Mod: PBBFAC,,, | Performed by: OPHTHALMOLOGY

## 2017-11-20 PROCEDURE — 99024 POSTOP FOLLOW-UP VISIT: CPT | Mod: S$GLB,,, | Performed by: OPHTHALMOLOGY

## 2017-11-20 NOTE — PROGRESS NOTES
HPI     S/p trent mmcr and bleph 11/13/17  Using TD drops, and ointment qid    Last edited by Jamila Berman MD on 11/20/2017 10:41 AM. (History)            Assessment /Plan     For exam results, see Encounter Report.    Post-operative state  -     External Photography - OU - Both Eyes    Myogenic ptosis of bilateral eyelids    Dermatochalasis of eyelid      Patient doing well! Post-operative instructions reviewed. Sutures removed. All questions answered.  Return in 3 weeks prn sooner any worsening of vision/symptoms or any concerns.

## 2017-12-09 DIAGNOSIS — F41.1 GAD (GENERALIZED ANXIETY DISORDER): ICD-10-CM

## 2017-12-11 DIAGNOSIS — Z12.31 ENCOUNTER FOR SCREENING MAMMOGRAM FOR BREAST CANCER: Primary | ICD-10-CM

## 2017-12-11 DIAGNOSIS — M15.9 PRIMARY OSTEOARTHRITIS INVOLVING MULTIPLE JOINTS: ICD-10-CM

## 2017-12-11 DIAGNOSIS — I10 ESSENTIAL HYPERTENSION: ICD-10-CM

## 2017-12-11 DIAGNOSIS — K21.9 GASTROESOPHAGEAL REFLUX DISEASE, ESOPHAGITIS PRESENCE NOT SPECIFIED: ICD-10-CM

## 2017-12-11 RX ORDER — AMLODIPINE BESYLATE 10 MG/1
TABLET ORAL
Qty: 90 TABLET | Refills: 0 | Status: SHIPPED | OUTPATIENT
Start: 2017-12-11 | End: 2018-02-08 | Stop reason: SDUPTHER

## 2017-12-11 RX ORDER — NEOMYCIN SULFATE, POLYMYXIN B SULFATE AND DEXAMETHASONE 3.5; 10000; 1 MG/ML; [USP'U]/ML; MG/ML
SUSPENSION/ DROPS OPHTHALMIC
Qty: 5 ML | Refills: 0 | OUTPATIENT
Start: 2017-12-11

## 2017-12-11 RX ORDER — ALPRAZOLAM 2 MG/1
TABLET ORAL
Qty: 60 TABLET | Refills: 2 | Status: SHIPPED | OUTPATIENT
Start: 2017-12-11 | End: 2018-03-14 | Stop reason: SDUPTHER

## 2017-12-11 NOTE — TELEPHONE ENCOUNTER
----- Message from Trena Cuevas sent at 12/11/2017  9:36 AM CST -----  Contact: Patient [phone]  Orders Needed    Orders being requested: Mammogram    Does patient have future appt with PCP: No    Patient needs this done prior to 12/15/2017 due to insurance.    Thank You

## 2017-12-13 DIAGNOSIS — N95.1 MENOPAUSAL SYMPTOMS: ICD-10-CM

## 2017-12-13 RX ORDER — MELOXICAM 15 MG/1
TABLET ORAL
Qty: 90 TABLET | Refills: 0 | Status: SHIPPED | OUTPATIENT
Start: 2017-12-13 | End: 2018-04-17 | Stop reason: SDUPTHER

## 2017-12-13 RX ORDER — ESTRADIOL 1 MG/1
TABLET ORAL
Qty: 90 TABLET | Refills: 3 | Status: SHIPPED | OUTPATIENT
Start: 2017-12-13 | End: 2018-04-24 | Stop reason: SDUPTHER

## 2017-12-13 RX ORDER — OMEPRAZOLE 20 MG/1
CAPSULE, DELAYED RELEASE ORAL
Qty: 90 CAPSULE | Refills: 0 | Status: SHIPPED | OUTPATIENT
Start: 2017-12-13 | End: 2018-04-11 | Stop reason: SDUPTHER

## 2017-12-14 ENCOUNTER — OFFICE VISIT (OUTPATIENT)
Dept: OPHTHALMOLOGY | Facility: CLINIC | Age: 67
End: 2017-12-14
Payer: MEDICARE

## 2017-12-14 ENCOUNTER — HOSPITAL ENCOUNTER (OUTPATIENT)
Dept: RADIOLOGY | Facility: HOSPITAL | Age: 67
Discharge: HOME | End: 2017-12-14
Attending: INTERNAL MEDICINE
Payer: MEDICARE

## 2017-12-14 VITALS — WEIGHT: 167 LBS | BODY MASS INDEX: 27.82 KG/M2 | HEIGHT: 65 IN

## 2017-12-14 DIAGNOSIS — H16.213 EXPOSURE KERATOPATHY, BILATERAL: ICD-10-CM

## 2017-12-14 DIAGNOSIS — H02.839 DERMATOCHALASIS OF EYELID: ICD-10-CM

## 2017-12-14 DIAGNOSIS — Z98.890 POST-OPERATIVE STATE: Primary | ICD-10-CM

## 2017-12-14 DIAGNOSIS — H02.423 MYOGENIC PTOSIS OF BILATERAL EYELIDS: ICD-10-CM

## 2017-12-14 DIAGNOSIS — Z12.31 ENCOUNTER FOR SCREENING MAMMOGRAM FOR BREAST CANCER: ICD-10-CM

## 2017-12-14 DIAGNOSIS — H02.826 SEBACEOUS CYST OF EYELID, LEFT: ICD-10-CM

## 2017-12-14 PROCEDURE — 92285 EXTERNAL OCULAR PHOTOGRAPHY: CPT | Mod: S$GLB,,, | Performed by: OPHTHALMOLOGY

## 2017-12-14 PROCEDURE — 99024 POSTOP FOLLOW-UP VISIT: CPT | Mod: S$GLB,,, | Performed by: OPHTHALMOLOGY

## 2017-12-14 PROCEDURE — 77063 BREAST TOMOSYNTHESIS BI: CPT | Mod: 26,,, | Performed by: RADIOLOGY

## 2017-12-14 PROCEDURE — 77067 SCR MAMMO BI INCL CAD: CPT | Mod: 26,,, | Performed by: RADIOLOGY

## 2017-12-14 PROCEDURE — 99999 PR PBB SHADOW E&M-EST. PATIENT-LVL III: CPT | Mod: PBBFAC,,, | Performed by: OPHTHALMOLOGY

## 2017-12-14 PROCEDURE — 77067 SCR MAMMO BI INCL CAD: CPT | Mod: TC

## 2017-12-14 NOTE — PROGRESS NOTES
HPI     Post-op Evaluation    Additional comments: 3 week f/u s/p trent. ptosis repair 11/13/17           Comments   Eyes still burn at night.  Using maxitrol drops qid       Last edited by Sonia Galo on 12/14/2017  1:30 PM. (History)            Assessment /Plan     For exam results, see Encounter Report.    Post-operative state  -     External/Slit Lamp Photography    Myogenic ptosis of bilateral eyelids    Dermatochalasis of eyelid    Sebaceous cyst of eyelid, left    Exposure keratopathy, bilateral      Patient doing well! Post-operative instructions reviewed. All questions answered. Return in 4 weeks prn sooner any worsening of vision/symptoms or any concerns.    Procedure note:   Informed consent obtained after extensive risks/benefits/alternatives were discussed with the patient including but not limited to pain, bleeding, infection, ocular injury, loss of the eye, asymmetry, need for revision in future, scarring.  Alternatives such as waiting were discussed.  All questions were answered.      Lid infiltrated with 0.5 cc of 2% lidocaine with epinephrine and 4% sodium bicarbonate. Sebaceous cyst excised with nomi scissors. High-temp cautery to base of cyst.     Return in one month prn sooner any worsening    Emphasized importance of using genteal ointment qhs ou and artificial tears up to qid ou on regular basis

## 2017-12-15 RX ORDER — PAROXETINE 30 MG/1
TABLET, FILM COATED ORAL
Qty: 90 TABLET | Refills: 1 | OUTPATIENT
Start: 2017-12-15

## 2017-12-19 ENCOUNTER — TELEPHONE (OUTPATIENT)
Dept: OPHTHALMOLOGY | Facility: CLINIC | Age: 67
End: 2017-12-19

## 2017-12-19 DIAGNOSIS — I10 ESSENTIAL HYPERTENSION: ICD-10-CM

## 2017-12-19 DIAGNOSIS — K21.9 GASTROESOPHAGEAL REFLUX DISEASE, ESOPHAGITIS PRESENCE NOT SPECIFIED: ICD-10-CM

## 2017-12-20 RX ORDER — LOSARTAN POTASSIUM 50 MG/1
TABLET ORAL
Qty: 90 TABLET | Refills: 0 | Status: SHIPPED | OUTPATIENT
Start: 2017-12-20 | End: 2018-04-17 | Stop reason: SDUPTHER

## 2017-12-20 RX ORDER — OMEPRAZOLE 20 MG/1
CAPSULE, DELAYED RELEASE ORAL
Qty: 90 CAPSULE | Refills: 0 | OUTPATIENT
Start: 2017-12-20

## 2017-12-20 RX ORDER — AMLODIPINE BESYLATE 10 MG/1
TABLET ORAL
Qty: 90 TABLET | Refills: 0 | OUTPATIENT
Start: 2017-12-20

## 2018-01-01 ENCOUNTER — PATIENT MESSAGE (OUTPATIENT)
Dept: INTERNAL MEDICINE | Facility: CLINIC | Age: 68
End: 2018-01-01

## 2018-01-10 DIAGNOSIS — F41.1 GAD (GENERALIZED ANXIETY DISORDER): ICD-10-CM

## 2018-01-10 RX ORDER — ALPRAZOLAM 2 MG/1
2 TABLET ORAL 2 TIMES DAILY
Qty: 60 TABLET | Refills: 2 | Status: CANCELLED | OUTPATIENT
Start: 2018-01-10

## 2018-01-10 NOTE — TELEPHONE ENCOUNTER
3 refills of Alprazolam were authorized one month ago. If the remaining two refills can not be transferred to [facility], please call and cancel them at [facility] and call in at [facility].   Does she need all the other meds ordered at [facility] too, or can [employer] transfer these?

## 2018-01-30 ENCOUNTER — OFFICE VISIT (OUTPATIENT)
Dept: OPHTHALMOLOGY | Facility: CLINIC | Age: 68
End: 2018-01-30
Payer: MEDICARE

## 2018-01-30 DIAGNOSIS — Z98.890 POST-OPERATIVE STATE: Primary | ICD-10-CM

## 2018-01-30 DIAGNOSIS — H02.423 MYOGENIC PTOSIS OF BILATERAL EYELIDS: ICD-10-CM

## 2018-01-30 DIAGNOSIS — H02.823 SEBACEOUS CYST OF EYELID, RIGHT: ICD-10-CM

## 2018-01-30 DIAGNOSIS — H02.839 DERMATOCHALASIS OF EYELID: ICD-10-CM

## 2018-01-30 DIAGNOSIS — H16.213 EXPOSURE KERATOPATHY, BILATERAL: ICD-10-CM

## 2018-01-30 PROCEDURE — 99999 PR PBB SHADOW E&M-EST. PATIENT-LVL II: CPT | Mod: PBBFAC,,, | Performed by: OPHTHALMOLOGY

## 2018-01-30 PROCEDURE — 99024 POSTOP FOLLOW-UP VISIT: CPT | Mod: S$GLB,,, | Performed by: OPHTHALMOLOGY

## 2018-01-30 NOTE — PROGRESS NOTES
HPI     Mrs. Rivera is here today for a post-op visit bilateral MMCR and   bilateral blepharoplasty on 11/13/2017. She states she is doing well. She   states her eyes have been dry, no other problems or complaints. She woke   up with pain in the right eye. She denies any eye pain or irritation. She   is using Genteal katie at bedtime OU     Last edited by Pavithra Maher, PCT on 1/30/2018 11:30 AM. (History)            Assessment /Plan     For exam results, see Encounter Report.    Post-operative state  -     External/Slit Lamp Photography    Myogenic ptosis of bilateral eyelids    Dermatochalasis of eyelid    Sebaceous cyst of eyelid, right    Exposure keratopathy, bilateral      Patient doing well! Post-operative instructions reviewed. All questions answered.   Continue genteal katie qhs       Patient bothered by irritation of rul sebaceous cyst. Plan for rul cyst removal in minor room.     Informed consent obtained after extensive risks/benefits/alternatives were discussed with the patient including but not limited to pain, bleeding, infection, ocular injury, loss of the eye, asymmetry, need for revision in future, scarring.  Alternatives such as waiting were discussed.  All questions were answered.      Return for surgery

## 2018-02-07 ENCOUNTER — PATIENT MESSAGE (OUTPATIENT)
Dept: INTERNAL MEDICINE | Facility: CLINIC | Age: 68
End: 2018-02-07

## 2018-02-07 DIAGNOSIS — F43.21 ADJUSTMENT DISORDER WITH DEPRESSED MOOD: ICD-10-CM

## 2018-02-07 DIAGNOSIS — I10 ESSENTIAL HYPERTENSION: ICD-10-CM

## 2018-02-08 RX ORDER — AMLODIPINE BESYLATE 10 MG/1
10 TABLET ORAL DAILY
Qty: 90 TABLET | Refills: 0 | Status: SHIPPED | OUTPATIENT
Start: 2018-02-08 | End: 2018-04-17 | Stop reason: SDUPTHER

## 2018-02-08 RX ORDER — PAROXETINE HYDROCHLORIDE 40 MG/1
40 TABLET, FILM COATED ORAL DAILY
Qty: 90 TABLET | Refills: 0 | Status: SHIPPED | OUTPATIENT
Start: 2018-02-08 | End: 2018-04-17 | Stop reason: SDUPTHER

## 2018-02-21 ENCOUNTER — TELEPHONE (OUTPATIENT)
Dept: INTERNAL MEDICINE | Facility: CLINIC | Age: 68
End: 2018-02-21

## 2018-02-21 NOTE — TELEPHONE ENCOUNTER
----- Message from Shane Combs sent at 2/20/2018  9:49 AM CST -----  Contact: Patient [phone]  Is this a refill or new RX:  Refill    RX name and strength: amLODIPine (NORVASC) 10 MG tablet and paroxetine (PAXIL) 40 MG tablet     Pharmacy name and phone #: [id number] - [address] AT [facility] [phone] (phone) [phone] (Fax)        Comments:

## 2018-02-26 ENCOUNTER — TELEPHONE (OUTPATIENT)
Dept: OPHTHALMOLOGY | Facility: CLINIC | Age: 68
End: 2018-02-26

## 2018-02-26 ENCOUNTER — PATIENT MESSAGE (OUTPATIENT)
Dept: INTERNAL MEDICINE | Facility: CLINIC | Age: 68
End: 2018-02-26

## 2018-02-26 NOTE — TELEPHONE ENCOUNTER
----- Message from Maira Zacarias sent at 2/26/2018  3:06 PM CST -----  Contact: Nicole Santiago called to f/u with a staff about her procedure coverage and instructions. Pt can be reached at [phone].

## 2018-03-14 DIAGNOSIS — F41.1 GAD (GENERALIZED ANXIETY DISORDER): ICD-10-CM

## 2018-03-14 RX ORDER — ALPRAZOLAM 2 MG/1
TABLET ORAL
Qty: 60 TABLET | Refills: 0 | Status: SHIPPED | OUTPATIENT
Start: 2018-03-14 | End: 2018-04-17 | Stop reason: SDUPTHER

## 2018-04-09 ENCOUNTER — OFFICE VISIT (OUTPATIENT)
Dept: DERMATOLOGY | Facility: CLINIC | Age: 68
End: 2018-04-09
Payer: MEDICARE

## 2018-04-09 ENCOUNTER — PROCEDURE VISIT (OUTPATIENT)
Dept: OPHTHALMOLOGY | Facility: CLINIC | Age: 68
End: 2018-04-09
Payer: MEDICARE

## 2018-04-09 VITALS — SYSTOLIC BLOOD PRESSURE: 123 MMHG | HEART RATE: 73 BPM | DIASTOLIC BLOOD PRESSURE: 83 MMHG

## 2018-04-09 DIAGNOSIS — D18.01 ANGIOMA OF SKIN: ICD-10-CM

## 2018-04-09 DIAGNOSIS — H02.823 SEBACEOUS CYST OF EYELID, RIGHT: ICD-10-CM

## 2018-04-09 DIAGNOSIS — D22.9 MULTIPLE BENIGN NEVI: ICD-10-CM

## 2018-04-09 DIAGNOSIS — H02.9 EYELID LESION: Primary | ICD-10-CM

## 2018-04-09 DIAGNOSIS — L82.1 SEBORRHEIC KERATOSES: ICD-10-CM

## 2018-04-09 DIAGNOSIS — L82.0 INFLAMED SEBORRHEIC KERATOSIS: ICD-10-CM

## 2018-04-09 DIAGNOSIS — D48.5 NEOPLASM OF UNCERTAIN BEHAVIOR OF SKIN: Primary | ICD-10-CM

## 2018-04-09 DIAGNOSIS — H02.423 MYOGENIC PTOSIS OF BILATERAL EYELIDS: ICD-10-CM

## 2018-04-09 PROCEDURE — 88342 IMHCHEM/IMCYTCHM 1ST ANTB: CPT | Mod: 26,,, | Performed by: PATHOLOGY

## 2018-04-09 PROCEDURE — 17110 DESTRUCTION B9 LES UP TO 14: CPT | Mod: S$GLB,,, | Performed by: DERMATOLOGY

## 2018-04-09 PROCEDURE — 11100 PR BIOPSY OF SKIN LESION: CPT | Mod: 59,S$GLB,, | Performed by: DERMATOLOGY

## 2018-04-09 PROCEDURE — 11440 EXC FACE-MM B9+MARG 0.5 CM/<: CPT | Mod: S$GLB,,, | Performed by: OPHTHALMOLOGY

## 2018-04-09 PROCEDURE — 99203 OFFICE O/P NEW LOW 30 MIN: CPT | Mod: 25,S$GLB,, | Performed by: DERMATOLOGY

## 2018-04-09 PROCEDURE — 88305 TISSUE EXAM BY PATHOLOGIST: CPT | Performed by: PATHOLOGY

## 2018-04-09 PROCEDURE — 99499 UNLISTED E&M SERVICE: CPT | Mod: S$GLB,,, | Performed by: OPHTHALMOLOGY

## 2018-04-09 RX ORDER — NEOMYCIN SULFATE, POLYMYXIN B SULFATE AND DEXAMETHASONE 3.5; 10000; 1 MG/ML; [USP'U]/ML; MG/ML
1 SUSPENSION/ DROPS OPHTHALMIC EVERY 6 HOURS
Qty: 5 ML | Refills: 0 | Status: SHIPPED | OUTPATIENT
Start: 2018-04-09 | End: 2018-04-17

## 2018-04-09 NOTE — PROGRESS NOTES
Assessment /Plan     For exam results, see Encounter Report.    Eyelid lesion  -     External/Slit Lamp Photography  -     Tissue Specimen To Pathology, Ophthalmology    Myogenic ptosis of bilateral eyelids    Sebaceous cyst of eyelid, right    Other orders  -     neomycin-polymyxin-dexamethasone (MAXITROL) 3.5mg/mL-10,000 unit/mL-0.1 % DrpS; Place 1 drop into the right eye every 6 (six) hours.  Dispense: 5 mL; Refill: 0      Patient bothered by irritation of sebaceous cyst right upper eyelid. Plan for rul cyst removal in minor room today    Informed consent obtained after extensive risks/benefits/alternatives were discussed with the patient including but not limited to pain, bleeding, infection, ocular injury, loss of the eye, asymmetry, need for revision in future, scarring.  Alternatives such as waiting were discussed.  All questions were answered.      Procedure Note    Attending: Jamila Berman  Resident: Yassine Trinh  Pre-op Dx: Eyelid lesion, right   Post-op Dx: same  Local: 2% lidocaine with epinephrine with 0.5% marcaine and 4% NaHCO3 and vitrase  Specimens:  right eyelid lesion  Complications: None  Blood Loss: minimal    The patient was prepped and draped in the usual sterile manner for ophthalmic plastic surgery.  A corneal shield was placed in the right palpebral fissure. 0.5 cc of local anesthesia was given to the right eyelid. Surgical forceps were used to elevate the skin over the lesion and sharp Shanice scissors were used to excise the lesion at the base. The tissue was sent to pathology. High-temp cautery was used to obtain hemostasis. The corneal shield was removed. Maxitrol ointment was applied  to the wound. The patient tolerated the procedure well. There were no complications.    Post-operative instructions were given to the patient.     Return PRN    I have reviewed and concur with the resident's history, physical, assessment, and plan.  I have personally interviewed and examined the  patient.

## 2018-04-09 NOTE — PROGRESS NOTES
Subjective:       Patient ID:  Nicole Jacques is a 68 y.o. female who presents for   Chief Complaint   Patient presents with    Skin Check     TBSE    Mole     right temple/right breast, color change, 30 years     Pt is here today for TBSE with a c/o of moles to right temple and right breast. Pt states that she has had the mole on her right temple for years. She states that it has changed in color. Denies pain or bleeding. She states that the moles on her breast have also been present for years. They appear bigger. Pt's OB/GYN recommended her getting those checked by a dermatologist. She also denies pain or bleeding in these moles, but one on her right breast sticks out more than others and gets red and irritated by her bra.       Mole  - Initial  Affected locations: face and chest  Duration: 30 years  Signs / symptoms: growing (color change)  Severity: mild  Timing: constant  Aggravated by: nothing  Relieving factors/Treatments tried: nothing  Improvement on treatment: no relief        Review of Systems   Skin: Positive for activity-related sunscreen use and wears hat. Negative for daily sunscreen use and recent sunburn.   Hematologic/Lymphatic: Bruises/bleeds easily.        Bruise        Objective:    Physical Exam   Constitutional: She appears well-developed and well-nourished. No distress.   Neurological: She is alert and oriented to person, place, and time. She is not disoriented.   Psychiatric: She has a normal mood and affect.   Skin:   Areas Examined (abnormalities noted in diagram):   Scalp / Hair Palpated and Inspected  Head / Face Inspection Performed  Neck Inspection Performed  Chest / Axilla Inspection Performed  Abdomen Inspection Performed  Back Inspection Performed  RUE Inspected  LUE Inspection Performed  Nails and Digits Inspection Performed                   Diagram Legend     Erythematous scaling macule/papule c/w actinic keratosis       Vascular papule c/w angioma      Pigmented verrucoid  papule/plaque c/w seborrheic keratosis      Yellow umbilicated papule c/w sebaceous hyperplasia      Irregularly shaped tan macule c/w lentigo     1-2 mm smooth white papules consistent with Milia      Movable subcutaneous cyst with punctum c/w epidermal inclusion cyst      Subcutaneous movable cyst c/w pilar cyst      Firm pink to brown papule c/w dermatofibroma      Pedunculated fleshy papule(s) c/w skin tag(s)      Evenly pigmented macule c/w junctional nevus     Mildly variegated pigmented, slightly irregular-bordered macule c/w mildly atypical nevus      Flesh colored to evenly pigmented papule c/w intradermal nevus       Pink pearly papule/plaque c/w basal cell carcinoma      Erythematous hyperkeratotic cursted plaque c/w SCC      Surgical scar with no sign of skin cancer recurrence      Open and closed comedones      Inflammatory papules and pustules      Verrucoid papule consistent consistent with wart     Erythematous eczematous patches and plaques     Dystrophic onycholytic nail with subungual debris c/w onychomycosis     Umbilicated papule    Erythematous-base heme-crusted tan verrucoid plaque consistent with inflamed seborrheic keratosis     Erythematous Silvery Scaling Plaque c/w Psoriasis     See annotation      Assessment / Plan:      Pathology Orders:     Normal Orders This Visit    Tissue Specimen To Pathology, Dermatology     Questions:    Directional Terms:  Other(comment)    Clinical information:  r/o macular Sk vs. lentigo maligna    Specific Site:  left cheek        Neoplasm of uncertain behavior of skin  -     Tissue Specimen To Pathology, Dermatology  Shave biopsy procedure note:  Risk, benefits, and alternatives are discussed with the patient, including risk of infection, scar, recurrence, and need for additional treatment of site. The patient agrees to the procedure by verbal consent. The area is marked and prepped with alcohol.  Approximately 1 mL of lidocaine 1% with epinephrine is used  for local anesthesia. A sharp blade is used to remove part or all of the lesion. The specimen is sent for pathology. Hemostasis is obtained with aluminum chloride and/or monopolar hyfrecation if needed. The area is then dressed and bandaged. The patient tolerated the procedure well without adverse event. Written instructions on wound care were given and were reviewed with the patient, who is to call for any signs of bleeding or infection. The patient will be notified of the pathology results.    Seborrheic keratoses  These are benign, inherited growths without a malignant potential. Reassurance given to patient. No treatment is necessary. Handout was provided.    Angioma of skin  This is a benign vascular lesion. Reassurance given. No treatment required.    Multiple benign nevi  Multiple benign-appearing nevi present on exam today. Reassurance provided. Counseled patient to periodically examine moles and return to clinic if any moles change or become symptomatic.    Inflamed seborrheic keratosis  Cryosurgery procedure note:  Verbal consent obtained from patient. Liquid nitrogen cryosurgery applied to 1 lesion(s) to produce a freeze injury. Counseled patient that blisters may form and instructed patient on wound care with gentle cleansing and use of Vaseline ointment to keep moist until healed. Handout was provided, and patient was instructed to return to clinic in 1-2 months if lesions do not completely resolve.             Follow-up in about 4 weeks (around 5/7/2018), or if symptoms worsen or fail to improve.

## 2018-04-09 NOTE — PATIENT INSTRUCTIONS
"Shave Biopsy Wound Care    Your doctor has performed a shave biopsy today.  A bandaid and Vaseline ointment has been placed over the site. This should remain in place for 24 hours.  It is recommended that you keep the area dry for the first 24 hours. After 24 hours, you may remove the bandaid and wash the area with warm soap and water, then apply Vaseline jelly. Many patients prefer to use Neosporin or Bacitracin ointment. This is acceptable; however, know that you can develop an allergy to this medication even if you have used it safely for years. It is important to keep the area moist with ointment. Letting it dry out and get air slows healing time and will worsen the scar. Continue to apply Vaseline and a bandaid until the area is completely healed.    If you notice increasing redness, tenderness, pain, or yellow drainage at the biopsy site, please notify your doctor. These are signs of an infection.    If your biopsy site is bleeding, apply firm pressure for 15 minutes straight.  Repeat for another 15 minutes, if it is still bleeding.   If the surgical site continues to bleed, then please contact your doctor.      [facility] [place] [address]/ [phone] FAX/ [email or web address]      Notice to [facility] users:    You will receive a [facility] notification after the pathologist has finished reviewing your biopsy specimen. Pathology results, however, will not be released online, so you will see a no content" message.  Once your dermatologist reviews and interprets your biopsy results, you will either receive a letter in the mail with the results or a phone call from your doctors office if further explanation or treatment is warranted.    "

## 2018-04-11 ENCOUNTER — TELEPHONE (OUTPATIENT)
Dept: INTERNAL MEDICINE | Facility: CLINIC | Age: 68
End: 2018-04-11

## 2018-04-11 DIAGNOSIS — K21.9 GASTROESOPHAGEAL REFLUX DISEASE, ESOPHAGITIS PRESENCE NOT SPECIFIED: ICD-10-CM

## 2018-04-11 RX ORDER — OMEPRAZOLE 20 MG/1
20 CAPSULE, DELAYED RELEASE ORAL DAILY
Qty: 90 CAPSULE | Refills: 0 | Status: SHIPPED | OUTPATIENT
Start: 2018-04-11 | End: 2018-04-24 | Stop reason: SDUPTHER

## 2018-04-11 NOTE — TELEPHONE ENCOUNTER
----- Message from Rocío Maya sent at 4/11/2018 11:01 AM CDT -----  Contact: Patient [phone]  Rx Name: omeprazole (PRILOSEC) 20 MG capsule    Refill: yes    Pharmacy: [facility] [id number]    Comments: 90 day supply    Please call and advise.    Thank You

## 2018-04-13 ENCOUNTER — TELEPHONE (OUTPATIENT)
Dept: DERMATOLOGY | Facility: CLINIC | Age: 68
End: 2018-04-13

## 2018-04-17 ENCOUNTER — OFFICE VISIT (OUTPATIENT)
Dept: INTERNAL MEDICINE | Facility: CLINIC | Age: 68
End: 2018-04-17
Payer: MEDICARE

## 2018-04-17 VITALS
HEART RATE: 68 BPM | WEIGHT: 163.13 LBS | HEIGHT: 65 IN | SYSTOLIC BLOOD PRESSURE: 136 MMHG | BODY MASS INDEX: 27.18 KG/M2 | DIASTOLIC BLOOD PRESSURE: 88 MMHG

## 2018-04-17 DIAGNOSIS — F13.20 SEDATIVE, HYPNOTIC OR ANXIOLYTIC DEPENDENCE, UNCOMPLICATED: ICD-10-CM

## 2018-04-17 DIAGNOSIS — I10 ESSENTIAL HYPERTENSION: Primary | ICD-10-CM

## 2018-04-17 DIAGNOSIS — F41.1 GAD (GENERALIZED ANXIETY DISORDER): ICD-10-CM

## 2018-04-17 DIAGNOSIS — R73.03 PRE-DIABETES: ICD-10-CM

## 2018-04-17 DIAGNOSIS — F43.21 ADJUSTMENT DISORDER WITH DEPRESSED MOOD: ICD-10-CM

## 2018-04-17 DIAGNOSIS — M15.9 PRIMARY OSTEOARTHRITIS INVOLVING MULTIPLE JOINTS: ICD-10-CM

## 2018-04-17 PROCEDURE — 99214 OFFICE O/P EST MOD 30 MIN: CPT | Mod: S$GLB,,, | Performed by: INTERNAL MEDICINE

## 2018-04-17 PROCEDURE — 3075F SYST BP GE 130 - 139MM HG: CPT | Mod: CPTII,S$GLB,, | Performed by: INTERNAL MEDICINE

## 2018-04-17 PROCEDURE — 99499 UNLISTED E&M SERVICE: CPT | Mod: S$GLB,,, | Performed by: INTERNAL MEDICINE

## 2018-04-17 PROCEDURE — 99999 PR PBB SHADOW E&M-EST. PATIENT-LVL III: CPT | Mod: PBBFAC,,, | Performed by: INTERNAL MEDICINE

## 2018-04-17 PROCEDURE — 3079F DIAST BP 80-89 MM HG: CPT | Mod: CPTII,S$GLB,, | Performed by: INTERNAL MEDICINE

## 2018-04-17 RX ORDER — ALPRAZOLAM 2 MG/1
2 TABLET ORAL 2 TIMES DAILY PRN
Qty: 60 TABLET | Refills: 5 | Status: SHIPPED | OUTPATIENT
Start: 2018-04-17 | End: 2018-10-04 | Stop reason: SDUPTHER

## 2018-04-17 RX ORDER — LOSARTAN POTASSIUM 50 MG/1
50 TABLET ORAL DAILY
Qty: 90 TABLET | Refills: 2 | Status: SHIPPED | OUTPATIENT
Start: 2018-04-17 | End: 2018-05-02 | Stop reason: SDUPTHER

## 2018-04-17 RX ORDER — MELOXICAM 15 MG/1
15 TABLET ORAL DAILY
Qty: 90 TABLET | Refills: 2 | Status: SHIPPED | OUTPATIENT
Start: 2018-04-17 | End: 2018-05-02 | Stop reason: SDUPTHER

## 2018-04-17 RX ORDER — AMLODIPINE BESYLATE 10 MG/1
10 TABLET ORAL DAILY
Qty: 90 TABLET | Refills: 2 | Status: SHIPPED | OUTPATIENT
Start: 2018-04-17 | End: 2018-05-02 | Stop reason: SDUPTHER

## 2018-04-17 RX ORDER — PAROXETINE HYDROCHLORIDE 40 MG/1
40 TABLET, FILM COATED ORAL DAILY
Qty: 90 TABLET | Refills: 2 | Status: SHIPPED | OUTPATIENT
Start: 2018-04-17 | End: 2018-05-02 | Stop reason: SDUPTHER

## 2018-04-19 PROBLEM — H02.423 MYOGENIC PTOSIS OF BILATERAL EYELIDS: Status: RESOLVED | Noted: 2017-11-13 | Resolved: 2018-04-19

## 2018-04-19 PROBLEM — H02.413 MECHANICAL PTOSIS OF BILATERAL EYELIDS: Status: RESOLVED | Noted: 2017-11-13 | Resolved: 2018-04-19

## 2018-04-20 NOTE — PROGRESS NOTES
Subjective:       Patient ID: Nicole Jacques is a 68 y.o. female.    Chief Complaint: Hypertension    Last seen by me 13 months ago. Had follow up with WALTER Turner , annual labs done then. Presents for f/u chronic medical conditions with no new complaints. Taking meds as prescribed. Home blood pressure readings are generally normal - 120's/70-80.    PMH: , misc x 1.    Hypertension.  Pre-Diabetes, HbA1c up to 6.3%.  GERD, EGD  only showed hiatal hernia.  Depression with Anxiety.    Lumbar Spondylosis.  Mild OA Knees.   Sigmoid Diverticulosis.     Colonoscopy 11/15 normal. Mammogram normal . Pelvic exam 10/16. BMD normal 12/15. Eye exam . Flu shot  at [facility]. EKG normal . Pneumovax . Prevnar 8/15. Has had Zostavax. Labs : CBC normal, CMP normal, HbA1c 5.6%, TSH 1.155, Vit D 51, TChol 199, TG 81, HDL 75, .    SOCIAL: No tobacco,  smokes. Social alcohol. No illicit drug usage.  with 2 adult children who live locally. 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     PSH: BTL, Hysterectomy due to heavy menses, ovaries remain. Left knee arthroscopic surgery due to injury kick boxing. Appendectomy. Bilateral Cataract extractions.     FMH: Father  age 81 with bladder cancer and dementia. Mother  age 78 with hypertension and renal failure, and CHF, possible osteoporosis. Diabetes in grandmothers. Anxiety - mother, sister. Colon cancer in maternal uncle.    Allergies: Bactrim.    Medications: list reviewed and reconciled. Uses Meloxicam every day for back and knee pain. And uses Omeprazole every day.               Review of Systems   Constitutional: Negative for activity change, appetite change, fatigue, fever and unexpected weight change.   HENT: Negative for congestion, hearing loss, rhinorrhea, sneezing, sore throat, trouble swallowing and voice change.    Eyes: Negative for pain and visual disturbance.   Respiratory: Negative for cough, chest  "tightness, shortness of breath and wheezing.    Cardiovascular: Negative for chest pain, palpitations and leg swelling.   Gastrointestinal: Negative for abdominal pain, blood in stool, constipation, diarrhea, nausea and vomiting.   Genitourinary: Negative for difficulty urinating, dysuria, flank pain, frequency, hematuria and urgency.   Musculoskeletal: Positive for arthralgias. Negative for back pain, joint swelling, myalgias and neck pain.   Skin: Negative for color change and rash.   Neurological: Positive for tremors. Negative for dizziness, syncope, facial asymmetry, speech difficulty, weakness, numbness and headaches.   Hematological: Negative for adenopathy. Does not bruise/bleed easily.   Psychiatric/Behavioral: Negative for agitation, dysphoric mood and sleep disturbance. The patient is nervous/anxious.        Objective:    /88, Pulse 68, Ht 5' 5", Wt 163 lbs, BMI=27  Physical Exam   Constitutional: She is oriented to person, place, and time. She appears well-developed and well-nourished. No distress.   HENT:   Head: Normocephalic and atraumatic.   Right Ear: External ear normal.   Left Ear: External ear normal.   Nose: Nose normal.   Mouth/Throat: Oropharynx is clear and moist. No oropharyngeal exudate.   Eyes: Conjunctivae and EOM are normal. Pupils are equal, round, and reactive to light. Right conjunctiva is not injected. Left conjunctiva is not injected. No scleral icterus.   Neck: Normal range of motion. Neck supple. No JVD present. Carotid bruit is not present. No thyromegaly present.   Cardiovascular: Normal rate, regular rhythm, normal heart sounds and intact distal pulses.  Exam reveals no gallop and no friction rub.    No murmur heard.  Pulmonary/Chest: Effort normal and breath sounds normal. No respiratory distress. She has no wheezes. She has no rhonchi. She has no rales.   Abdominal: Soft. Bowel sounds are normal. She exhibits no distension and no mass. There is no hepatosplenomegaly. " There is no tenderness. There is no CVA tenderness.   Musculoskeletal: Normal range of motion. She exhibits no edema, tenderness or deformity.   Lymphadenopathy:     She has no cervical adenopathy.   Neurological: She is alert and oriented to person, place, and time. She has normal strength and normal reflexes. No cranial nerve deficit. She exhibits normal muscle tone. Coordination and gait normal.   Tremor only present when anxious while talking.    Skin: Skin is warm and dry. No lesion and no rash noted. She is not diaphoretic. No cyanosis or erythema. No pallor. Nails show no clubbing.   Psychiatric: She has a normal mood and affect. Her behavior is normal. Judgment and thought content normal.   Vitals reviewed.      Assessment:       1. Essential hypertension    2. Pre-diabetes    3. Primary osteoarthritis involving multiple joints    4. ROSALIA (generalized anxiety disorder)    5. Adjustment disorder with depressed mood    6. Sedative, hypnotic or anxiolytic dependence, uncomplicated        Plan:       Essential hypertension controlled per home monitoring, continue same.  -     amLODIPine (NORVASC) 10 MG tablet; Take 1 tablet (10 mg total) by mouth once daily.  Dispense: 90 tablet; Refill: 2  -     losartan (COZAAR) 50 MG tablet; Take 1 tablet (50 mg total) by mouth once daily.  Dispense: 90 tablet; Refill: 2    Pre-diabetes - improved with diet and exercise.     Primary osteoarthritis involving multiple joints  -     meloxicam (MOBIC) 15 MG tablet; Take 1 tablet (15 mg total) by mouth once daily.  Dispense: 90 tablet; Refill: 2 - try alternating with Tylenol to reduce risk of chronic NSAID use.    ROSALIA (generalized anxiety disorder)  -     ALPRAZolam (XANAX) 2 MG Tab; Take 1 tablet (2 mg total) by mouth 2 (two) times daily as needed.  Dispense: 60 tablet; Refill: 5    Adjustment disorder with depressed mood  -     paroxetine (PAXIL) 40 MG tablet; Take 1 tablet (40 mg total) by mouth once daily.  Dispense: 90  tablet; Refill: 2    Sedative, hypnotic or anxiolytic dependence, uncomplicated

## 2018-04-23 ENCOUNTER — NURSE TRIAGE (OUTPATIENT)
Dept: ADMINISTRATIVE | Facility: CLINIC | Age: 68
End: 2018-04-23

## 2018-04-23 NOTE — TELEPHONE ENCOUNTER
Reason for Disposition   Caller requesting a NON-URGENT new prescription or refill and triager unable to refill per unit policy     Nicole wants refill of estradiol from Dr Black, but wants to make sure you know it goes to [facility] pharmacy.  Encouraged her to send Dr Black the new address and phone number through [facility].    Protocols used: ST MEDICATION QUESTION CALL-A-AH

## 2018-04-24 DIAGNOSIS — N95.1 MENOPAUSAL SYMPTOMS: ICD-10-CM

## 2018-04-24 RX ORDER — OMEPRAZOLE 20 MG/1
20 CAPSULE, DELAYED RELEASE ORAL DAILY
Qty: 90 CAPSULE | Refills: 2 | Status: SHIPPED | OUTPATIENT
Start: 2018-04-24 | End: 2018-05-02 | Stop reason: SDUPTHER

## 2018-04-24 RX ORDER — ESTRADIOL 1 MG/1
1 TABLET ORAL DAILY
Qty: 90 TABLET | Refills: 3 | Status: SHIPPED | OUTPATIENT
Start: 2018-04-24 | End: 2018-06-07 | Stop reason: SDUPTHER

## 2018-04-25 ENCOUNTER — OFFICE VISIT (OUTPATIENT)
Dept: SPORTS MEDICINE | Facility: CLINIC | Age: 68
End: 2018-04-25
Payer: MEDICARE

## 2018-04-25 ENCOUNTER — HOSPITAL ENCOUNTER (OUTPATIENT)
Dept: RADIOLOGY | Facility: HOSPITAL | Age: 68
Discharge: HOME | End: 2018-04-25
Attending: FAMILY MEDICINE
Payer: MEDICARE

## 2018-04-25 VITALS — HEIGHT: 65 IN | TEMPERATURE: 98 F | WEIGHT: 163 LBS | BODY MASS INDEX: 27.16 KG/M2

## 2018-04-25 DIAGNOSIS — M25.511 RIGHT SHOULDER PAIN, UNSPECIFIED CHRONICITY: ICD-10-CM

## 2018-04-25 DIAGNOSIS — M67.911 DYSFUNCTION OF RIGHT ROTATOR CUFF: ICD-10-CM

## 2018-04-25 DIAGNOSIS — M77.11 RIGHT LATERAL EPICONDYLITIS: Primary | ICD-10-CM

## 2018-04-25 PROCEDURE — 99214 OFFICE O/P EST MOD 30 MIN: CPT | Mod: S$GLB,,, | Performed by: FAMILY MEDICINE

## 2018-04-25 PROCEDURE — 73030 X-RAY EXAM OF SHOULDER: CPT | Mod: 26,RT,, | Performed by: RADIOLOGY

## 2018-04-25 PROCEDURE — 99999 PR PBB SHADOW E&M-EST. PATIENT-LVL III: CPT | Mod: PBBFAC,,, | Performed by: FAMILY MEDICINE

## 2018-04-25 PROCEDURE — 73030 X-RAY EXAM OF SHOULDER: CPT | Mod: TC,FY,PO,RT

## 2018-04-25 NOTE — PROGRESS NOTES
Nicole Jacques, a 68 y.o. female, is here for evaluation of RIGHT shoulder pain.     HISTORY OF PRESENT ILLNESS  Hand dominance, left    Location:  anterior and lateral, right  Onset:  Insidious, chronic    Palliative:     Relative rest   Oral analgesics  Provocative:    ADLs at times   Tennis activities at times  Prior:     PMH knee pain  Progression: slowly resolving discomfort  Quality:    Ache at times  Sharp at times  Radiation:  occ R UE  Severity:  per nursing documentation  Timing:  intermittent with use  Trauma:  none    Review of systems (ROS):  A 10+ review of systems was performed with pertinent positives and negatives noted above in the history of present illness. Other systems were negative unless otherwise specified.      PHYSICAL EXAMINATION  General:  The patient is alert and oriented x 3. Mood is pleasant. Observation of ears, eyes and nose reveal no gross abnormalities. HEENT: NCAT, sclera anicteric. Lungs: Respirations are equal and unlabored.     RIGHT SHOULDER EXAMINATION     OBSERVATION:     Swelling  none  Deformity  none   Discoloration  none   Scapular winging none   Scars   none  Atrophy  none    TENDERNESS / CREPITUS (T/C):          T/C      T/C   Clavicle   -/-  SUPRAspinatus    -/-     AC Jt.    -/-  INFRAspinatus  -/-    SC Jt.    -/-  Deltoid    -/-      G. Tuberosity  -/-  LH BICEP groove  -/-   Acromion:  -/-  Midline Neck   -/-     Scapular Spine -/-  Trapezium   -/-   SMA Scapula  -/-  GH jt. line - post  -/-     Scapulothoracic  -/-         ROM:     Right shoulder   Left shoulder        AROM (PROM)   AROM (PROM)   FE    170° (175°)     170° (175°)     ER at 0°    60°  (65°)    60°  (65°)   ER at 90° ABD  90°  (90°)    90°  (90°)   IR at 90°  ABD   NA  (40°)     NA  (40°)      IR (spine level)   T10     T10    STRENGTH: (* = with pain) RIGHT SHOULDER  LEFT  SHOULDER   SCAPTION   4/5    5/5    IR    4/5    5/5   ER    4/5    5/5   BICEPS   4/5    5/5   Deltoid    4/5    5/5     SIGNS:  Painful side       NEER   +   OJUVENALS        -    RIVAS   +   SPEEDS        -   DROP ARM   -   BELLY PRESS       -    X-Body ADD    -   LIFT-OFF        -   HORNBLOWERS      -              STABILITY TESTING   RIGHT SHOULDER  LEFT SHOULDER     Translation     Anterior up face    up face    Posterior up face   up face    Sulcus  < 10mm   < 10 mm     Signs   Apprehension   neg     neg       Relocation   no change    no change      Jerk test  neg    neg    EXTREMITY NEURO-VASCULAR EXAM    Sensation grossly intact to light touch all dermatomal regions.    DTR 2+ Biceps, Triceps, BR and Negative Salbadors sign   Grossly intact motor function at Elbow, Wrist and Hand   Distal pulses radial and ulnar 2+, brisk cap refill, symmetric.      NECK:  Painless FROM and spinous processes non-tender. Negative Spurlings sign.       Other Findings:    ASSESSMENT & PLAN   Assessment:   #1 mild OA of GH, right (non dominant)   W/ mild supraspinatus tendinosis  #2 lateral epicondylitis, right (non dominant)     No evidence of vascular pathology    Imaging studies reviewed:   X-ray shoulder, right 18.04    Plan:  We discussed options including:  #1 watchful waiting  #2 physical therapy aimed at:   RoM glenohumeral joint   Strengthening rotator cuff   Eccentric strengthening of forearm extensor musculature   Scapular stability  #3 injection therapy:   CSI SAB    Right,     Left,    CSI iaGH    Right,     Left,    orthobiologics   #4 MRI for further evaluation     The patient chooses #2    Pain management: handout given  Bracing:   Physical therapy: fPT, @ [facility], begin as above   Activity (e.g. sports, work) restrictions: as tolerated   school/vocation: active, tennis (3/wk, year round)    Follow up in 12 w  A/e fPT  Should symptoms worsen or fail to resolve, consider:  Revisiting the above  options

## 2018-04-25 NOTE — TELEPHONE ENCOUNTER
----- Message from Pura Garcia sent at 4/25/2018  9:28 AM CDT -----  Contact:  [facility] [employer] [facility] [place] [phone]  Maggie is needing a call back, regarding this pt's PA, she can be reached at [phone].

## 2018-04-26 ENCOUNTER — TELEPHONE (OUTPATIENT)
Dept: OBSTETRICS AND GYNECOLOGY | Facility: CLINIC | Age: 68
End: 2018-04-26

## 2018-04-26 NOTE — TELEPHONE ENCOUNTER
----- Message from Pura Garcia sent at 4/26/2018  9:47 AM CDT -----  Contact: self  Pt needing a call back, regarding her Estradiol Rx, she can be reached at [phone].

## 2018-04-26 NOTE — TELEPHONE ENCOUNTER
Spoke with patient and scheduled appointment. Patient was also told that we are waiting on approval from PA that was done.

## 2018-05-01 ENCOUNTER — PATIENT MESSAGE (OUTPATIENT)
Dept: INTERNAL MEDICINE | Facility: CLINIC | Age: 68
End: 2018-05-01

## 2018-05-01 DIAGNOSIS — I10 ESSENTIAL HYPERTENSION: ICD-10-CM

## 2018-05-01 DIAGNOSIS — F43.21 ADJUSTMENT DISORDER WITH DEPRESSED MOOD: ICD-10-CM

## 2018-05-01 DIAGNOSIS — N95.1 MENOPAUSAL SYMPTOMS: ICD-10-CM

## 2018-05-01 RX ORDER — ESTRADIOL 1 MG/1
1 TABLET ORAL DAILY
Qty: 90 TABLET | Refills: 3 | Status: CANCELLED | OUTPATIENT
Start: 2018-05-01

## 2018-05-01 NOTE — TELEPHONE ENCOUNTER
"----- Message from Iris Freeman sent at 5/1/2018  3:00 PM CDT -----  Contact: [facility]/[phone]  RX request - refill or new RX.  Is this a refill or new RX:  refill  RX name and strength: amLODIPine (NORVASC) 10 MG tablet  Directions: Take 1 tablet (10 mg total) by mouth once daily  Is this a 30 day or 90 day RX:  90 day  Pharmacy name and phone # (DON'T enter "on file" or "in chart"): [facility] [phone] (Phone) [phone] (Fax)  Comments:      RX request - refill or new RX.  Is this a refill or new RX:  refill  RX name and strength: losartan (COZAAR) 50 MG tablet  Directions: Take 1 tablet (50 mg total) by mouth once daily  Is this a 30 day or 90 day RX:  90 day  Pharmacy name and phone # (DON'T enter "on file" or "in chart"):  [facility] [phone] (Phone) [phone] (Fax)  Comments:      RX request - refill or new RX.  Is this a refill or new RX:  refill  RX name and strength: meloxicam (MOBIC) 15 MG tablet  Directions: Take 1 tablet (15 mg total) by mouth once daily  Is this a 30 day or 90 day RX:  90day  Pharmacy name and phone # (DON'T enter "on file" or "in chart"):  [facility] [phone] (Phone) [phone] (Fax)  Comments:      RX request - refill or new RX.  Is this a refill or new RX:  refill  RX name and strength: omeprazole (PRILOSEC) 20 MG capsule  Directions: Take 1 capsule (20 mg total) by mouth once daily  Is this a 30 day or 90 day RX:  90 day  Pharmacy name and phone # (DON'T enter "on file" or "in chart"):  [facility] [phone] (Phone) [phone] (Fax)  Comments:        "

## 2018-05-01 NOTE — TELEPHONE ENCOUNTER
----- Message from Eduar Ibrahim sent at 5/1/2018 12:51 PM CDT -----  Contact: pt            Name of Who is Calling: pt      What is the request in detail: [employer] ([phone]) is requesting a RX for the patient medication and a prior authorization.       Can the clinic reply by [facility]: no      What Number to [facility] Back [facility]: [phone]

## 2018-05-02 DIAGNOSIS — I10 ESSENTIAL HYPERTENSION: ICD-10-CM

## 2018-05-02 DIAGNOSIS — M15.9 PRIMARY OSTEOARTHRITIS INVOLVING MULTIPLE JOINTS: ICD-10-CM

## 2018-05-02 DIAGNOSIS — K21.9 GASTROESOPHAGEAL REFLUX DISEASE, ESOPHAGITIS PRESENCE NOT SPECIFIED: ICD-10-CM

## 2018-05-02 DIAGNOSIS — F43.21 ADJUSTMENT DISORDER WITH DEPRESSED MOOD: ICD-10-CM

## 2018-05-02 RX ORDER — LOSARTAN POTASSIUM 50 MG/1
50 TABLET ORAL DAILY
Qty: 90 TABLET | Refills: 2 | OUTPATIENT
Start: 2018-05-02

## 2018-05-02 RX ORDER — AMLODIPINE BESYLATE 10 MG/1
TABLET ORAL
Qty: 90 TABLET | Refills: 2 | OUTPATIENT
Start: 2018-05-02

## 2018-05-02 RX ORDER — PAROXETINE HYDROCHLORIDE 40 MG/1
40 TABLET, FILM COATED ORAL DAILY
Qty: 90 TABLET | Refills: 2 | Status: SHIPPED | OUTPATIENT
Start: 2018-05-02 | End: 2018-05-10 | Stop reason: SDUPTHER

## 2018-05-02 RX ORDER — OMEPRAZOLE 20 MG/1
20 CAPSULE, DELAYED RELEASE ORAL DAILY
Qty: 90 CAPSULE | Refills: 2 | Status: SHIPPED | OUTPATIENT
Start: 2018-05-02 | End: 2018-05-10 | Stop reason: SDUPTHER

## 2018-05-02 RX ORDER — PAROXETINE HYDROCHLORIDE 40 MG/1
TABLET, FILM COATED ORAL
Qty: 90 TABLET | Refills: 2 | OUTPATIENT
Start: 2018-05-02

## 2018-05-02 RX ORDER — AMLODIPINE BESYLATE 10 MG/1
10 TABLET ORAL DAILY
Qty: 90 TABLET | Refills: 2 | Status: SHIPPED | OUTPATIENT
Start: 2018-05-02 | End: 2018-05-10 | Stop reason: SDUPTHER

## 2018-05-02 RX ORDER — MELOXICAM 15 MG/1
15 TABLET ORAL DAILY
Qty: 90 TABLET | Refills: 2 | Status: SHIPPED | OUTPATIENT
Start: 2018-05-02 | End: 2018-05-10 | Stop reason: SDUPTHER

## 2018-05-02 RX ORDER — LOSARTAN POTASSIUM 50 MG/1
50 TABLET ORAL DAILY
Qty: 90 TABLET | Refills: 2 | Status: SHIPPED | OUTPATIENT
Start: 2018-05-02 | End: 2018-05-10 | Stop reason: SDUPTHER

## 2018-05-08 ENCOUNTER — TELEPHONE (OUTPATIENT)
Dept: OPHTHALMOLOGY | Facility: CLINIC | Age: 68
End: 2018-05-08

## 2018-05-10 ENCOUNTER — NURSE TRIAGE (OUTPATIENT)
Dept: ADMINISTRATIVE | Facility: CLINIC | Age: 68
End: 2018-05-10

## 2018-05-10 DIAGNOSIS — K21.9 GASTROESOPHAGEAL REFLUX DISEASE, ESOPHAGITIS PRESENCE NOT SPECIFIED: ICD-10-CM

## 2018-05-10 DIAGNOSIS — M15.9 PRIMARY OSTEOARTHRITIS INVOLVING MULTIPLE JOINTS: ICD-10-CM

## 2018-05-10 DIAGNOSIS — F43.21 ADJUSTMENT DISORDER WITH DEPRESSED MOOD: ICD-10-CM

## 2018-05-10 DIAGNOSIS — I10 ESSENTIAL HYPERTENSION: ICD-10-CM

## 2018-05-10 RX ORDER — LOSARTAN POTASSIUM 50 MG/1
50 TABLET ORAL DAILY
Qty: 30 TABLET | Refills: 2 | Status: SHIPPED | OUTPATIENT
Start: 2018-05-10 | End: 2018-05-11 | Stop reason: SDUPTHER

## 2018-05-10 RX ORDER — PAROXETINE HYDROCHLORIDE 40 MG/1
40 TABLET, FILM COATED ORAL DAILY
Qty: 30 TABLET | Refills: 2 | Status: SHIPPED | OUTPATIENT
Start: 2018-05-10 | End: 2018-05-11 | Stop reason: SDUPTHER

## 2018-05-10 RX ORDER — AMLODIPINE BESYLATE 10 MG/1
10 TABLET ORAL DAILY
Qty: 30 TABLET | Refills: 2 | Status: SHIPPED | OUTPATIENT
Start: 2018-05-10 | End: 2018-05-11 | Stop reason: SDUPTHER

## 2018-05-10 RX ORDER — OMEPRAZOLE 20 MG/1
20 CAPSULE, DELAYED RELEASE ORAL DAILY
Qty: 30 CAPSULE | Refills: 2 | Status: SHIPPED | OUTPATIENT
Start: 2018-05-10 | End: 2018-07-18 | Stop reason: SDUPTHER

## 2018-05-10 RX ORDER — MELOXICAM 15 MG/1
15 TABLET ORAL DAILY
Qty: 30 TABLET | Refills: 2 | Status: SHIPPED | OUTPATIENT
Start: 2018-05-10 | End: 2018-05-11 | Stop reason: SDUPTHER

## 2018-05-11 ENCOUNTER — TELEPHONE (OUTPATIENT)
Dept: INTERNAL MEDICINE | Facility: CLINIC | Age: 68
End: 2018-05-11

## 2018-05-11 DIAGNOSIS — F43.21 ADJUSTMENT DISORDER WITH DEPRESSED MOOD: ICD-10-CM

## 2018-05-11 DIAGNOSIS — M15.9 PRIMARY OSTEOARTHRITIS INVOLVING MULTIPLE JOINTS: ICD-10-CM

## 2018-05-11 DIAGNOSIS — I10 ESSENTIAL HYPERTENSION: ICD-10-CM

## 2018-05-11 RX ORDER — PAROXETINE HYDROCHLORIDE 40 MG/1
40 TABLET, FILM COATED ORAL DAILY
Qty: 90 TABLET | Refills: 3 | Status: SHIPPED | OUTPATIENT
Start: 2018-05-11 | End: 2018-05-31 | Stop reason: SDUPTHER

## 2018-05-11 RX ORDER — LOSARTAN POTASSIUM 50 MG/1
50 TABLET ORAL DAILY
Qty: 90 TABLET | Refills: 3 | Status: SHIPPED | OUTPATIENT
Start: 2018-05-11 | End: 2018-10-04 | Stop reason: DRUGHIGH

## 2018-05-11 RX ORDER — AMLODIPINE BESYLATE 10 MG/1
10 TABLET ORAL DAILY
Qty: 90 TABLET | Refills: 3 | Status: SHIPPED | OUTPATIENT
Start: 2018-05-11 | End: 2018-05-22 | Stop reason: SDUPTHER

## 2018-05-11 RX ORDER — MELOXICAM 15 MG/1
15 TABLET ORAL DAILY
Qty: 90 TABLET | Refills: 3 | Status: SHIPPED | OUTPATIENT
Start: 2018-05-11 | End: 2018-10-04 | Stop reason: SDUPTHER

## 2018-05-11 NOTE — TELEPHONE ENCOUNTER
----- Message from Shane Combs sent at 5/10/2018  2:03 PM CDT -----  Contact: Patient [phone]  Is this a refill or new RX:  Refill    RX name and strength: amLODIPine (NORVASC) 10 MG tablet, losartan (COZAAR) 50 MG tablet, meloxicam (MOBIC) 15 MG tablet, and paroxetine (PAXIL) 40 MG tablet     Pharmacy name and phone # [facility] - [place], [place] - [address] AT Portal to [facility] Sites [phone] (Phone)  [phone] (Fax)    Comments: She talked to [facility] this morning and they said they didn't have the refill requests.

## 2018-05-11 NOTE — TELEPHONE ENCOUNTER
Reason for Disposition   [1] Prescription not at pharmacy AND [2] was prescribed today by PCP    Protocols used: ST MEDICATION QUESTION CALL-A-    Pt calling regarding medications that were called into pharmacy, but [facility] states medications were never sent.  Noted in Pt's medication, Dr. Sorensen did send medication.  Medications phoned in @ Pt's preferred [facility].  Pt called and notified.

## 2018-05-22 ENCOUNTER — TELEPHONE (OUTPATIENT)
Dept: INTERNAL MEDICINE | Facility: CLINIC | Age: 68
End: 2018-05-22

## 2018-05-22 DIAGNOSIS — I10 ESSENTIAL HYPERTENSION: ICD-10-CM

## 2018-05-22 RX ORDER — AMLODIPINE BESYLATE 10 MG/1
10 TABLET ORAL DAILY
Qty: 90 TABLET | Refills: 3 | Status: SHIPPED | OUTPATIENT
Start: 2018-05-22 | End: 2018-10-04 | Stop reason: SDUPTHER

## 2018-05-22 NOTE — TELEPHONE ENCOUNTER
----- Message from Shane Combs sent at 5/21/2018  1:38 PM CDT -----  Contact: Patient [phone]  Is this a refill or new RX:  Refill    RX name and strength: amLODIPine (NORVASC) 10 MG tablet    Pharmacy name and phone #: [facility] - [place], [place] - [address] AT Portal to [facility] Sites [phone] (Phone)  [phone] (Fax)

## 2018-05-24 ENCOUNTER — PATIENT MESSAGE (OUTPATIENT)
Dept: INTERNAL MEDICINE | Facility: CLINIC | Age: 68
End: 2018-05-24

## 2018-05-24 ENCOUNTER — TELEPHONE (OUTPATIENT)
Dept: DERMATOLOGY | Facility: CLINIC | Age: 68
End: 2018-05-24

## 2018-05-25 ENCOUNTER — TELEPHONE (OUTPATIENT)
Dept: DERMATOLOGY | Facility: CLINIC | Age: 68
End: 2018-05-25

## 2018-05-31 DIAGNOSIS — F43.21 ADJUSTMENT DISORDER WITH DEPRESSED MOOD: ICD-10-CM

## 2018-05-31 RX ORDER — PAROXETINE HYDROCHLORIDE 40 MG/1
40 TABLET, FILM COATED ORAL DAILY
Qty: 90 TABLET | Refills: 3 | Status: SHIPPED | OUTPATIENT
Start: 2018-05-31 | End: 2019-04-09 | Stop reason: SDUPTHER

## 2018-05-31 NOTE — TELEPHONE ENCOUNTER
"----- Message from Anastasia Arce sent at 5/31/2018  4:42 PM CDT -----  Contact: [facility] [phone] ref#[id number]  RX request - refill or new RX.  Is this a refill or new RX:  Refill  RX name and strength: paroxetine (PAXIL) 40 MG tablet  Directions:   Is this a 30 day or 90 day RX:    Local pharmacy or mail order pharmacy:  Mail Order  Pharmacy name and phone # (DON'T enter "on file" or "in chart"): [facility] - [place], [place] - [address] AT Portal to [facility] Sites [phone] (Phone)  [phone] (Fax)      Comments:        "

## 2018-06-06 ENCOUNTER — PATIENT MESSAGE (OUTPATIENT)
Dept: INTERNAL MEDICINE | Facility: CLINIC | Age: 68
End: 2018-06-06

## 2018-06-07 ENCOUNTER — OFFICE VISIT (OUTPATIENT)
Dept: OBSTETRICS AND GYNECOLOGY | Facility: CLINIC | Age: 68
End: 2018-06-07
Attending: OBSTETRICS & GYNECOLOGY
Payer: MEDICARE

## 2018-06-07 VITALS
SYSTOLIC BLOOD PRESSURE: 120 MMHG | BODY MASS INDEX: 27.61 KG/M2 | DIASTOLIC BLOOD PRESSURE: 78 MMHG | WEIGHT: 165.69 LBS | HEIGHT: 65 IN

## 2018-06-07 DIAGNOSIS — N95.1 MENOPAUSAL SYMPTOMS: ICD-10-CM

## 2018-06-07 DIAGNOSIS — Z12.4 PAP SMEAR FOR CERVICAL CANCER SCREENING: ICD-10-CM

## 2018-06-07 DIAGNOSIS — Z01.419 WELL WOMAN EXAM WITH ROUTINE GYNECOLOGICAL EXAM: Primary | ICD-10-CM

## 2018-06-07 PROCEDURE — 3074F SYST BP LT 130 MM HG: CPT | Mod: CPTII,S$GLB,, | Performed by: OBSTETRICS & GYNECOLOGY

## 2018-06-07 PROCEDURE — 88175 CYTOPATH C/V AUTO FLUID REDO: CPT

## 2018-06-07 PROCEDURE — G0101 CA SCREEN;PELVIC/BREAST EXAM: HCPCS | Mod: S$GLB,,, | Performed by: OBSTETRICS & GYNECOLOGY

## 2018-06-07 PROCEDURE — 3078F DIAST BP <80 MM HG: CPT | Mod: CPTII,S$GLB,, | Performed by: OBSTETRICS & GYNECOLOGY

## 2018-06-07 RX ORDER — ESTRADIOL 1 MG/1
1 TABLET ORAL DAILY
Qty: 90 TABLET | Refills: 3 | Status: SHIPPED | OUTPATIENT
Start: 2018-06-07 | End: 2018-10-04 | Stop reason: SDUPTHER

## 2018-06-07 NOTE — PROGRESS NOTES
Subjective:       Patient ID: Nicole Jacques is a 68 y.o. female.    Chief Complaint:  Follow-up      History of Present Illness  HPI  This pt is here for HRT follow up .  Last saw her in 2016.  She is taking estrace 1mg daily and needs refil  She had hyst but still has her ovaries.  She was on oxybutin but dried up everything so she stopped.  She has no complaints  GYN & OB History  No LMP recorded. Patient has had a hysterectomy.   Date of Last Pap: No result found    OB History    Para Term  AB Living   2 2 2         SAB TAB Ectopic Multiple Live Births                  # Outcome Date GA Lbr Lui/2nd Weight Sex Delivery Anes PTL Lv   2 Term            1 Term                   Review of Systems  Review of Systems   Constitutional: Negative for chills and fever.   Respiratory: Negative for shortness of breath.    Cardiovascular: Negative for chest pain.   Gastrointestinal: Negative for abdominal pain, nausea and vomiting.   Genitourinary: Negative for difficulty urinating, dyspareunia, genital sores, menstrual problem, pelvic pain, vaginal bleeding, vaginal discharge and vaginal pain.   Skin: Negative for wound.   Hematological: Negative for adenopathy.           Objective:    Physical Exam:   Constitutional: She is oriented to person, place, and time. She appears well-developed and well-nourished.    HENT:   Head: Normocephalic.    Eyes: EOM are normal.    Neck: Normal range of motion.    Cardiovascular: Normal rate.     Pulmonary/Chest: Effort normal. She exhibits no mass and no tenderness. Right breast exhibits no inverted nipple, no mass, no skin change and no tenderness. Left breast exhibits no inverted nipple, no mass, no skin change and no tenderness.        Abdominal: Soft. She exhibits no distension. There is no tenderness.     Genitourinary: Vagina normal. There is no rash, tenderness or lesion on the right labia. There is no rash, tenderness or lesion on the left labia. Uterus is absent.  Uterus is not tender. Cervix is normal. Right adnexum displays no mass, no tenderness and no fullness. Left adnexum displays no mass, no tenderness and no fullness. Cervix exhibits no discharge.           Musculoskeletal: Normal range of motion.       Neurological: She is alert and oriented to person, place, and time.    Skin: Skin is warm and dry.    Psychiatric: She has a normal mood and affect.          Assessment:        1. Well woman exam with routine gynecological exam    2. Menopausal symptoms    3. Pap smear for cervical cancer screening               Plan:       continue estrace 1mg daily  Yearly mammogram  Will not technically need another pap

## 2018-06-07 NOTE — TELEPHONE ENCOUNTER
I don't believe we need to issue an order for the Shingrix vaccine, please call the pharmacy to confirm.

## 2018-07-03 ENCOUNTER — OFFICE VISIT (OUTPATIENT)
Dept: DERMATOLOGY | Facility: CLINIC | Age: 68
End: 2018-07-03
Payer: MEDICARE

## 2018-07-03 DIAGNOSIS — L57.0 ACTINIC KERATOSIS: Primary | ICD-10-CM

## 2018-07-03 PROCEDURE — 99499 UNLISTED E&M SERVICE: CPT | Mod: S$GLB,,, | Performed by: DERMATOLOGY

## 2018-07-03 PROCEDURE — 17000 DESTRUCT PREMALG LESION: CPT | Mod: S$GLB,,, | Performed by: DERMATOLOGY

## 2018-07-03 NOTE — PROGRESS NOTES
Subjective:       Patient ID:  Nicole Jacques is a 68 y.o. female who presents for   Chief Complaint   Patient presents with    Spot     L cheek     Spot  - Follow-up  Diagnosis: pigmented actinic keratosis.  Symptom course: unchanged  Currently using: no treatment.  Affected locations: left cheek  Signs / symptoms: asymptomatic  Severity: mild      Review of Systems   Skin: Negative for daily sunscreen use, tendency to form keloidal scars and recent sunburn.   Hematologic/Lymphatic: Bruises/bleeds easily (due to aging).        Objective:    Physical Exam   Constitutional: She appears well-developed and well-nourished. No distress.   Neurological: She is alert and oriented to person, place, and time. She is not disoriented.   Psychiatric: She has a normal mood and affect.   Skin:   Areas Examined (abnormalities noted in diagram):   Head / Face Inspection Performed              Diagram Legend     Erythematous scaling macule/papule c/w actinic keratosis       Vascular papule c/w angioma      Pigmented verrucoid papule/plaque c/w seborrheic keratosis      Yellow umbilicated papule c/w sebaceous hyperplasia      Irregularly shaped tan macule c/w lentigo     1-2 mm smooth white papules consistent with Milia      Movable subcutaneous cyst with punctum c/w epidermal inclusion cyst      Subcutaneous movable cyst c/w pilar cyst      Firm pink to brown papule c/w dermatofibroma      Pedunculated fleshy papule(s) c/w skin tag(s)      Evenly pigmented macule c/w junctional nevus     Mildly variegated pigmented, slightly irregular-bordered macule c/w mildly atypical nevus      Flesh colored to evenly pigmented papule c/w intradermal nevus       Pink pearly papule/plaque c/w basal cell carcinoma      Erythematous hyperkeratotic cursted plaque c/w SCC      Surgical scar with no sign of skin cancer recurrence      Open and closed comedones      Inflammatory papules and pustules      Verrucoid papule consistent consistent with  wart     Erythematous eczematous patches and plaques     Dystrophic onycholytic nail with subungual debris c/w onychomycosis     Umbilicated papule    Erythematous-base heme-crusted tan verrucoid plaque consistent with inflamed seborrheic keratosis     Erythematous Silvery Scaling Plaque c/w Psoriasis     See annotation      Assessment / Plan:        Actinic keratosis  Cryosurgery procedure note:  Risk, benefits, and alternatives of cryosurgery vs. topical chemotherapy were discussed with the patient, including risk of hypo- or hyperpigmentation, scar, infection, recurrence, and need for additional treatment of site. Verbal consent for cryosurgery was obtained from patient. Liquid nitrogen cryosurgery applied to 1 lesion(s) to produce a freeze injury. Counseled patient that blisters may form and instructed patient on wound care with gentle cleansing and use of Vaseline ointment to keep moist until healed. Handout was provided, and patient was instructed to return to clinic in 1-2 months if lesions do not completely resolve.      Follow-up in about 4 weeks (around 7/31/2018).

## 2018-07-05 ENCOUNTER — TELEPHONE (OUTPATIENT)
Dept: INTERNAL MEDICINE | Facility: CLINIC | Age: 68
End: 2018-07-05

## 2018-07-05 DIAGNOSIS — R73.03 PRE-DIABETES: Primary | ICD-10-CM

## 2018-07-05 NOTE — TELEPHONE ENCOUNTER
----- Message from Radha March sent at 7/5/2018  8:55 AM CDT -----  Contact: [facility]/[phone]/ [phone]/fax  Member is requesting diabetic testing supplies, stripts and lancets - true metrix   Medical necessities, notes. [employer] [facility] [place] [id number] [phone] (Phone) [phone] (Fax). Please call and advise. Thank you.

## 2018-07-05 NOTE — TELEPHONE ENCOUNTER
She has Pre-Diabetes with HbA1c 5.6% on NO meds, I have not prescribed home glucose monitoring. Her annual is due in September, labs will be rechecked at that time. If she has symptoms, office visit recommended.

## 2018-07-18 DIAGNOSIS — K21.9 GASTROESOPHAGEAL REFLUX DISEASE, ESOPHAGITIS PRESENCE NOT SPECIFIED: ICD-10-CM

## 2018-07-18 RX ORDER — OMEPRAZOLE 20 MG/1
20 CAPSULE, DELAYED RELEASE ORAL DAILY
Qty: 90 CAPSULE | Refills: 0 | Status: SHIPPED | OUTPATIENT
Start: 2018-07-18 | End: 2018-10-04 | Stop reason: SDUPTHER

## 2018-07-18 NOTE — TELEPHONE ENCOUNTER
----- Message from Sahne Combs sent at 7/18/2018  2:44 PM CDT -----  Contact: Patient [phone]  Is this a refill or new RX:  Refill    RX name and strength:omeprazole (PRILOSEC) 20 MG capsule       Pharmacy name and phone # [facility] - [place], [place] - [address] AT Portal to [facility] Sites [phone] (Phone)  [phone] (Fax)    Comments: She said he pharmacist told her that you denied the refill.

## 2018-09-01 ENCOUNTER — PATIENT MESSAGE (OUTPATIENT)
Dept: INTERNAL MEDICINE | Facility: CLINIC | Age: 68
End: 2018-09-01

## 2018-09-11 ENCOUNTER — TELEPHONE (OUTPATIENT)
Dept: OBSTETRICS AND GYNECOLOGY | Facility: CLINIC | Age: 68
End: 2018-09-11

## 2018-09-11 NOTE — TELEPHONE ENCOUNTER
----- Message from Elvira Smiley sent at 9/11/2018 12:38 PM CDT -----  Contact: Pt  Name of Who is Calling: OSIEL HOPE [[id number]]    What is the request in detail: Patient is requesting to speak with the staff to schedule an annual check up.......Please contact to further discuss and advise     Can the clinic reply by [facility]: No    What Number to Call Back if not in [facility]: [phone]

## 2018-09-11 NOTE — TELEPHONE ENCOUNTER
----- Message from Elvira Smiley sent at 9/11/2018 12:38 PM CDT -----  Contact: Pt  Name of Who is Calling: OSIEL HOPE [[id number]]    What is the request in detail: Patient is requesting to speak with the staff to schedule an annual check up.......Please contact to further discuss and advise     Can the clinic reply by [facility]: No    What Number to Call Back if not in [facility]: [phone]

## 2018-09-11 NOTE — TELEPHONE ENCOUNTER
Spoke with patient and informed her that she already had a wellness exam in June. Patient verbalized understanding all information

## 2018-10-04 ENCOUNTER — OFFICE VISIT (OUTPATIENT)
Dept: INTERNAL MEDICINE | Facility: CLINIC | Age: 68
End: 2018-10-04
Payer: MEDICARE

## 2018-10-04 ENCOUNTER — IMMUNIZATION (OUTPATIENT)
Dept: INTERNAL MEDICINE | Facility: CLINIC | Age: 68
End: 2018-10-04
Payer: MEDICARE

## 2018-10-04 ENCOUNTER — LAB VISIT (OUTPATIENT)
Dept: LAB | Facility: HOSPITAL | Age: 68
End: 2018-10-04
Attending: INTERNAL MEDICINE
Payer: MEDICARE

## 2018-10-04 VITALS
BODY MASS INDEX: 26.44 KG/M2 | HEART RATE: 71 BPM | DIASTOLIC BLOOD PRESSURE: 90 MMHG | SYSTOLIC BLOOD PRESSURE: 130 MMHG | HEIGHT: 65 IN | WEIGHT: 158.69 LBS

## 2018-10-04 DIAGNOSIS — F41.1 GAD (GENERALIZED ANXIETY DISORDER): ICD-10-CM

## 2018-10-04 DIAGNOSIS — Z23 INFLUENZA VACCINE NEEDED: ICD-10-CM

## 2018-10-04 DIAGNOSIS — R73.03 PRE-DIABETES: ICD-10-CM

## 2018-10-04 DIAGNOSIS — I10 ESSENTIAL HYPERTENSION: ICD-10-CM

## 2018-10-04 DIAGNOSIS — N95.1 MENOPAUSAL SYMPTOMS: ICD-10-CM

## 2018-10-04 DIAGNOSIS — K21.9 GASTROESOPHAGEAL REFLUX DISEASE, ESOPHAGITIS PRESENCE NOT SPECIFIED: ICD-10-CM

## 2018-10-04 DIAGNOSIS — Z12.31 ENCOUNTER FOR SCREENING MAMMOGRAM FOR BREAST CANCER: ICD-10-CM

## 2018-10-04 DIAGNOSIS — F43.21 ADJUSTMENT DISORDER WITH DEPRESSED MOOD: ICD-10-CM

## 2018-10-04 DIAGNOSIS — M15.9 PRIMARY OSTEOARTHRITIS INVOLVING MULTIPLE JOINTS: ICD-10-CM

## 2018-10-04 DIAGNOSIS — E55.9 VITAMIN D DEFICIENCY: ICD-10-CM

## 2018-10-04 DIAGNOSIS — I10 ESSENTIAL HYPERTENSION: Primary | ICD-10-CM

## 2018-10-04 LAB
25(OH)D3+25(OH)D2 SERPL-MCNC: 43 NG/ML
ALBUMIN SERPL BCP-MCNC: 4.1 G/DL
ALP SERPL-CCNC: 47 U/L
ALT SERPL W/O P-5'-P-CCNC: 15 U/L
AMORPH CRY UR QL COMP ASSIST: NORMAL
ANION GAP SERPL CALC-SCNC: 8 MMOL/L
AST SERPL-CCNC: 22 U/L
BASOPHILS # BLD AUTO: 0.01 K/UL
BASOPHILS NFR BLD: 0.2 %
BILIRUB SERPL-MCNC: 0.4 MG/DL
BILIRUB UR QL STRIP: NEGATIVE
BUN SERPL-MCNC: 22 MG/DL
CALCIUM SERPL-MCNC: 9.5 MG/DL
CHLORIDE SERPL-SCNC: 106 MMOL/L
CHOLEST SERPL-MCNC: 196 MG/DL
CHOLEST/HDLC SERPL: 2.8 {RATIO}
CLARITY UR REFRACT.AUTO: ABNORMAL
CO2 SERPL-SCNC: 25 MMOL/L
COLOR UR AUTO: YELLOW
CREAT SERPL-MCNC: 0.9 MG/DL
DIFFERENTIAL METHOD: NORMAL
EOSINOPHIL # BLD AUTO: 0.1 K/UL
EOSINOPHIL NFR BLD: 1.9 %
ERYTHROCYTE [DISTWIDTH] IN BLOOD BY AUTOMATED COUNT: 13 %
EST. GFR  (AFRICAN AMERICAN): >60 ML/MIN/1.73 M^2
EST. GFR  (NON AFRICAN AMERICAN): >60 ML/MIN/1.73 M^2
ESTIMATED AVG GLUCOSE: 117 MG/DL
GLUCOSE SERPL-MCNC: 98 MG/DL
GLUCOSE UR QL STRIP: NEGATIVE
HBA1C MFR BLD HPLC: 5.7 %
HCT VFR BLD AUTO: 38.7 %
HDLC SERPL-MCNC: 69 MG/DL
HDLC SERPL: 35.2 %
HGB BLD-MCNC: 13.2 G/DL
HGB UR QL STRIP: NEGATIVE
KETONES UR QL STRIP: NEGATIVE
LDLC SERPL CALC-MCNC: 107.2 MG/DL
LEUKOCYTE ESTERASE UR QL STRIP: NEGATIVE
LYMPHOCYTES # BLD AUTO: 1 K/UL
LYMPHOCYTES NFR BLD: 20.6 %
MCH RBC QN AUTO: 30.4 PG
MCHC RBC AUTO-ENTMCNC: 34.1 G/DL
MCV RBC AUTO: 89 FL
MICROSCOPIC COMMENT: NORMAL
MONOCYTES # BLD AUTO: 0.3 K/UL
MONOCYTES NFR BLD: 6 %
NEUTROPHILS # BLD AUTO: 3.4 K/UL
NEUTROPHILS NFR BLD: 70.5 %
NITRITE UR QL STRIP: NEGATIVE
NONHDLC SERPL-MCNC: 127 MG/DL
PH UR STRIP: 5 [PH] (ref 5–8)
PLATELET # BLD AUTO: 202 K/UL
PMV BLD AUTO: 11.9 FL
POTASSIUM SERPL-SCNC: 4.1 MMOL/L
PROT SERPL-MCNC: 8.1 G/DL
PROT UR QL STRIP: NEGATIVE
RBC # BLD AUTO: 4.34 M/UL
RBC #/AREA URNS AUTO: 0 /HPF (ref 0–4)
SODIUM SERPL-SCNC: 139 MMOL/L
SP GR UR STRIP: 1.03 (ref 1–1.03)
SQUAMOUS #/AREA URNS AUTO: 3 /HPF
TRIGL SERPL-MCNC: 99 MG/DL
URN SPEC COLLECT METH UR: ABNORMAL
UROBILINOGEN UR STRIP-ACNC: NEGATIVE EU/DL
WBC # BLD AUTO: 4.85 K/UL
WBC #/AREA URNS AUTO: 1 /HPF (ref 0–5)

## 2018-10-04 PROCEDURE — 99215 OFFICE O/P EST HI 40 MIN: CPT | Mod: S$PBB,,, | Performed by: INTERNAL MEDICINE

## 2018-10-04 PROCEDURE — 82306 VITAMIN D 25 HYDROXY: CPT

## 2018-10-04 PROCEDURE — 36415 COLL VENOUS BLD VENIPUNCTURE: CPT

## 2018-10-04 PROCEDURE — 99213 OFFICE O/P EST LOW 20 MIN: CPT | Mod: PBBFAC | Performed by: INTERNAL MEDICINE

## 2018-10-04 PROCEDURE — 81001 URINALYSIS AUTO W/SCOPE: CPT

## 2018-10-04 PROCEDURE — 80061 LIPID PANEL: CPT

## 2018-10-04 PROCEDURE — 99999 PR PBB SHADOW E&M-EST. PATIENT-LVL III: CPT | Mod: PBBFAC,,, | Performed by: INTERNAL MEDICINE

## 2018-10-04 PROCEDURE — 85025 COMPLETE CBC W/AUTO DIFF WBC: CPT

## 2018-10-04 PROCEDURE — 3075F SYST BP GE 130 - 139MM HG: CPT | Mod: CPTII,,, | Performed by: INTERNAL MEDICINE

## 2018-10-04 PROCEDURE — 3080F DIAST BP >= 90 MM HG: CPT | Mod: CPTII,,, | Performed by: INTERNAL MEDICINE

## 2018-10-04 PROCEDURE — 99499 UNLISTED E&M SERVICE: CPT | Mod: S$GLB,,, | Performed by: INTERNAL MEDICINE

## 2018-10-04 PROCEDURE — 90662 IIV NO PRSV INCREASED AG IM: CPT | Mod: PBBFAC

## 2018-10-04 PROCEDURE — 80053 COMPREHEN METABOLIC PANEL: CPT

## 2018-10-04 PROCEDURE — 83036 HEMOGLOBIN GLYCOSYLATED A1C: CPT

## 2018-10-04 PROCEDURE — 1101F PT FALLS ASSESS-DOCD LE1/YR: CPT | Mod: CPTII,,, | Performed by: INTERNAL MEDICINE

## 2018-10-04 RX ORDER — ESTRADIOL 1 MG/1
1 TABLET ORAL DAILY
Qty: 90 TABLET | Refills: 3 | Status: SHIPPED | OUTPATIENT
Start: 2018-10-04 | End: 2019-10-10 | Stop reason: SDUPTHER

## 2018-10-04 RX ORDER — AMLODIPINE BESYLATE 10 MG/1
10 TABLET ORAL DAILY
Qty: 90 TABLET | Refills: 3 | Status: SHIPPED | OUTPATIENT
Start: 2018-10-04 | End: 2019-10-10 | Stop reason: SDUPTHER

## 2018-10-04 RX ORDER — OMEPRAZOLE 20 MG/1
20 CAPSULE, DELAYED RELEASE ORAL DAILY
Qty: 90 CAPSULE | Refills: 3 | Status: SHIPPED | OUTPATIENT
Start: 2018-10-04 | End: 2019-08-12 | Stop reason: SDUPTHER

## 2018-10-04 RX ORDER — ALPRAZOLAM 2 MG/1
2 TABLET ORAL 2 TIMES DAILY PRN
Qty: 60 TABLET | Refills: 5 | Status: SHIPPED | OUTPATIENT
Start: 2018-10-04 | End: 2019-04-09 | Stop reason: SDUPTHER

## 2018-10-04 RX ORDER — LOSARTAN POTASSIUM 100 MG/1
100 TABLET ORAL DAILY
Qty: 90 TABLET | Refills: 1 | Status: SHIPPED | OUTPATIENT
Start: 2018-10-04 | End: 2019-04-09 | Stop reason: SDUPTHER

## 2018-10-04 RX ORDER — MELOXICAM 15 MG/1
15 TABLET ORAL DAILY PRN
Qty: 90 TABLET | Refills: 3 | Status: SHIPPED | OUTPATIENT
Start: 2018-10-04 | End: 2019-10-10 | Stop reason: SDUPTHER

## 2018-10-04 NOTE — PROGRESS NOTES
Subjective:       Patient ID: Nicole Jacques is a 68 y.o. female.    Chief Complaint: Hypertension    Last seen 6 months ago. Returns for annual exam and f/u chronic medical conditions. No acute complaints, feeling well. Acid reflux is controlled on Omeprazole, but she has symptoms if she gets off it. Mild depression is controlled on Paxil. Anxiety stable on Xanax. Home BP ranges 130's/80-90's per history, she is concerned this is not well controlled. Has a healthy diet and exercises regularly, lost some weight.    PMH: , misc x 1.    Hypertension.  Pre-Diabetes, HbA1c up to 6.3%.  GERD, EGD  only showed hiatal hernia.  Depression with Anxiety.    Lumbar Spondylosis.  Mild OA Knees.   Sigmoid Diverticulosis.     Colonoscopy 11/15 normal. Mammogram normal . Pelvic exam 10/16. BMD normal 12/15. Eye exam . Flu shot  at [facility]. EKG normal . Pneumovax . Prevnar 8/15. Has had Zostavax. Labs : CBC normal, CMP normal, HbA1c 5.6%, TSH 1.155, Vit D 51, TChol 199, TG 81, HDL 75, .    SOCIAL: No tobacco,  smokes. Social alcohol. No illicit drug usage.  with 2 adult children who live locally. Retired  at [employer] and [employer].     PSH: BTL, Hysterectomy due to heavy menses, ovaries remain. Left knee arthroscopic surgery due to injury kick boxing. Appendectomy. Bilateral Cataract extractions.     FMH: Father  age 81 with bladder cancer and dementia. Mother  age 78 with hypertension and renal failure, and CHF, possible osteoporosis. Diabetes in grandmothers. Anxiety - mother, sister. Colon cancer in maternal uncle.    Allergies: Bactrim.    Medications: list reviewed and reconciled. Uses Meloxicam every day for back and knee pain. And uses Omeprazole every day.               Review of Systems   Constitutional: Negative for activity change, appetite change, fatigue, fever and unexpected weight change.        Tries to keep a lean diet, avoids simple  "carbohydrates, and exercises regularly.    HENT: Negative for congestion, hearing loss, rhinorrhea, sneezing, sore throat, trouble swallowing and voice change.    Eyes: Negative for pain and visual disturbance.   Respiratory: Negative for cough, chest tightness, shortness of breath and wheezing.    Cardiovascular: Negative for chest pain, palpitations and leg swelling.   Gastrointestinal: Negative for abdominal pain, blood in stool, constipation, diarrhea, nausea and vomiting.   Genitourinary: Negative for difficulty urinating, dysuria, flank pain, frequency, hematuria and urgency.   Musculoskeletal: Negative for arthralgias, back pain, joint swelling, myalgias and neck pain.   Skin: Negative for color change and rash.   Neurological: Negative for dizziness, syncope, facial asymmetry, speech difficulty, weakness, numbness and headaches.   Hematological: Negative for adenopathy. Does not bruise/bleed easily.   Psychiatric/Behavioral: Negative for agitation, dysphoric mood and sleep disturbance. The patient is nervous/anxious.        Objective:    /90, repeat 132/83, Pulse 71, Ht 5' 5", Wt 158.7 lbs (from 163), BMI=26.4  Physical Exam   Constitutional: She is oriented to person, place, and time. She appears well-developed and well-nourished. No distress.   HENT:   Head: Normocephalic and atraumatic.   Right Ear: External ear normal.   Left Ear: External ear normal.   Nose: Nose normal.   Mouth/Throat: Oropharynx is clear and moist. No oropharyngeal exudate.   Eyes: Conjunctivae and EOM are normal. Pupils are equal, round, and reactive to light. Right conjunctiva is not injected. Left conjunctiva is not injected. No scleral icterus.   Neck: Normal range of motion. Neck supple. No JVD present. Carotid bruit is not present. No thyromegaly present.   Cardiovascular: Normal rate, regular rhythm, normal heart sounds and intact distal pulses. Exam reveals no gallop and no friction rub.   No murmur " heard.  Pulmonary/Chest: Effort normal and breath sounds normal. No respiratory distress. She has no wheezes. She has no rhonchi. She has no rales.   Abdominal: Soft. Bowel sounds are normal. She exhibits no distension and no mass. There is no hepatosplenomegaly. There is no tenderness. There is no CVA tenderness.   Musculoskeletal: Normal range of motion. She exhibits no edema, tenderness or deformity.   Lymphadenopathy:     She has no cervical adenopathy.   Neurological: She is alert and oriented to person, place, and time. She has normal strength and normal reflexes. No cranial nerve deficit. She exhibits normal muscle tone. Coordination and gait normal.   Skin: Skin is warm and dry. No lesion and no rash noted. She is not diaphoretic. No cyanosis or erythema. No pallor. Nails show no clubbing.   Psychiatric: She has a normal mood and affect. Her behavior is normal. Judgment and thought content normal.   Vitals reviewed.      Assessment:       1. Essential hypertension    2. Pre-diabetes    3. Vitamin D deficiency    4. Primary osteoarthritis involving multiple joints    5. Gastroesophageal reflux disease, esophagitis presence not specified    6. Menopausal symptoms    7. Adjustment disorder with depressed mood    8. ROSALIA (generalized anxiety disorder)    9. Encounter for screening mammogram for breast cancer    10. Influenza vaccine needed        Plan:       Essential hypertension - not quite to goal  -     CBC auto differential; Future; Expected date: 10/04/2018  -     Comprehensive metabolic panel; Future; Expected date: 10/04/2018  -     Lipid panel; Future; Expected date: 10/04/2018  -     Urinalysis  -     Continue AmLODIPine (NORVASC) 10 MG tablet; Take 1 tablet (10 mg total) by mouth once daily.  Dispense: 90 tablet; Refill: 3  -     Increase Losartan (COZAAR) 100 MG tablet; Take 1 tablet (100 mg total) by mouth once daily.  Dispense: 90 tablet; Refill: 1    Pre-diabetes  -     Hemoglobin A1c; Future;  Expected date: 10/04/2018  -     Continue diet and exercise for glucose control and heart health.    Vitamin D deficiency  -     Vitamin D; Future; Expected date: 10/04/2018    Primary osteoarthritis involving multiple joints  -     meloxicam (MOBIC) 15 MG tablet; Take 1 tablet (15 mg total) by mouth daily as needed for Pain.  Dispense: 90 tablet; Refill: 3    Gastroesophageal reflux disease, esophagitis presence not specified  -     omeprazole (PRILOSEC) 20 MG capsule; Take 1 capsule (20 mg total) by mouth once daily.  Dispense: 90 capsule; Refill: 3    Menopausal symptoms  -     estradiol (ESTRACE) 1 MG tablet; Take 1 tablet (1 mg total) by mouth once daily.  Dispense: 90 tablet; Refill: 3    Adjustment disorder with depressed mood        -     Continue Paxil, refills available.     ROSALIA (generalized anxiety disorder)  -     ALPRAZolam (XANAX) 2 MG Tab; Take 1 tablet (2 mg total) by mouth 2 (two) times daily as needed.  Dispense: 60 tablet; Refill: 5    Encounter for screening mammogram for breast cancer  -     Mammo Digital Screening Bilat with CAD due in December.     Influenza vaccine needed - Flu shot today.     Shingrix when available.

## 2018-10-06 ENCOUNTER — PATIENT MESSAGE (OUTPATIENT)
Dept: SPORTS MEDICINE | Facility: CLINIC | Age: 68
End: 2018-10-06

## 2018-11-06 ENCOUNTER — PATIENT MESSAGE (OUTPATIENT)
Dept: INTERNAL MEDICINE | Facility: CLINIC | Age: 68
End: 2018-11-06

## 2018-11-17 ENCOUNTER — PATIENT MESSAGE (OUTPATIENT)
Dept: INTERNAL MEDICINE | Facility: CLINIC | Age: 68
End: 2018-11-17

## 2018-12-17 ENCOUNTER — HOSPITAL ENCOUNTER (OUTPATIENT)
Dept: RADIOLOGY | Facility: HOSPITAL | Age: 68
Discharge: HOME | End: 2018-12-17
Attending: INTERNAL MEDICINE
Payer: MEDICARE

## 2018-12-17 DIAGNOSIS — Z12.31 ENCOUNTER FOR SCREENING MAMMOGRAM FOR BREAST CANCER: ICD-10-CM

## 2018-12-17 PROCEDURE — 77067 SCR MAMMO BI INCL CAD: CPT | Mod: 26,,, | Performed by: RADIOLOGY

## 2018-12-17 PROCEDURE — 77063 BREAST TOMOSYNTHESIS BI: CPT | Mod: TC

## 2018-12-17 PROCEDURE — 77063 BREAST TOMOSYNTHESIS BI: CPT | Mod: 26,,, | Performed by: RADIOLOGY

## 2018-12-17 PROCEDURE — 77067 SCR MAMMO BI INCL CAD: CPT | Mod: TC

## 2019-01-01 ENCOUNTER — PATIENT MESSAGE (OUTPATIENT)
Dept: INTERNAL MEDICINE | Facility: CLINIC | Age: 69
End: 2019-01-01

## 2019-03-30 ENCOUNTER — HOSPITAL ENCOUNTER (EMERGENCY)
Facility: HOSPITAL | Age: 69
Discharge: HOME | End: 2019-03-30
Attending: SURGERY
Payer: MEDICARE

## 2019-03-30 VITALS
DIASTOLIC BLOOD PRESSURE: 74 MMHG | BODY MASS INDEX: 26.99 KG/M2 | TEMPERATURE: 99 F | HEIGHT: 65 IN | HEART RATE: 87 BPM | RESPIRATION RATE: 18 BRPM | WEIGHT: 162 LBS | OXYGEN SATURATION: 97 % | SYSTOLIC BLOOD PRESSURE: 137 MMHG

## 2019-03-30 DIAGNOSIS — J01.00 ACUTE NON-RECURRENT MAXILLARY SINUSITIS: Primary | ICD-10-CM

## 2019-03-30 LAB
BILIRUB UR QL STRIP: NEGATIVE
CLARITY UR REFRACT.AUTO: CLEAR
COLOR UR AUTO: YELLOW
GLUCOSE UR QL STRIP: NEGATIVE
HGB UR QL STRIP: NEGATIVE
INFLUENZA A, MOLECULAR: NEGATIVE
INFLUENZA B, MOLECULAR: NEGATIVE
KETONES UR QL STRIP: NEGATIVE
LEUKOCYTE ESTERASE UR QL STRIP: NEGATIVE
NITRITE UR QL STRIP: NEGATIVE
PH UR STRIP: 5 [PH] (ref 5–8)
PROT UR QL STRIP: NEGATIVE
SP GR UR STRIP: 1.01 (ref 1–1.03)
SPECIMEN SOURCE: NORMAL
URN SPEC COLLECT METH UR: NORMAL
UROBILINOGEN UR STRIP-ACNC: NEGATIVE EU/DL

## 2019-03-30 PROCEDURE — 81003 URINALYSIS AUTO W/O SCOPE: CPT | Mod: ER

## 2019-03-30 PROCEDURE — 25000003 PHARM REV CODE 250: Mod: ER | Performed by: SURGERY

## 2019-03-30 PROCEDURE — 99283 EMERGENCY DEPT VISIT LOW MDM: CPT | Mod: ER

## 2019-03-30 PROCEDURE — 87502 INFLUENZA DNA AMP PROBE: CPT | Mod: ER

## 2019-03-30 RX ORDER — METHYLPREDNISOLONE 4 MG/1
TABLET ORAL
Qty: 1 PACKAGE | Refills: 0 | Status: SHIPPED | OUTPATIENT
Start: 2019-03-30 | End: 2019-04-09

## 2019-03-30 RX ORDER — BUTALBITAL, ACETAMINOPHEN AND CAFFEINE 50; 325; 40 MG/1; MG/1; MG/1
1 TABLET ORAL
Status: COMPLETED | OUTPATIENT
Start: 2019-03-30 | End: 2019-03-30

## 2019-03-30 RX ORDER — HYDROCODONE BITARTRATE AND ACETAMINOPHEN 7.5; 325 MG/15ML; MG/15ML
5 SOLUTION ORAL EVERY 6 HOURS PRN
Qty: 120 ML | Refills: 0 | Status: SHIPPED | OUTPATIENT
Start: 2019-03-30 | End: 2019-04-09

## 2019-03-30 RX ADMIN — BUTALBITAL, ACETAMINOPHEN, AND CAFFEINE 1 TABLET: 50; 325; 40 TABLET ORAL at 12:03

## 2019-03-30 NOTE — ED PROVIDER NOTES
Encounter Date: 3/30/2019       History     Chief Complaint   Patient presents with    Fever     c/o fever with generalized body aches x 1 week; denies cough; reports symptoms worse at night; also c/o dysuria earlier in the week    Generalized Body Aches    Dysuria     Patient complains of low-grade body aches sinus congestion for last week.  She states she has been working in the garden during the day and night she has trouble getting to sleep because her shoulders hurt and her neck hurts and she has a headache. She thinks it might be related to the allergies working in the [employer] she also complains of dysuria    The history is provided by the patient.   Fever   Primary symptoms of the febrile illness include fever, fatigue, headaches and dysuria. Primary symptoms do not include cough, shortness of breath, abdominal pain or nausea. The current episode started 6 to 7 days ago. This is a new problem.   The maximum temperature recorded prior to her arrival was unknown.   The fatigue began 3 to 5 days ago.   The headache began 2 days ago. The pain from the headache is at a severity of 3/10. Location/region(s) of the headache: frontal. The headache is associated with straining.   Dysuria    Pertinent negatives include no nausea.     Review of patient's allergies indicates:   Allergen Reactions    Sulfamethoxazole-trimethoprim Nausea And Vomiting and Other (See Comments)     Other reaction(s): Migraine     Past Medical History:   Diagnosis Date    Anxiety     Cataract     Depression     Family history of colon cancer     GERD (gastroesophageal reflux disease)     Hypertension     Lumbar spondylosis     Lumbar spondylosis     Overactive bladder     Urge urinary incontinence      Past Surgical History:   Procedure Laterality Date    APPENDECTOMY      BLEPHAROPLASTY/CONSERVATIVE Bilateral 11/13/2017    Performed by Jamila [facility]erman MD at [facility] OR [address] [place] [address]    CATARACT EXTRACTION W/  INTRAOCULAR LENS IMPLANT Left  06/03/14    Dr Drake    CATARACT EXTRACTION W/  INTRAOCULAR LENS IMPLANT Right 7/7/14    COLONOSCOPY N/A 11/2/2015    Performed by Desmond [facility]illas [facility] at [facility] ENDO (4TH FLR)    EYE SURGERY      HYSTERECTOMY      partial    INSERTION-INTRAOCULAR LENS (IOL) Right 7/7/2014    Performed by Marline [facility] at [facility] OR    INSERTION-INTRAOCULAR LENS (IOL) Left 5/26/2014    Performed by [facility]line [facility] at [facility] OR    KNEE SURGERY Left     PHACOEMULSIFICATION-ASPIRATION-CATARACT Right 7/7/2014    Performed by [facility] at [facility] OR    PHACOEMULSIFICATION-ASPIRATION-CATARACT Left 5/26/2014    Performed by [facility]line Drake [facility] at [facility] OR    REPAIR-PTOSIS/MMCR 10/12 Bilateral 11/13/2017    Performed by [facility] at [facility] OR 2ND FLR    TUBAL LIGATION       Family History   Problem Relation Age of Onset    Hypertension Mother     Kidney disease Mother     Heart disease Mother     Dementia Father     Cancer Father         bladder    Basal cell carcinoma Father     Diabetes Maternal Grandmother     Diabetes Paternal Grandmother     Cancer Maternal Uncle         colon cancer    Hypertension Sister     Diabetes Sister     Diabetes Brother     Hypertension Brother     No Known Problems Daughter     No Known Problems Son     Hypertension Sister     Cancer Sister         breast cancer    Breast cancer Sister     Amblyopia Neg Hx     Blindness Neg Hx     Cataracts Neg Hx     Glaucoma Neg Hx     Macular degeneration Neg Hx     Retinal detachment Neg Hx     Strabismus Neg Hx     Melanoma Neg Hx      Social History     Tobacco Use    Smoking status: Passive Smoke Exposure - Never Smoker    Smokeless tobacco: Never Used   Substance Use Topics    Alcohol use: Yes     Comment: Social.    Drug use: No     Review of Systems   Constitutional: Positive for fatigue and fever.   HENT: Negative.    Eyes: Negative.    Respiratory: Negative.  Negative for cough and shortness of breath.     Cardiovascular: Negative.    Gastrointestinal: Negative for abdominal pain and nausea.   Endocrine: Negative.    Genitourinary: Positive for dysuria.   Musculoskeletal: Negative.    Skin: Negative.    Allergic/Immunologic: Negative.    Neurological: Positive for headaches.   Hematological: Negative.    Psychiatric/Behavioral: Negative.        Physical Exam     Initial Vitals [03/30/19 1130]   BP Pulse Resp Temp SpO2   137/74 87 18 98.6 °F (37 °C) 97 %      MAP       --         Physical Exam    Nursing note and vitals reviewed.  Constitutional: She appears well-developed and well-nourished.   HENT:   Nose: Nose normal.   Mouth/Throat: No oropharyngeal exudate.   Pain on palpation over maxillary sinus   Eyes: Conjunctivae are normal.   Neck: Normal range of motion.   Cardiovascular: Normal rate and intact distal pulses.   Pulmonary/Chest: Breath sounds normal.   Abdominal: Soft.   Musculoskeletal: Normal range of motion.   Neurological: She is alert and oriented to person, place, and time. GCS score is 15. GCS eye subscore is 4. GCS verbal subscore is 5. GCS motor subscore is 6.   Skin: Skin is warm and dry. Capillary refill takes less than 2 seconds.   Psychiatric: She has a normal mood and affect.         ED Course   Procedures  Labs Reviewed   INFLUENZA A & B BY MOLECULAR   URINALYSIS, REFLEX TO URINE CULTURE    Narrative:     Preferred Collection Type->Urine, Clean Catch          Imaging Results    None          Medical Decision Making:   Initial Assessment:   Acute sinusitis secondary to allergy  ED Management:  Recommend steroids and decongestants                      Clinical Impression:       ICD-10-CM ICD-9-CM   1. Acute non-recurrent maxillary sinusitis J01.00 461.0         Disposition:   Disposition: Discharged                        FAUSTINO Cervantes III, MD  03/30/19 [id number]

## 2019-04-09 ENCOUNTER — OFFICE VISIT (OUTPATIENT)
Dept: INTERNAL MEDICINE | Facility: CLINIC | Age: 69
End: 2019-04-09
Payer: MEDICARE

## 2019-04-09 VITALS
HEIGHT: 65 IN | HEART RATE: 75 BPM | DIASTOLIC BLOOD PRESSURE: 80 MMHG | SYSTOLIC BLOOD PRESSURE: 126 MMHG | WEIGHT: 163.13 LBS | BODY MASS INDEX: 27.18 KG/M2

## 2019-04-09 DIAGNOSIS — I10 ESSENTIAL HYPERTENSION: Primary | ICD-10-CM

## 2019-04-09 DIAGNOSIS — F43.21 ADJUSTMENT DISORDER WITH DEPRESSED MOOD: ICD-10-CM

## 2019-04-09 DIAGNOSIS — Z23 NEED FOR DIPHTHERIA-TETANUS-PERTUSSIS (TDAP) VACCINE: ICD-10-CM

## 2019-04-09 DIAGNOSIS — F41.1 GAD (GENERALIZED ANXIETY DISORDER): ICD-10-CM

## 2019-04-09 DIAGNOSIS — Z79.899 LONG-TERM CURRENT USE OF ANXIOLYTIC MEDICATION: ICD-10-CM

## 2019-04-09 DIAGNOSIS — R73.03 PRE-DIABETES: ICD-10-CM

## 2019-04-09 PROCEDURE — 99214 OFFICE O/P EST MOD 30 MIN: CPT | Mod: S$GLB,,, | Performed by: INTERNAL MEDICINE

## 2019-04-09 PROCEDURE — 99214 PR OFFICE/OUTPT VISIT, EST, LEVL IV, 30-39 MIN: ICD-10-PCS | Mod: S$GLB,,, | Performed by: INTERNAL MEDICINE

## 2019-04-09 PROCEDURE — 3074F PR MOST RECENT SYSTOLIC BLOOD PRESSURE < 130 MM HG: ICD-10-PCS | Mod: CPTII,S$GLB,, | Performed by: INTERNAL MEDICINE

## 2019-04-09 PROCEDURE — 3074F SYST BP LT 130 MM HG: CPT | Mod: CPTII,S$GLB,, | Performed by: INTERNAL MEDICINE

## 2019-04-09 PROCEDURE — 1101F PT FALLS ASSESS-DOCD LE1/YR: CPT | Mod: CPTII,S$GLB,, | Performed by: INTERNAL MEDICINE

## 2019-04-09 PROCEDURE — 99499 RISK ADDL DX/OHS AUDIT: ICD-10-PCS | Mod: S$GLB,,, | Performed by: INTERNAL MEDICINE

## 2019-04-09 PROCEDURE — 3079F DIAST BP 80-89 MM HG: CPT | Mod: CPTII,S$GLB,, | Performed by: INTERNAL MEDICINE

## 2019-04-09 PROCEDURE — 3079F PR MOST RECENT DIASTOLIC BLOOD PRESSURE 80-89 MM HG: ICD-10-PCS | Mod: CPTII,S$GLB,, | Performed by: INTERNAL MEDICINE

## 2019-04-09 PROCEDURE — 99999 PR PBB SHADOW E&M-EST. PATIENT-LVL III: ICD-10-PCS | Mod: PBBFAC,,, | Performed by: INTERNAL MEDICINE

## 2019-04-09 PROCEDURE — 99999 PR PBB SHADOW E&M-EST. PATIENT-LVL III: CPT | Mod: PBBFAC,,, | Performed by: INTERNAL MEDICINE

## 2019-04-09 PROCEDURE — 1101F PR PT FALLS ASSESS DOC 0-1 FALLS W/OUT INJ PAST YR: ICD-10-PCS | Mod: CPTII,S$GLB,, | Performed by: INTERNAL MEDICINE

## 2019-04-09 PROCEDURE — 99499 UNLISTED E&M SERVICE: CPT | Mod: S$GLB,,, | Performed by: INTERNAL MEDICINE

## 2019-04-09 RX ORDER — LOSARTAN POTASSIUM 100 MG/1
100 TABLET ORAL DAILY
Qty: 90 TABLET | Refills: 1 | Status: SHIPPED | OUTPATIENT
Start: 2019-04-09 | End: 2019-09-24 | Stop reason: SDUPTHER

## 2019-04-09 RX ORDER — ALPRAZOLAM 2 MG/1
2 TABLET ORAL 2 TIMES DAILY PRN
Qty: 60 TABLET | Refills: 2 | Status: SHIPPED | OUTPATIENT
Start: 2019-04-09 | End: 2019-07-15 | Stop reason: SDUPTHER

## 2019-04-09 RX ORDER — PAROXETINE HYDROCHLORIDE 40 MG/1
40 TABLET, FILM COATED ORAL DAILY
Qty: 90 TABLET | Refills: 1 | Status: SHIPPED | OUTPATIENT
Start: 2019-04-09 | End: 2019-10-06 | Stop reason: SDUPTHER

## 2019-04-09 NOTE — PROGRESS NOTES
Subjective:       Patient ID: Nicole Jacques is a 69 y.o. female.    Chief Complaint: Hypertension    Last seen 6 months ago for annual exam. Returns for f/u chronic medical conditions. No acute complaints, feeling well. Had a flare of rhinitis treated ten days ago with a Medrol dosepack, may have been a cold virus - she had headache and body aches. It has improved. She uses Claritin daily for allergies. Home BP monitoring is generally normal per history, no log for review. She remains active playing tennis and gardening, without physical limitation.     PMH: , misc x 1.    Hypertension.  Pre-Diabetes, HbA1c up to 6.3%.  GERD, EGD  only showed hiatal hernia.  Depression with Anxiety.    Lumbar Spondylosis.  Mild OA Knees.   Sigmoid Diverticulosis.     Colonoscopy 11/15 normal. Mammogram normal . Pelvic exam 10/16. BMD normal 12/15. Eye exam . EKG normal . Pneumovax . Prevnar 8/15. Has had Zostavax. Flu shot 10/18. Shingrix . Labs 10/18: CBC normal, CMP normal, HbA1c 5.7%, Vit D 43, TChol 196, TG 99, HDL 69, , Urinalysis clear.    SOCIAL: No tobacco,  smokes. Social alcohol. No illicit drug usage.  with 2 adult children who live locally. Retired  at [employer] and [employer] store.     PSH: BTL, Hysterectomy due to heavy menses, ovaries remain. Left knee arthroscopic surgery due to injury kick boxing. Appendectomy. Bilateral Cataract extractions and Blepharoplasty.     FMH: Father  age 81 with bladder cancer and dementia. Mother  age 78 with hypertension and renal failure, and CHF, possible osteoporosis. Diabetes in grandmothers. Anxiety - mother, sister. Colon cancer in maternal uncle.    Allergies: Bactrim.    Medications: list reviewed and reconciled.             Review of Systems   Constitutional: Negative for fatigue and fever.   HENT: Negative for congestion, ear pain, sinus pain, sore throat and trouble swallowing.    Eyes: Negative for pain and  "visual disturbance.   Respiratory: Negative for cough and shortness of breath.    Cardiovascular: Negative for chest pain, palpitations and leg swelling.   Gastrointestinal: Negative for abdominal pain, nausea and vomiting.   Genitourinary: Negative for dysuria and hematuria.   Musculoskeletal: Negative for arthralgias and myalgias.   Skin: Negative for rash and wound.   Neurological: Negative for dizziness, syncope, weakness and headaches.   Psychiatric/Behavioral: Negative for dysphoric mood. The patient is nervous/anxious.        Objective:    /80, Pulse 74, Ht 5' 5", Wt 163 lbs, BMI=27  Physical Exam   Constitutional: She is oriented to person, place, and time. She appears well-developed and well-nourished. No distress.   HENT:   Nose: Nose normal.   Mouth/Throat: Oropharynx is clear and moist.   Eyes: Conjunctivae and EOM are normal.   Neck: Normal range of motion. Neck supple. No JVD present.   Cardiovascular: Normal rate, regular rhythm and normal heart sounds.   Pulmonary/Chest: Effort normal and breath sounds normal. No respiratory distress. She has no wheezes. She has no rales.   Musculoskeletal: Normal range of motion. She exhibits no edema or deformity.   Lymphadenopathy:     She has no cervical adenopathy.   Neurological: She is alert and oriented to person, place, and time. No cranial nerve deficit. Coordination normal.   Skin: Skin is warm and dry.   Psychiatric: She has a normal mood and affect. Her behavior is normal. Thought content normal.       Assessment:       1. Essential hypertension    2. Pre-diabetes    3. ROSALIA (generalized anxiety disorder)    4. Long-term current use of anxiolytic medication    5. Need for diphtheria-tetanus-pertussis (Tdap) vaccine        Plan:       Essential hypertension improved, continue present care.   -     losartan (COZAAR) 100 MG tablet; Take 1 tablet (100 mg total) by mouth once daily.  Dispense: 90 tablet; Refill: 1    Pre-diabetes - stable with diet and " exercise.    ROSALIA (generalized anxiety disorder)  -     ALPRAZolam (XANAX) 2 MG Tab; Take 1 tablet (2 mg total) by mouth 2 (two) times daily as needed.  Dispense: 60 tablet; Refill: 2  -     paroxetine (PAXIL) 40 MG tablet; Take 1 tablet (40 mg total) by mouth once daily.  Dispense: 90 tablet; Refill: 1    Long-term current use of anxiolytic medication - uncomplicated, no misuse.     Need for diphtheria-tetanus-pertussis (Tdap) vaccine - Tdap today.    Adjustment disorder with depressed mood  -     paroxetine (PAXIL) 40 MG tablet; Take 1 tablet (40 mg total) by mouth once daily.  Dispense: 90 tablet; Refill: 1

## 2019-07-03 ENCOUNTER — OFFICE VISIT (OUTPATIENT)
Dept: OBSTETRICS AND GYNECOLOGY | Facility: CLINIC | Age: 69
End: 2019-07-03
Payer: MEDICARE

## 2019-07-03 VITALS
SYSTOLIC BLOOD PRESSURE: 124 MMHG | HEIGHT: 65 IN | BODY MASS INDEX: 26.38 KG/M2 | WEIGHT: 158.31 LBS | DIASTOLIC BLOOD PRESSURE: 75 MMHG

## 2019-07-03 DIAGNOSIS — N39.41 URGE INCONTINENCE OF URINE: ICD-10-CM

## 2019-07-03 DIAGNOSIS — Z01.419 WELL WOMAN EXAM WITH ROUTINE GYNECOLOGICAL EXAM: Primary | ICD-10-CM

## 2019-07-03 PROCEDURE — G0101 PR CA SCREEN;PELVIC/BREAST EXAM: ICD-10-PCS | Mod: S$GLB,,, | Performed by: OBSTETRICS & GYNECOLOGY

## 2019-07-03 PROCEDURE — 3078F PR MOST RECENT DIASTOLIC BLOOD PRESSURE < 80 MM HG: ICD-10-PCS | Mod: CPTII,S$GLB,, | Performed by: OBSTETRICS & GYNECOLOGY

## 2019-07-03 PROCEDURE — 3074F PR MOST RECENT SYSTOLIC BLOOD PRESSURE < 130 MM HG: ICD-10-PCS | Mod: CPTII,S$GLB,, | Performed by: OBSTETRICS & GYNECOLOGY

## 2019-07-03 PROCEDURE — 3074F SYST BP LT 130 MM HG: CPT | Mod: CPTII,S$GLB,, | Performed by: OBSTETRICS & GYNECOLOGY

## 2019-07-03 PROCEDURE — 99999 PR PBB SHADOW E&M-EST. PATIENT-LVL III: CPT | Mod: PBBFAC,,, | Performed by: OBSTETRICS & GYNECOLOGY

## 2019-07-03 PROCEDURE — 99999 PR PBB SHADOW E&M-EST. PATIENT-LVL III: ICD-10-PCS | Mod: PBBFAC,,, | Performed by: OBSTETRICS & GYNECOLOGY

## 2019-07-03 PROCEDURE — 3078F DIAST BP <80 MM HG: CPT | Mod: CPTII,S$GLB,, | Performed by: OBSTETRICS & GYNECOLOGY

## 2019-07-03 PROCEDURE — G0101 CA SCREEN;PELVIC/BREAST EXAM: HCPCS | Mod: S$GLB,,, | Performed by: OBSTETRICS & GYNECOLOGY

## 2019-07-03 NOTE — PROGRESS NOTES
Subjective:       Patient ID: Nicole Jacques is a 69 y.o. female.    Chief Complaint:  Well Woman      History of Present Illness  HPI  This 69 yr old P2 female is here for routine exam and is having issues with urinary incont. She leaks with urge.  No other issues today and is due for mammogram.  Had normal pap last year.  She is doing well on estrace 1mg daily.  We reviewed risks and benefits of continuing her HRT and she desires to continue    GYN & OB History  No LMP recorded. Patient has had a hysterectomy.   Date of Last Pap: 2018    OB History    Para Term  AB Living   2 2 2         SAB TAB Ectopic Multiple Live Births                  # Outcome Date GA Lbr Lui/2nd Weight Sex Delivery Anes PTL Lv   2 Term            1 Term                Review of Systems  Review of Systems   Constitutional: Negative for chills and fever.   Respiratory: Negative for shortness of breath.    Cardiovascular: Negative for chest pain.   Gastrointestinal: Negative for abdominal pain, nausea and vomiting.   Genitourinary: Negative for difficulty urinating, dyspareunia, genital sores, menstrual problem, pelvic pain, vaginal bleeding, vaginal discharge and vaginal pain.   Skin: Negative for wound.   Hematological: Negative for adenopathy.           Objective:   Physical Exam:   Constitutional: She is oriented to person, place, and time. She appears well-developed and well-nourished.    HENT:   Head: Normocephalic.    Eyes: EOM are normal.    Neck: Normal range of motion.    Cardiovascular: Normal rate.     Pulmonary/Chest: Effort normal. She exhibits no mass and no tenderness. Right breast exhibits no inverted nipple, no mass, no skin change and no tenderness. Left breast exhibits no inverted nipple, no mass, no skin change and no tenderness.        Abdominal: Soft. She exhibits no distension. There is no tenderness.     Genitourinary: Vagina normal. There is no rash, tenderness or lesion on the right labia. There  is no rash, tenderness or lesion on the left labia. Uterus is absent. Uterus is not tender. Cervix is normal. Right adnexum displays no mass, no tenderness and no fullness. Left adnexum displays no mass, no tenderness and no fullness. Cervix exhibits no discharge.           Musculoskeletal: Normal range of motion.       Neurological: She is alert and oriented to person, place, and time.    Skin: Skin is warm and dry.    Psychiatric: She has a normal mood and affect.     Pt does not have cervix     Assessment:        1. Well woman exam with routine gynecological exam    2. Urge incontinence of urine               Plan:      Refer to urogyn  Follow up yearly for physical exam  Mammogram yearly

## 2019-07-15 DIAGNOSIS — F41.1 GAD (GENERALIZED ANXIETY DISORDER): ICD-10-CM

## 2019-07-16 RX ORDER — ALPRAZOLAM 2 MG/1
TABLET ORAL
Qty: 60 TABLET | Refills: 2 | Status: SHIPPED | OUTPATIENT
Start: 2019-07-16 | End: 2019-10-10 | Stop reason: SDUPTHER

## 2019-07-17 DIAGNOSIS — R32 URINARY INCONTINENCE, UNSPECIFIED TYPE: Primary | ICD-10-CM

## 2019-08-12 DIAGNOSIS — K21.9 GASTROESOPHAGEAL REFLUX DISEASE, ESOPHAGITIS PRESENCE NOT SPECIFIED: ICD-10-CM

## 2019-08-13 RX ORDER — OMEPRAZOLE 20 MG/1
CAPSULE, DELAYED RELEASE ORAL
Qty: 90 CAPSULE | Refills: 1 | Status: SHIPPED | OUTPATIENT
Start: 2019-08-13 | End: 2020-03-20 | Stop reason: SDUPTHER

## 2019-08-24 ENCOUNTER — PATIENT MESSAGE (OUTPATIENT)
Dept: OBSTETRICS AND GYNECOLOGY | Facility: CLINIC | Age: 69
End: 2019-08-24

## 2019-08-26 NOTE — PROGRESS NOTES
CHIEF COMPLAINT:    Mrs. Jacques is a 69 y.o. female presenting as a self referred patient for evaluation of urge predominant mixed incontinence    PRESENTING ILLNESS:    Nicole Jacques is a 69 y.o. female who is new to me, seen Dr. Jackie Samuels in 2017.  She has a history of urge predominant mixed incontinence.  She takes oxybutynin XL 10 mg occasionally, but cannot tolerate it due to dry mouth.  She has tried Detrol LA but it was ineffective.  The symptoms have been ongoing for many years. She also has urge incontinence, wears 2 Kotex sized pads a day.  She has frequency x 6-7, nocturia x 2, denies a history of gross hematuria or recurrent UTI.  She is interested in Botox    , hysterectomy for bleeding, is sexually active, has no pain but has vaginal dryness, sees Dr. Black.  Occasional constipation, that she manages with Colon Clens    REVIEW OF SYSTEMS:    Review of Systems   Constitutional: Negative.    HENT: Negative.    Eyes: Negative.    Respiratory: Negative.    Cardiovascular: Negative.    Gastrointestinal: Negative.    Genitourinary: Positive for frequency and urgency.        Mixed incontinence, urge predominant   Musculoskeletal: Negative.    Skin: Negative.    Neurological: Negative.    Endo/Heme/Allergies: Negative.    Psychiatric/Behavioral: Negative.        PATIENT HISTORY:    Past Medical History:   Diagnosis Date    Anxiety     Cataract     Depression     Family history of colon cancer     GERD (gastroesophageal reflux disease)     Hypertension     Lumbar spondylosis     Lumbar spondylosis     Overactive bladder     Urge urinary incontinence        Past Surgical History:   Procedure Laterality Date    APPENDECTOMY      BLEPHAROPLASTY/CONSERVATIVE Bilateral 2017    Performed by [facility] at [facility] OR [address] [place] [address]    CATARACT EXTRACTION W/  INTRAOCULAR LENS IMPLANT Left 14    Dr Drake    CATARACT EXTRACTION W/  INTRAOCULAR LENS IMPLANT Right 14    COLONOSCOPY  N/A 11/2/2015    Performed by Desmond Casillas MD at [facility] ENDO (4TH FLR)    EYE SURGERY      HYSTERECTOMY      partial    INSERTION-INTRAOCULAR LENS (IOL) Right 7/7/2014    Performed by [facility] at [facility] OR    INSERTION-INTRAOCULAR LENS (IOL) Left 5/26/2014    Performed by [facility]line Drake MD at [facility] OR    KNEE SURGERY Left     PHACOEMULSIFICATION-ASPIRATION-CATARACT Right 7/7/2014    Performed by [facility] MD at [facility] OR    PHACOEMULSIFICATION-ASPIRATION-CATARACT Left 5/26/2014    Performed by [facility]line Drake MD at [facility] OR    REPAIR-PTOSIS/MMCR 10/12 Bilateral 11/13/2017    Performed by Jamila [facility]erman MD at [facility] OR 2ND FLR    TUBAL LIGATION         Family History   Problem Relation Age of Onset    Hypertension Mother     Kidney disease Mother     Heart disease Mother     Dementia Father     Cancer Father         bladder    Basal cell carcinoma Father     Diabetes Maternal Grandmother     Diabetes Paternal Grandmother     Cancer Maternal Uncle         colon cancer    Hypertension Sister     Diabetes Sister     Diabetes Brother     Hypertension Brother     Hypertension Sister     Cancer Sister         breast cancer    Breast cancer Sister      Socioeconomic History    Marital status:    Tobacco Use    Smoking status: Passive Smoke Exposure - Never Smoker    Smokeless tobacco: Never Used   Substance and Sexual Activity    Alcohol use: Yes     Comment: Social.    Drug use: No    Sexual activity: Yes     Partners: Male     Birth control/protection: None       Allergies:  Sulfamethoxazole-trimethoprim    Medications:  Outpatient Encounter Medications as of 8/29/2019   Medication Sig Dispense Refill    ALPRAZolam (XANAX) 2 MG Tab TAKE 1 TABLET BY MOUTH TWICE DAILY AS NEEDED 60 tablet 2    amLODIPine (NORVASC) 10 MG tablet Take 1 tablet (10 mg total) by mouth once daily. 90 tablet 3    ascorbic acid (VITAMIN C) 1000 MG tablet Take 2 tablets by mouth every morning. Takes  two 1000 mg tablets every morning      aspirin (ECOTRIN) 81 MG EC tablet Take 81 mg by mouth every morning.      B-complex with vitamin C (Z-BEC OR EQUIV) tablet Take 1 tablet by mouth every morning.       co-enzyme Q-10 30 mg capsule Take 30 mg by mouth every morning.       estradiol (ESTRACE) 1 MG tablet Take 1 tablet (1 mg total) by mouth once daily. 90 tablet 3    losartan (COZAAR) 100 MG tablet Take 1 tablet (100 mg total) by mouth once daily. 90 tablet 1    LUBRICANT EYE DROPS, GLYC-PG, 1-0.3 % Drop Place 1 drop into both eyes as needed.       meloxicam (MOBIC) 15 MG tablet Take 1 tablet (15 mg total) by mouth daily as needed for Pain. 90 tablet 3    MULTIVITAMIN (MULTIPLE VITAMIN ESSENTIAL ORAL) Take 1 tablet by mouth every morning.       omega-3 fatty acids (FISH OIL) 500 mg Cap Take 1 capsule by mouth every morning.       omeprazole (PRILOSEC) 20 MG capsule TAKE 1 CAPSULE ONCE DAILY 90 capsule 1    paroxetine (PAXIL) 40 MG tablet Take 1 tablet (40 mg total) by mouth once daily. 90 tablet 1    VITAMIN D3 5,000 unit Tab Take 5,000 Units by mouth every morning.        No facility-administered encounter medications on file as of 8/29/2019.          PHYSICAL EXAMINATION:    The patient generally appears in good health, is appropriately interactive, and is in no apparent distress.    Skin: No lesions.    Mental: Cooperative with normal affect.    Neuro: Grossly intact.    HEENT: Normal. No evidence of lymphadenopathy.    Chest:  normal inspiratory effort.    Abdomen:  Soft, non-tender. No masses or organomegaly. Bladder is not palpable. No evidence of flank discomfort. No evidence of inguinal hernia.    Extremities: No clubbing, cyanosis, or edema    Normal external female genitalia  Urethral meatus is normal  Urethra and bladder are nontender to bimanual exam  anteriorly has a stage I lateral cystocele  posteriorly well supported  Uterus and cervix are surgically absent  No adnexal masses  Urethral  mobility from -20 to + 20  PVR by catheterization was < 10 ml    LABS:    Lab Results   Component Value Date    BUN 22 10/04/2018    CREATININE 0.9 10/04/2018     UA 1.015,  PH 6, tr leuk, 100 glucose, otherwise, negative    IMPRESSION:    Encounter Diagnoses   Name Primary?    Mixed stress and urge urinary incontinence Yes       PLAN:    1.  For SUDS/Cysto  2.  The catheterized specimen was sent for culture  3.  Discussed the workup and once the UDS and cystoscopy are done, will discuss plan.  (she empties well which is good for Botox patients.)

## 2019-08-29 ENCOUNTER — OFFICE VISIT (OUTPATIENT)
Dept: UROLOGY | Facility: CLINIC | Age: 69
End: 2019-08-29
Payer: MEDICARE

## 2019-08-29 VITALS
WEIGHT: 160.94 LBS | HEIGHT: 65 IN | BODY MASS INDEX: 26.81 KG/M2 | DIASTOLIC BLOOD PRESSURE: 72 MMHG | SYSTOLIC BLOOD PRESSURE: 118 MMHG | HEART RATE: 78 BPM

## 2019-08-29 DIAGNOSIS — N39.46 MIXED STRESS AND URGE URINARY INCONTINENCE: Primary | ICD-10-CM

## 2019-08-29 PROCEDURE — 51701 PR INSERTION OF NON-INDWELLING BLADDER CATHETERIZATION FOR RESIDUAL UR: ICD-10-PCS | Mod: S$GLB,,, | Performed by: UROLOGY

## 2019-08-29 PROCEDURE — 3074F SYST BP LT 130 MM HG: CPT | Mod: CPTII,S$GLB,, | Performed by: UROLOGY

## 2019-08-29 PROCEDURE — 51701 INSERT BLADDER CATHETER: CPT | Mod: S$GLB,,, | Performed by: UROLOGY

## 2019-08-29 PROCEDURE — 99999 PR PBB SHADOW E&M-EST. PATIENT-LVL IV: CPT | Mod: PBBFAC,,, | Performed by: UROLOGY

## 2019-08-29 PROCEDURE — 1101F PR PT FALLS ASSESS DOC 0-1 FALLS W/OUT INJ PAST YR: ICD-10-PCS | Mod: CPTII,S$GLB,, | Performed by: UROLOGY

## 2019-08-29 PROCEDURE — 3078F DIAST BP <80 MM HG: CPT | Mod: CPTII,S$GLB,, | Performed by: UROLOGY

## 2019-08-29 PROCEDURE — 1101F PT FALLS ASSESS-DOCD LE1/YR: CPT | Mod: CPTII,S$GLB,, | Performed by: UROLOGY

## 2019-08-29 PROCEDURE — 87086 URINE CULTURE/COLONY COUNT: CPT

## 2019-08-29 PROCEDURE — 3078F PR MOST RECENT DIASTOLIC BLOOD PRESSURE < 80 MM HG: ICD-10-PCS | Mod: CPTII,S$GLB,, | Performed by: UROLOGY

## 2019-08-29 PROCEDURE — 99214 OFFICE O/P EST MOD 30 MIN: CPT | Mod: 25,S$GLB,, | Performed by: UROLOGY

## 2019-08-29 PROCEDURE — 3074F PR MOST RECENT SYSTOLIC BLOOD PRESSURE < 130 MM HG: ICD-10-PCS | Mod: CPTII,S$GLB,, | Performed by: UROLOGY

## 2019-08-29 PROCEDURE — 99214 PR OFFICE/OUTPT VISIT, EST, LEVL IV, 30-39 MIN: ICD-10-PCS | Mod: 25,S$GLB,, | Performed by: UROLOGY

## 2019-08-29 PROCEDURE — 81002 PR URINALYSIS NONAUTO W/O SCOPE: ICD-10-PCS | Mod: S$GLB,,, | Performed by: UROLOGY

## 2019-08-29 PROCEDURE — 81002 URINALYSIS NONAUTO W/O SCOPE: CPT | Mod: S$GLB,,, | Performed by: UROLOGY

## 2019-08-29 PROCEDURE — 99999 PR PBB SHADOW E&M-EST. PATIENT-LVL IV: ICD-10-PCS | Mod: PBBFAC,,, | Performed by: UROLOGY

## 2019-08-29 RX ORDER — AMOXICILLIN 250 MG/1
500 CAPSULE ORAL ONCE
Status: CANCELLED | OUTPATIENT
Start: 2019-08-29 | End: 2019-08-29

## 2019-08-29 RX ORDER — LIDOCAINE HYDROCHLORIDE 20 MG/ML
JELLY TOPICAL ONCE
Status: CANCELLED | OUTPATIENT
Start: 2019-08-29 | End: 2019-08-29

## 2019-08-29 NOTE — PATIENT INSTRUCTIONS
Urodynamics Studies     The bladder holds urine until it leaves the body through the urethra.     Urodynamics studies are a series of tests that give your doctor a close look at the working of your bladder and urethra. The tests can help your doctor learn about any problems storing urine or voiding (eliminating) urine from your body.  Understanding the lower urinary tract  The lower part of the urinary tract has several parts.  · The bladder stores urine until youre ready to release it.  · The urethra is the tube that carries urine from the bladder out of the body.  · The sphincter is made up of muscles around the opening of the bladder. The sphincter muscles tighten to hold urine in the bladder. They relax to let urine flow. Signals from the brain tell the sphincter when to tighten and relax. These signals also tell the bladder when to contract to let urine flow out of the body.  Why you need a urodynamics study  This test may be ordered if you:  · Are incontinent (leak urine)  · Have a bladder that does not empty all the way.  · Have symptoms such as the need to urinate often or a constant strong need to urinate  · Have intermittent or weak urine stream  · Have persistent urinary tract infections  Preparing for the study  · Tell your doctor about any medicine youre taking. Ask if you should stop them before the study.  · Keep a diary of your bathroom habits. Do this for a few days before the study. This diary can be a helpful part of the evaluation.  · Ask if you need to arrive for the study with a full bladder.  Date Last Reviewed: 1/1/2017  © 5169-0290 [employer]. [address] [place] [address], [place] [address]. All rights reserved. This information is not intended as a substitute for professional medical care. Always follow your healthcare professional's instructions.          Urodynamic Studies     The equipment used for the study varies depending upon the facility and what tests are done.      Urodynamic studies may be done in your doctors office, a clinic, or a hospital. The studies may take up to an hour or more. This depends on which tests your doctor does. The tests are generally painless. You wont need sedating medicine.  Tests that may be done  Uroflowmetry. This measures the amount and speed of urine you void from your bladder. You urinate into a funnel. Its attached to a computer that records your urine flow over time. The amount of urine left in your bladder after you void may also be measured right after this test.  Cystometry. This test evaluates how much your bladder can hold. It also measures how strong your bladder muscle is and how well the signals work that tell you when your bladder is full. Your healthcare provider fills your bladder with sterile water or saline solution, through a catheter. Your doctor will instruct you to report any sensations you feel. Mention if theyre similar to symptoms youve felt at home. Your doctor may ask you to cough, stand and walk, or bear down during this test.  Electromyogram. This helps evaluate the muscle contractions that control urination, such as sphincter muscle contractions. Your healthcare provider may place electrode patches or wires near your rectum or urethra to make the recording. He or she may ask you to try to tighten or relax your sphincter muscles during this test.  Pressure flow study. This test measures your detrusor, urethral, and abdominal pressures. Detrusor is the muscle surrounding the bladder walls that relaxes to allow your bladder to fill, and and contracts to squeeze out urine. A pressure flow study is often done after cystometry. Youre asked to urinate while a probe in your urethra measures pressures.  Video cystourethrography. This takes video pictures of urine flow through your urinary tract. It can help identify blockages or other problems. The bladder is filled with an X-ray contrast fluid. Then X-ray video  pictures are taken as the fluid is urinated out. Ultrasound imaging may also be combined with routine urodynamic studies.  Ambulatory urodynamics. This test can be used to evaluate you while doing usual activities.  Getting your results  After the study, youll get dressed and return to the consultation room. Test results may be ready soon after the study is finished. Or, you may return to your doctors office in a few days for your results. Your doctor can talk with you about the study report and your options.   Date Last Reviewed: 1/1/2017 © 2000-2017 [employer]. [address] [place] [address]. All rights reserved. This information is not intended as a substitute for professional medical care. Always follow your healthcare professional's instructions.          Cystoscopy    Cystoscopy is a procedure that lets your doctor look directly inside your urethra and bladder. It can be used to:  · Help diagnose a problem with your urethra, bladder, or kidneys.  · Take a sample (biopsy) of bladder or urethral tissue.  · Treat certain problems (such as removing kidney stones).  · Place a stent to bypass an obstruction.  · Take special X-rays of the kidneys.  Based on the findings, your doctor may recommend other tests or treatments.  What is a cystoscope?  A cystoscope is a telescope-like instrument that contains lenses and fiberoptics (small glass wires that make bright light). The cystoscope may be straight and rigid, or flexible to bend around curves in the urethra. The doctor may look directly into the cystoscope, or project the image onto a monitor.  Getting ready  · Ask your doctor if you should stop taking any medicines before the procedure.  · Ask whether you should avoid eating or drinking anything after midnight before the procedure.  · Follow any other instructions your doctor gives you.  Tell your doctor before the exam if you:  · Take any medicines, such as aspirin or blood  thinners  · Have allergies to any medicines  · Are pregnant   The procedure  Cystoscopy is done in the doctors office, surgery center, or hospital. The doctor and a nurse are present during the procedure. It takes only a few minutes, longer if a biopsy, X-ray, or treatment needs to be done.  During the procedure:  · You lie on an exam table on your back, knees bent and legs apart. You are covered with a drape.  · Your urethra and the area around it are washed. Anesthetic jelly may be applied to numb the urethra. Other pain medicine is usually not needed. In some cases, you may be offered a mild sedative to help you relax. If a more extensive procedure is to be done, such as a biopsy or kidney stone removal, general anesthesia may be needed.  · The cystoscope is inserted. A sterile fluid is put into the bladder to expand it. You may feel pressure from this fluid.  · When the procedure is done, the cystoscope is removed.  After the procedure  If you had a sedative, general anesthesia, or spinal anesthesia, you must have someone drive you home. Once youre home:  · Drink plenty of fluids.  · You may have burning or light bleeding when you urinate--this is normal.  · Medicines may be prescribed to ease any discomfort or prevent infection. Take these as directed.  · Call your doctor if you have heavy bleeding or blood clots, burning that lasts more than a day, a fever over 100°F  (38° C), or trouble urinating.  Date Last Reviewed: 1/1/2017  © 5422-0984 [employer]. [address] [place] [address], [place] [address]. All rights reserved. This information is not intended as a substitute for professional medical care. Always follow your healthcare professional's instructions.

## 2019-08-30 LAB — BACTERIA UR CULT: NO GROWTH

## 2019-09-11 ENCOUNTER — TELEPHONE (OUTPATIENT)
Dept: OPHTHALMOLOGY | Facility: CLINIC | Age: 69
End: 2019-09-11

## 2019-09-11 ENCOUNTER — PROCEDURE VISIT (OUTPATIENT)
Dept: UROLOGY | Facility: CLINIC | Age: 69
End: 2019-09-11
Payer: MEDICARE

## 2019-09-11 VITALS
HEART RATE: 75 BPM | DIASTOLIC BLOOD PRESSURE: 74 MMHG | SYSTOLIC BLOOD PRESSURE: 125 MMHG | BODY MASS INDEX: 26.66 KG/M2 | HEIGHT: 65 IN | WEIGHT: 160 LBS | TEMPERATURE: 97 F

## 2019-09-11 DIAGNOSIS — N39.46 MIXED STRESS AND URGE URINARY INCONTINENCE: ICD-10-CM

## 2019-09-11 PROCEDURE — 51797 INTRAABDOMINAL PRESSURE TEST: CPT | Mod: 26,S$GLB,, | Performed by: UROLOGY

## 2019-09-11 PROCEDURE — 51741 PR UROFLOWMETRY, COMPLEX: ICD-10-PCS | Mod: 26,51,S$GLB, | Performed by: UROLOGY

## 2019-09-11 PROCEDURE — 51741 ELECTRO-UROFLOWMETRY FIRST: CPT | Mod: 26,51,S$GLB, | Performed by: UROLOGY

## 2019-09-11 PROCEDURE — 51797 PR VOIDING PRESS STUDY INTRA-ABDOMINAL VOID: ICD-10-PCS | Mod: 26,S$GLB,, | Performed by: UROLOGY

## 2019-09-11 PROCEDURE — 51784 PR ANAL/URINARY MUSCLE STUDY: ICD-10-PCS | Mod: 26,51,S$GLB, | Performed by: UROLOGY

## 2019-09-11 PROCEDURE — 51784 ANAL/URINARY MUSCLE STUDY: CPT | Mod: 26,51,S$GLB, | Performed by: UROLOGY

## 2019-09-11 PROCEDURE — 51728 PR COMPLEX CYSTOMETROGRAM VOIDING PRESSURE STUDIES: ICD-10-PCS | Mod: 26,S$GLB,, | Performed by: UROLOGY

## 2019-09-11 PROCEDURE — 52000 PR CYSTOURETHROSCOPY: ICD-10-PCS | Mod: 51,S$GLB,, | Performed by: UROLOGY

## 2019-09-11 PROCEDURE — 51728 CYSTOMETROGRAM W/VP: CPT | Mod: 26,S$GLB,, | Performed by: UROLOGY

## 2019-09-11 PROCEDURE — 52000 CYSTOURETHROSCOPY: CPT | Mod: 51,S$GLB,, | Performed by: UROLOGY

## 2019-09-11 RX ORDER — AMOXICILLIN 250 MG/1
500 CAPSULE ORAL ONCE
Status: COMPLETED | OUTPATIENT
Start: 2019-09-11 | End: 2019-09-11

## 2019-09-11 RX ORDER — LIDOCAINE HYDROCHLORIDE 20 MG/ML
JELLY TOPICAL ONCE
Status: COMPLETED | OUTPATIENT
Start: 2019-09-11 | End: 2019-09-11

## 2019-09-11 RX ADMIN — LIDOCAINE HYDROCHLORIDE: 20 JELLY TOPICAL at 11:09

## 2019-09-11 RX ADMIN — AMOXICILLIN 500 MG: 250 CAPSULE ORAL at 11:09

## 2019-09-11 NOTE — PATIENT INSTRUCTIONS
SIMPLE URODYNAMIC STUDY (SUDS) & CYSTOSCOPY  UROLOGY CLINIC DISCHARGE INSTRUCTIONS    You have had a procedure that will require time to properly heal. Follow the instructions you have been given on how to care for yourself once you are home. Below is additional information to help in your recovery.    ACTIVITY  · There are no restrictions in activity. Start doing again the things you did before the procedure.  · You may experience a slight burning sensation. You may notice a small amount of blood in your urine. This will clear up within a day. Call the clinic if this continues beyond 48 hours.    DIET  · Continue your normal diet. You may eat the same foods you ate before your procedure.  · Drink plenty of fluids during the first 24-48 hours following your procedure.    MEDICATIONS  · Resume all other previous medications from your prescribing physician.  · Continue any pre=procedure antibiotics until they are all gone.    SIGNS AND SYMPTOMS TO REPORT TO THE DOCTOR  · Chills or fever greater than 101° F within 24 hours of procedure.  · Changes in urination, such as increased bleeding, foul smell, cloudy urine, or painful urination.  · Call your doctor with any questions or concerns.    For any emergency situation, call 251 immediately or go to your nearest emergency room.    [facility]  [phone]    _                                                                                                                                                                                             If any problems after hours or weekends, you may call [phone] and ask for the urology resident on call.

## 2019-09-11 NOTE — PROCEDURES
Procedures     Urodynamic Report    Indication:  Urgency, frequency, urge predominant mixed incontinence in a patient who cannot tolerate antimuscarinics    Patient was taken to the Urodynamic Suite with a comfortably full bladder and asked to perform a free uroflow.  Next, the patient was prepped and the urinary residual was drained with a 14 Fr catheter.  A 7 Fr dual lumen catheter was placed to measure intravesical pressures.  A 10 Fr balloon manometer was placed into the rectum for abdominal pressure measurements.  Patch EMG electrodes were placed on the perineum.  The patient was connected to the [employer] Urodynamic machine, using a multichannel technique, the data were interpreted.  The bladder was filled with sterile water at room temperature at a rate of 30 ml/min.  Patient is filled to urgency.  Filling is performed with the patient in the seated position.  Abdominal leak point pressures are checked at 1st desire, then serially at 50cc increments first with Valsalva then with coughing.  The patient was then asked to sit and void for a pressure flow study.    The following are the results of the study:  1.  Uroflow       Q max:  45.5 ml/sec       Voided volume:  80.1 ml       Pattern of the curve:  continuous    2.  PVR:  5 ml    3.  CMG       Sensation:         First Desire:  74 ml         Normal Desire:  153 ml         Strong Desire:  211.8 ml         Urgency:  276 ml       Capacity:  275 +       Abnormal Contractions:  none       Compliance:  normal    4.  Abdominal Leak Point Pressure:       Valsalva:  96.8 cm H2O at 212.4 ml infused       Cough:  Did not leak    5.  EMG:  Normal guarding reflex, increased during voiding     6.  Voiding phase  1st   2nd       Q max:     54 ml/sec 44 ml/sec       P det at Q max:  36.6 cm H2O 46.5 cm H2O       Pattern of the curve: Intermittent continuous       Voided volume:  260 ml + 265 ml + (some lost on the floor)       PVR:   30 ml (by cath)10 ml (by cath)    7.   Analysis:  Increased sensation, decreased capacity, stress incontinence demonstrated, empties well    8.  Recommendations:       A.  Discussed options for tertiary therapies.  She would like to think about it.  The 4-6% of patients who need to self cath was discussed.  Brochures given for Botox, PTNS and InterStim and discussed the pros and cons of each.       B.  Follow up pending her decision regarding further treatment.           CYSTOSCOPY REPORT    Pre Procedure Diagnosis:  Urge predominant mixed incontinence    Post Procedure Diagnosis: normal lower urinary tract    Anesthesia: 10 cc 2% lidocaine jelly applied per urethra.    14 FR Flexible Olympus cystoscope used.    FINDINGS:  Dome, anterior, posterior, lateral walls and bladder base free of urothelial abnormalities. Right and left ureteral orifices in the normal postion and configuration, both effluxed clear urine.  Bladder neck and urethra were normal.    Specimen:  none    The patient was taken to the cystoscopy suite and placed in dorsal lithotomy position.  The genitalia was prepped and draped  in the usual sterile fashion.  Two percent lidocaine jelly was inserted in the urethra.  After sufficent time had passed to allow good local anesthesia, the cystoscope was inserted in the urethra and passed into the bladder visualizing the urethra along its entire course.  The dome, anterior, posterior and lateral walls were examined systematically.  The ureteral orifices were in their usual position and configuration.  The cystoscope was turned upon itself 180 degrees to visualize the bladder neck.  The cystoscope was then brought to the level of the bladder neck, the water was turned on and the urethra was visualized.  The cystoscope was removed and the patient was instructed to urinate prior to leaving the office.     Post procedure medication: amoxicillin 500 mg x 1     ASSESSMENT/PLAN:  69 year old woman status post flexible cystoscopy.  1. Push fluids for  24 hours.  2. May see blood in the urine, this should gradually improve over the next 2-3 days.  3. The patient was instructed to return to the office or go to the emergency should fever, chills, cloudy urine, or inability to urinate develop.  4. Follow up as above.

## 2019-09-24 DIAGNOSIS — I10 ESSENTIAL HYPERTENSION: ICD-10-CM

## 2019-09-25 RX ORDER — LOSARTAN POTASSIUM 100 MG/1
TABLET ORAL
Qty: 90 TABLET | Refills: 1 | Status: SHIPPED | OUTPATIENT
Start: 2019-09-25 | End: 2020-05-05 | Stop reason: SDUPTHER

## 2019-09-26 ENCOUNTER — PATIENT OUTREACH (OUTPATIENT)
Dept: ADMINISTRATIVE | Facility: HOSPITAL | Age: 69
End: 2019-09-26

## 2019-09-26 NOTE — PROGRESS NOTES
Pre-visit chart review completed.  Immunizations reviewed and updated.    Patient due for annual flu vaccine.

## 2019-10-06 DIAGNOSIS — F41.1 GAD (GENERALIZED ANXIETY DISORDER): ICD-10-CM

## 2019-10-06 DIAGNOSIS — F43.21 ADJUSTMENT DISORDER WITH DEPRESSED MOOD: ICD-10-CM

## 2019-10-07 RX ORDER — PAROXETINE HYDROCHLORIDE 40 MG/1
TABLET, FILM COATED ORAL
Qty: 90 TABLET | Refills: 1 | Status: SHIPPED | OUTPATIENT
Start: 2019-10-07 | End: 2020-05-05 | Stop reason: SDUPTHER

## 2019-10-10 ENCOUNTER — OFFICE VISIT (OUTPATIENT)
Dept: PRIMARY CARE CLINIC | Facility: CLINIC | Age: 69
End: 2019-10-10
Payer: MEDICARE

## 2019-10-10 ENCOUNTER — LAB VISIT (OUTPATIENT)
Dept: LAB | Facility: HOSPITAL | Age: 69
End: 2019-10-10
Attending: INTERNAL MEDICINE
Payer: MEDICARE

## 2019-10-10 ENCOUNTER — IMMUNIZATION (OUTPATIENT)
Dept: PHARMACY | Facility: CLINIC | Age: 69
End: 2019-10-10
Payer: MEDICARE

## 2019-10-10 VITALS
BODY MASS INDEX: 26.8 KG/M2 | WEIGHT: 160.88 LBS | HEIGHT: 65 IN | SYSTOLIC BLOOD PRESSURE: 128 MMHG | HEART RATE: 78 BPM | DIASTOLIC BLOOD PRESSURE: 80 MMHG

## 2019-10-10 DIAGNOSIS — R73.03 PRE-DIABETES: ICD-10-CM

## 2019-10-10 DIAGNOSIS — M89.9 DISORDER OF BONE AND CARTILAGE: ICD-10-CM

## 2019-10-10 DIAGNOSIS — F41.1 GAD (GENERALIZED ANXIETY DISORDER): ICD-10-CM

## 2019-10-10 DIAGNOSIS — I10 ESSENTIAL HYPERTENSION: ICD-10-CM

## 2019-10-10 DIAGNOSIS — M94.9 DISORDER OF BONE AND CARTILAGE: ICD-10-CM

## 2019-10-10 DIAGNOSIS — I10 ESSENTIAL HYPERTENSION: Primary | ICD-10-CM

## 2019-10-10 DIAGNOSIS — K21.9 GASTROESOPHAGEAL REFLUX DISEASE, ESOPHAGITIS PRESENCE NOT SPECIFIED: ICD-10-CM

## 2019-10-10 DIAGNOSIS — M15.9 PRIMARY OSTEOARTHRITIS INVOLVING MULTIPLE JOINTS: ICD-10-CM

## 2019-10-10 DIAGNOSIS — Z23 INFLUENZA VACCINE NEEDED: ICD-10-CM

## 2019-10-10 DIAGNOSIS — N95.1 MENOPAUSAL SYMPTOMS: ICD-10-CM

## 2019-10-10 DIAGNOSIS — Z12.31 ENCOUNTER FOR SCREENING MAMMOGRAM FOR BREAST CANCER: ICD-10-CM

## 2019-10-10 LAB
ALBUMIN SERPL BCP-MCNC: 3.8 G/DL (ref 3.5–5.2)
ALP SERPL-CCNC: 46 U/L (ref 55–135)
ALT SERPL W/O P-5'-P-CCNC: 17 U/L (ref 10–44)
ANION GAP SERPL CALC-SCNC: 7 MMOL/L (ref 8–16)
AST SERPL-CCNC: 27 U/L (ref 10–40)
BACTERIA #/AREA URNS AUTO: NORMAL /HPF
BASOPHILS # BLD AUTO: 0.02 K/UL (ref 0–0.2)
BASOPHILS NFR BLD: 0.4 % (ref 0–1.9)
BILIRUB SERPL-MCNC: 0.3 MG/DL (ref 0.1–1)
BILIRUB UR QL STRIP: NEGATIVE
BUN SERPL-MCNC: 26 MG/DL (ref 8–23)
CALCIUM SERPL-MCNC: 9.5 MG/DL (ref 8.7–10.5)
CHLORIDE SERPL-SCNC: 107 MMOL/L (ref 95–110)
CHOLEST SERPL-MCNC: 184 MG/DL (ref 120–199)
CHOLEST/HDLC SERPL: 2.9 {RATIO} (ref 2–5)
CLARITY UR REFRACT.AUTO: ABNORMAL
CO2 SERPL-SCNC: 27 MMOL/L (ref 23–29)
COLOR UR AUTO: YELLOW
CREAT SERPL-MCNC: 0.9 MG/DL (ref 0.5–1.4)
DIFFERENTIAL METHOD: NORMAL
EOSINOPHIL # BLD AUTO: 0.1 K/UL (ref 0–0.5)
EOSINOPHIL NFR BLD: 1.3 % (ref 0–8)
ERYTHROCYTE [DISTWIDTH] IN BLOOD BY AUTOMATED COUNT: 13.2 % (ref 11.5–14.5)
EST. GFR  (AFRICAN AMERICAN): >60 ML/MIN/1.73 M^2
EST. GFR  (NON AFRICAN AMERICAN): >60 ML/MIN/1.73 M^2
ESTIMATED AVG GLUCOSE: 117 MG/DL (ref 68–131)
GLUCOSE SERPL-MCNC: 76 MG/DL (ref 70–110)
GLUCOSE UR QL STRIP: NEGATIVE
HBA1C MFR BLD HPLC: 5.7 % (ref 4–5.6)
HCT VFR BLD AUTO: 38.7 % (ref 37–48.5)
HDLC SERPL-MCNC: 63 MG/DL (ref 40–75)
HDLC SERPL: 34.2 % (ref 20–50)
HGB BLD-MCNC: 12.5 G/DL (ref 12–16)
HGB UR QL STRIP: NEGATIVE
IMM GRANULOCYTES # BLD AUTO: 0.01 K/UL (ref 0–0.04)
IMM GRANULOCYTES NFR BLD AUTO: 0.2 % (ref 0–0.5)
KETONES UR QL STRIP: NEGATIVE
LDLC SERPL CALC-MCNC: 101.8 MG/DL (ref 63–159)
LEUKOCYTE ESTERASE UR QL STRIP: ABNORMAL
LYMPHOCYTES # BLD AUTO: 1.1 K/UL (ref 1–4.8)
LYMPHOCYTES NFR BLD: 24.6 % (ref 18–48)
MCH RBC QN AUTO: 30.6 PG (ref 27–31)
MCHC RBC AUTO-ENTMCNC: 32.3 G/DL (ref 32–36)
MCV RBC AUTO: 95 FL (ref 82–98)
MICROSCOPIC COMMENT: NORMAL
MONOCYTES # BLD AUTO: 0.4 K/UL (ref 0.3–1)
MONOCYTES NFR BLD: 8.4 % (ref 4–15)
NEUTROPHILS # BLD AUTO: 2.9 K/UL (ref 1.8–7.7)
NEUTROPHILS NFR BLD: 65.1 % (ref 38–73)
NITRITE UR QL STRIP: NEGATIVE
NONHDLC SERPL-MCNC: 121 MG/DL
NRBC BLD-RTO: 0 /100 WBC
PH UR STRIP: 5 [PH] (ref 5–8)
PLATELET # BLD AUTO: 216 K/UL (ref 150–350)
PMV BLD AUTO: 11.6 FL (ref 9.2–12.9)
POTASSIUM SERPL-SCNC: 4.5 MMOL/L (ref 3.5–5.1)
PROT SERPL-MCNC: 7.8 G/DL (ref 6–8.4)
PROT UR QL STRIP: NEGATIVE
RBC # BLD AUTO: 4.08 M/UL (ref 4–5.4)
RBC #/AREA URNS AUTO: 1 /HPF (ref 0–4)
SODIUM SERPL-SCNC: 141 MMOL/L (ref 136–145)
SP GR UR STRIP: 1.02 (ref 1–1.03)
SQUAMOUS #/AREA URNS AUTO: 0 /HPF
TRIGL SERPL-MCNC: 96 MG/DL (ref 30–150)
URN SPEC COLLECT METH UR: ABNORMAL
WBC # BLD AUTO: 4.52 K/UL (ref 3.9–12.7)
WBC #/AREA URNS AUTO: 1 /HPF (ref 0–5)

## 2019-10-10 PROCEDURE — 1101F PR PT FALLS ASSESS DOC 0-1 FALLS W/OUT INJ PAST YR: ICD-10-PCS | Mod: CPTII,S$GLB,, | Performed by: INTERNAL MEDICINE

## 2019-10-10 PROCEDURE — 81001 URINALYSIS AUTO W/SCOPE: CPT

## 2019-10-10 PROCEDURE — 83036 HEMOGLOBIN GLYCOSYLATED A1C: CPT

## 2019-10-10 PROCEDURE — 36415 COLL VENOUS BLD VENIPUNCTURE: CPT | Mod: PN

## 2019-10-10 PROCEDURE — 3079F DIAST BP 80-89 MM HG: CPT | Mod: CPTII,S$GLB,, | Performed by: INTERNAL MEDICINE

## 2019-10-10 PROCEDURE — 3074F SYST BP LT 130 MM HG: CPT | Mod: CPTII,S$GLB,, | Performed by: INTERNAL MEDICINE

## 2019-10-10 PROCEDURE — 3074F PR MOST RECENT SYSTOLIC BLOOD PRESSURE < 130 MM HG: ICD-10-PCS | Mod: CPTII,S$GLB,, | Performed by: INTERNAL MEDICINE

## 2019-10-10 PROCEDURE — 85025 COMPLETE CBC W/AUTO DIFF WBC: CPT

## 2019-10-10 PROCEDURE — 80061 LIPID PANEL: CPT

## 2019-10-10 PROCEDURE — 99999 PR PBB SHADOW E&M-EST. PATIENT-LVL III: ICD-10-PCS | Mod: PBBFAC,,, | Performed by: INTERNAL MEDICINE

## 2019-10-10 PROCEDURE — 99214 PR OFFICE/OUTPT VISIT, EST, LEVL IV, 30-39 MIN: ICD-10-PCS | Mod: S$GLB,,, | Performed by: INTERNAL MEDICINE

## 2019-10-10 PROCEDURE — 99214 OFFICE O/P EST MOD 30 MIN: CPT | Mod: S$GLB,,, | Performed by: INTERNAL MEDICINE

## 2019-10-10 PROCEDURE — 3079F PR MOST RECENT DIASTOLIC BLOOD PRESSURE 80-89 MM HG: ICD-10-PCS | Mod: CPTII,S$GLB,, | Performed by: INTERNAL MEDICINE

## 2019-10-10 PROCEDURE — 99499 UNLISTED E&M SERVICE: CPT | Mod: S$GLB,,, | Performed by: INTERNAL MEDICINE

## 2019-10-10 PROCEDURE — 80053 COMPREHEN METABOLIC PANEL: CPT

## 2019-10-10 PROCEDURE — 99999 PR PBB SHADOW E&M-EST. PATIENT-LVL III: CPT | Mod: PBBFAC,,, | Performed by: INTERNAL MEDICINE

## 2019-10-10 PROCEDURE — 1101F PT FALLS ASSESS-DOCD LE1/YR: CPT | Mod: CPTII,S$GLB,, | Performed by: INTERNAL MEDICINE

## 2019-10-10 PROCEDURE — 99499 RISK ADDL DX/OHS AUDIT: ICD-10-PCS | Mod: S$GLB,,, | Performed by: INTERNAL MEDICINE

## 2019-10-10 RX ORDER — ALPRAZOLAM 2 MG/1
2 TABLET ORAL 2 TIMES DAILY PRN
Qty: 60 TABLET | Refills: 5 | Status: SHIPPED | OUTPATIENT
Start: 2019-10-10 | End: 2020-04-09

## 2019-10-10 RX ORDER — MELOXICAM 15 MG/1
15 TABLET ORAL DAILY PRN
Qty: 90 TABLET | Refills: 3 | Status: SHIPPED | OUTPATIENT
Start: 2019-10-10 | End: 2019-11-20 | Stop reason: SDUPTHER

## 2019-10-10 RX ORDER — AMLODIPINE BESYLATE 10 MG/1
10 TABLET ORAL DAILY
Qty: 90 TABLET | Refills: 3 | Status: SHIPPED | OUTPATIENT
Start: 2019-10-10 | End: 2020-03-18 | Stop reason: SDUPTHER

## 2019-10-10 RX ORDER — ESTRADIOL 1 MG/1
1 TABLET ORAL DAILY
Qty: 90 TABLET | Refills: 3 | Status: SHIPPED | OUTPATIENT
Start: 2019-10-10 | End: 2020-01-20 | Stop reason: SDUPTHER

## 2019-10-10 NOTE — PROGRESS NOTES
Subjective:       Patient ID: Nicole Jacques is a 69 y.o. female.    Chief Complaint: Hypertension    Last seen 6 months ago for f/u chronic medical conditions. Returns for annual exam. Feeling well, no new complaints. Current meds are working for her, no adverse effects. She would like to continue them all.     PMH: , misc x 1.    Hypertension.  Pre-Diabetes, HbA1c up to 6.3%.  GERD, EGD  only showed hiatal hernia.  Depression with Anxiety.    Lumbar Spondylosis.  Mild OA Knees.   Sigmoid Diverticulosis.     Colonoscopy 11/15 normal. Mammogram normal . Pelvic exam 10/16. BMD normal 12/15. Eye exam . EKG normal . Pneumovax . Prevnar 8/15. Flu shot 10/18. Shingrix , . Labs 10/18: CBC normal, CMP normal, HbA1c 5.7%, Vit D 43, TChol 196, TG 99, HDL 69, , Urinalysis clear.    SOCIAL: No tobacco,  smokes. Social alcohol. No illicit drug usage.  with 2 adult children who live locally. 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     PSH: BTL, Hysterectomy due to heavy menses, ovaries remain. Left knee arthroscopic surgery due to injury kick boxing. Appendectomy. Bilateral Cataract extractions and Blepharoplasty.     FMH: Father  age 81 with bladder cancer and dementia. Mother  age 78 with hypertension and renal failure, and CHF, possible osteoporosis. Diabetes in grandmothers. Anxiety - mother, sister. Colon cancer in maternal uncle.    Allergies: Bactrim.    Medications: list reviewed and reconciled.             Review of Systems   Constitutional: Negative for activity change, appetite change, fatigue, fever and unexpected weight change.        Low carb diet.    HENT: Negative for congestion, hearing loss, rhinorrhea, sneezing, sore throat, trouble swallowing and voice change.    Eyes: Negative for pain and visual disturbance.   Respiratory: Negative for cough, chest tightness, shortness of breath and wheezing.    Cardiovascular: Negative for chest pain,  "palpitations and leg swelling.   Gastrointestinal: Negative for abdominal pain, blood in stool, constipation, diarrhea, nausea and vomiting.   Genitourinary: Negative for difficulty urinating, dysuria, flank pain, frequency, hematuria and urgency.   Musculoskeletal: Negative for arthralgias, back pain, joint swelling, myalgias and neck pain.   Skin: Negative for color change and rash.   Neurological: Positive for tremors. Negative for dizziness, syncope, facial asymmetry, speech difficulty, weakness, numbness and headaches.   Hematological: Negative for adenopathy. Does not bruise/bleed easily.   Psychiatric/Behavioral: Negative for agitation, dysphoric mood and sleep disturbance. The patient is not nervous/anxious.        Objective:    /80, Pulse 78, Ht 5' 5", Wt 161 lbs, BMI=27  Physical Exam   Constitutional: She is oriented to person, place, and time. She appears well-developed and well-nourished. No distress.   HENT:   Head: Normocephalic and atraumatic.   Right Ear: External ear normal.   Left Ear: External ear normal.   Nose: Nose normal.   Mouth/Throat: Oropharynx is clear and moist. No oropharyngeal exudate.   Eyes: Pupils are equal, round, and reactive to light. Conjunctivae and EOM are normal. Right conjunctiva is not injected. Left conjunctiva is not injected. No scleral icterus.   Neck: Normal range of motion. Neck supple. No JVD present. Carotid bruit is not present. No thyromegaly present.   Cardiovascular: Normal rate, regular rhythm, normal heart sounds and intact distal pulses. Exam reveals no gallop and no friction rub.   No murmur heard.  Pulmonary/Chest: Effort normal and breath sounds normal. No respiratory distress. She has no wheezes. She has no rhonchi. She has no rales.   Abdominal: Soft. Bowel sounds are normal. She exhibits no distension and no mass. There is no hepatosplenomegaly. There is no tenderness. There is no CVA tenderness.   Musculoskeletal: Normal range of motion. She " exhibits no edema, tenderness or deformity.   Lymphadenopathy:     She has no cervical adenopathy.   Neurological: She is alert and oriented to person, place, and time. She has normal strength and normal reflexes. No cranial nerve deficit. She exhibits normal muscle tone. Coordination and gait normal.   Skin: Skin is warm and dry. No lesion and no rash noted. She is not diaphoretic. No cyanosis or erythema. No pallor. Nails show no clubbing.   Psychiatric: She has a normal mood and affect. Her behavior is normal. Judgment and thought content normal.   Vitals reviewed.      Assessment:       1. Essential hypertension    2. Pre-diabetes    3. Encounter for screening mammogram for breast cancer    4. Disorder of bone and cartilage    5. Gastroesophageal reflux disease, esophagitis presence not specified    6. ROSALIA (generalized anxiety disorder)    7. Menopausal symptoms    8. Primary osteoarthritis involving multiple joints    9. Influenza vaccine needed        Plan:       Essential hypertension controlled, continue same.   -     CBC auto differential; Future; Expected date: 10/10/2019  -     Comprehensive metabolic panel; Future; Expected date: 10/10/2019  -     Lipid panel; Future; Expected date: 10/10/2019  -     Urinalysis  -     amLODIPine (NORVASC) 10 MG tablet; Take 1 tablet (10 mg total) by mouth once daily.  Dispense: 90 tablet; Refill: 3    Pre-diabetes  -     Hemoglobin A1c; Future; Expected date: 10/10/2019    Encounter for screening mammogram for breast cancer  -     Mammo Digital Screening Bilat w/ Tristan; Future; Expected date: 10/10/2019    Disorder of bone and cartilage  -     DXA Bone Density Spine And Hip; Future; Expected date: 10/10/2019    Gastroesophageal reflux disease, esophagitis presence not specified - stable on Omeprazole.     ROSALIA (generalized anxiety disorder)  -     ALPRAZolam (XANAX) 2 MG Tab; Take 1 tablet (2 mg total) by mouth 2 (two) times daily as needed.  Dispense: 60 tablet; Refill:  5    Menopausal symptoms  -     estradiol (ESTRACE) 1 MG tablet; Take 1 tablet (1 mg total) by mouth once daily.  Dispense: 90 tablet; Refill: 3    Primary osteoarthritis involving multiple joints  -     meloxicam (MOBIC) 15 MG tablet; Take 1 tablet (15 mg total) by mouth daily as needed for Pain.  Dispense: 90 tablet; Refill: 3    Influenza vaccine needed - Flu shot today.

## 2019-10-11 ENCOUNTER — PATIENT MESSAGE (OUTPATIENT)
Dept: PRIMARY CARE CLINIC | Facility: CLINIC | Age: 69
End: 2019-10-11

## 2019-11-09 DIAGNOSIS — F41.1 GAD (GENERALIZED ANXIETY DISORDER): ICD-10-CM

## 2019-11-10 DIAGNOSIS — F41.1 GAD (GENERALIZED ANXIETY DISORDER): ICD-10-CM

## 2019-11-10 RX ORDER — ALPRAZOLAM 2 MG/1
TABLET ORAL
Qty: 60 TABLET | Refills: 2 | OUTPATIENT
Start: 2019-11-10

## 2019-11-11 NOTE — TELEPHONE ENCOUNTER
Pharmacy has requested Alprazolam twice (denied), this was ordered electronically for 6 refills 10/10/19 and will not be duplicated.

## 2019-11-19 ENCOUNTER — TELEPHONE (OUTPATIENT)
Dept: PRIMARY CARE CLINIC | Facility: CLINIC | Age: 69
End: 2019-11-19

## 2019-11-19 DIAGNOSIS — M15.9 PRIMARY OSTEOARTHRITIS INVOLVING MULTIPLE JOINTS: ICD-10-CM

## 2019-11-19 NOTE — TELEPHONE ENCOUNTER
----- Message from Herminia Dodd sent at 11/19/2019  1:29 PM CST -----  Contact: self/[phone]  Pt called in regards to her Rx for     meloxicam (MOBIC) 15 MG tablet 90 tablet 3 10/10/2019  No Take 1 tablet (15 mg total) by mouth daily as needed for Pain.    Was sent to the wrong pharmacy. She said all her 90 day Rx needs to go to [facility] and  It needs to go to. She would like it done ASAP due to she is out of the medication.     [facility] [phone]   Please advise

## 2019-11-20 RX ORDER — MELOXICAM 15 MG/1
15 TABLET ORAL DAILY PRN
Qty: 90 TABLET | Refills: 3 | Status: SHIPPED | OUTPATIENT
Start: 2019-11-20 | End: 2020-09-14

## 2019-12-18 ENCOUNTER — HOSPITAL ENCOUNTER (OUTPATIENT)
Dept: RADIOLOGY | Facility: HOSPITAL | Age: 69
Discharge: HOME | End: 2019-12-18
Attending: INTERNAL MEDICINE
Payer: MEDICARE

## 2019-12-18 DIAGNOSIS — Z12.31 ENCOUNTER FOR SCREENING MAMMOGRAM FOR BREAST CANCER: ICD-10-CM

## 2019-12-18 PROCEDURE — 77063 MAMMO DIGITAL SCREENING BILAT WITH TOMOSYNTHESIS_CAD: ICD-10-PCS | Mod: 26,,, | Performed by: RADIOLOGY

## 2019-12-18 PROCEDURE — 77067 SCR MAMMO BI INCL CAD: CPT | Mod: 26,,, | Performed by: RADIOLOGY

## 2019-12-18 PROCEDURE — 77063 BREAST TOMOSYNTHESIS BI: CPT | Mod: 26,,, | Performed by: RADIOLOGY

## 2019-12-18 PROCEDURE — 77067 SCR MAMMO BI INCL CAD: CPT | Mod: TC

## 2019-12-18 PROCEDURE — 77067 MAMMO DIGITAL SCREENING BILAT WITH TOMOSYNTHESIS_CAD: ICD-10-PCS | Mod: 26,,, | Performed by: RADIOLOGY

## 2019-12-29 ENCOUNTER — PATIENT MESSAGE (OUTPATIENT)
Dept: PRIMARY CARE CLINIC | Facility: CLINIC | Age: 69
End: 2019-12-29

## 2020-01-20 ENCOUNTER — TELEPHONE (OUTPATIENT)
Dept: PRIMARY CARE CLINIC | Facility: CLINIC | Age: 70
End: 2020-01-20

## 2020-01-20 DIAGNOSIS — N95.1 MENOPAUSAL SYMPTOMS: ICD-10-CM

## 2020-01-20 NOTE — TELEPHONE ENCOUNTER
----- Message from Gallito Casillas sent at 1/20/2020 10:19 AM CST -----  Contact: self   Patient called in regards to now using [facility] 90 day supply there # is  Patient states she will use then now please advise.

## 2020-01-20 NOTE — TELEPHONE ENCOUNTER
----- Message from Elvira Richardson sent at 1/20/2020 10:08 AM CST -----  Contact: OSIEL HOPE [[id number]]  Type: RX Refill Request    Who Called: OSIEL HOPE [[id number]]    RX Name and Strength: estradiol (ESTRACE) 1 MG tablet (90 days supply)    Preferred Pharmacy with phone number: [facility] [phone] ([facility])    Best Call Back Number: [phone]    Additional Information: N/A

## 2020-01-21 RX ORDER — ESTRADIOL 1 MG/1
1 TABLET ORAL DAILY
Qty: 90 TABLET | Refills: 3 | Status: SHIPPED | OUTPATIENT
Start: 2020-01-21 | End: 2020-08-14 | Stop reason: SDUPTHER

## 2020-02-07 ENCOUNTER — TELEPHONE (OUTPATIENT)
Dept: PRIMARY CARE CLINIC | Facility: CLINIC | Age: 70
End: 2020-02-07

## 2020-02-07 NOTE — TELEPHONE ENCOUNTER
----- Message from Zulema Alanis sent at 2/7/2020  9:44 AM CST -----  Contact: Self [phone]  Patient is calling to inform the nurse in regards to new mail pharmacy procedure with refills, please advise

## 2020-03-06 ENCOUNTER — TELEPHONE (OUTPATIENT)
Dept: OBSTETRICS AND GYNECOLOGY | Facility: CLINIC | Age: 70
End: 2020-03-06

## 2020-03-06 NOTE — TELEPHONE ENCOUNTER
----- Message from Vu White sent at 3/6/2020  1:44 PM CST -----  Please call pt she will like to get a rx for [facility] pharmacy on file

## 2020-03-06 NOTE — TELEPHONE ENCOUNTER
Informed patient that prescription has refills on it and to please call the pharmacy. Patient has no further questions or complaints.

## 2020-03-18 DIAGNOSIS — I10 ESSENTIAL HYPERTENSION: ICD-10-CM

## 2020-03-18 RX ORDER — AMLODIPINE BESYLATE 10 MG/1
10 TABLET ORAL DAILY
Qty: 90 TABLET | Refills: 1 | Status: SHIPPED | OUTPATIENT
Start: 2020-03-18 | End: 2020-07-29

## 2020-03-20 DIAGNOSIS — K21.9 GASTROESOPHAGEAL REFLUX DISEASE, ESOPHAGITIS PRESENCE NOT SPECIFIED: ICD-10-CM

## 2020-03-20 RX ORDER — OMEPRAZOLE 20 MG/1
20 CAPSULE, DELAYED RELEASE ORAL DAILY
Qty: 90 CAPSULE | Refills: 1 | Status: SHIPPED | OUTPATIENT
Start: 2020-03-20 | End: 2020-07-29

## 2020-04-09 ENCOUNTER — TELEPHONE (OUTPATIENT)
Dept: OPHTHALMOLOGY | Facility: CLINIC | Age: 70
End: 2020-04-09

## 2020-04-09 DIAGNOSIS — F41.1 GAD (GENERALIZED ANXIETY DISORDER): ICD-10-CM

## 2020-04-09 RX ORDER — ALPRAZOLAM 2 MG/1
TABLET ORAL
Qty: 60 TABLET | Refills: 5 | Status: SHIPPED | OUTPATIENT
Start: 2020-04-09 | End: 2020-10-10

## 2020-05-05 DIAGNOSIS — F43.21 ADJUSTMENT DISORDER WITH DEPRESSED MOOD: ICD-10-CM

## 2020-05-05 DIAGNOSIS — F41.1 GAD (GENERALIZED ANXIETY DISORDER): ICD-10-CM

## 2020-05-05 DIAGNOSIS — I10 ESSENTIAL HYPERTENSION: ICD-10-CM

## 2020-05-05 RX ORDER — PAROXETINE HYDROCHLORIDE 40 MG/1
40 TABLET, FILM COATED ORAL DAILY
Qty: 90 TABLET | Refills: 1 | Status: SHIPPED | OUTPATIENT
Start: 2020-05-05 | End: 2020-09-14

## 2020-05-05 RX ORDER — LOSARTAN POTASSIUM 100 MG/1
100 TABLET ORAL DAILY
Qty: 90 TABLET | Refills: 1 | Status: SHIPPED | OUTPATIENT
Start: 2020-05-05 | End: 2020-09-14

## 2020-05-11 ENCOUNTER — TELEPHONE (OUTPATIENT)
Dept: OBSTETRICS AND GYNECOLOGY | Facility: CLINIC | Age: 70
End: 2020-05-11

## 2020-05-11 NOTE — TELEPHONE ENCOUNTER
Pt called to schedule her annual appointment wit Dr Black before she leaves. Advised pt she is due after 7/3/20 and Dr Black's last day is May 28th. Pt states she would like a message sent to Dr Black so she can let her know who she recommends pt to see. Advised pt will send her a message

## 2020-05-21 ENCOUNTER — LAB VISIT (OUTPATIENT)
Dept: LAB | Facility: HOSPITAL | Age: 70
End: 2020-05-21
Attending: INTERNAL MEDICINE
Payer: MEDICARE

## 2020-05-21 ENCOUNTER — OFFICE VISIT (OUTPATIENT)
Dept: PRIMARY CARE CLINIC | Facility: CLINIC | Age: 70
End: 2020-05-21
Payer: MEDICARE

## 2020-05-21 VITALS
SYSTOLIC BLOOD PRESSURE: 124 MMHG | HEIGHT: 65 IN | TEMPERATURE: 98 F | OXYGEN SATURATION: 98 % | HEART RATE: 81 BPM | WEIGHT: 163.13 LBS | BODY MASS INDEX: 27.18 KG/M2 | DIASTOLIC BLOOD PRESSURE: 84 MMHG

## 2020-05-21 DIAGNOSIS — M94.9 DISORDER OF BONE AND CARTILAGE: ICD-10-CM

## 2020-05-21 DIAGNOSIS — Z11.59 SCREENING FOR VIRAL DISEASE: ICD-10-CM

## 2020-05-21 DIAGNOSIS — R73.03 PRE-DIABETES: ICD-10-CM

## 2020-05-21 DIAGNOSIS — M89.9 DISORDER OF BONE AND CARTILAGE: ICD-10-CM

## 2020-05-21 DIAGNOSIS — I10 ESSENTIAL HYPERTENSION: Primary | ICD-10-CM

## 2020-05-21 DIAGNOSIS — I10 ESSENTIAL HYPERTENSION: ICD-10-CM

## 2020-05-21 PROCEDURE — 99999 PR PBB SHADOW E&M-EST. PATIENT-LVL III: CPT | Mod: PBBFAC,,, | Performed by: INTERNAL MEDICINE

## 2020-05-21 PROCEDURE — 1126F PR PAIN SEVERITY QUANTIFIED, NO PAIN PRESENT: ICD-10-PCS | Mod: S$GLB,,, | Performed by: INTERNAL MEDICINE

## 2020-05-21 PROCEDURE — 1101F PR PT FALLS ASSESS DOC 0-1 FALLS W/OUT INJ PAST YR: ICD-10-PCS | Mod: CPTII,S$GLB,, | Performed by: INTERNAL MEDICINE

## 2020-05-21 PROCEDURE — 3074F SYST BP LT 130 MM HG: CPT | Mod: CPTII,S$GLB,, | Performed by: INTERNAL MEDICINE

## 2020-05-21 PROCEDURE — 99999 PR PBB SHADOW E&M-EST. PATIENT-LVL III: ICD-10-PCS | Mod: PBBFAC,,, | Performed by: INTERNAL MEDICINE

## 2020-05-21 PROCEDURE — 99499 RISK ADDL DX/OHS AUDIT: ICD-10-PCS | Mod: S$GLB,,, | Performed by: INTERNAL MEDICINE

## 2020-05-21 PROCEDURE — 86769 SARS-COV-2 COVID-19 ANTIBODY: CPT

## 2020-05-21 PROCEDURE — 83036 HEMOGLOBIN GLYCOSYLATED A1C: CPT

## 2020-05-21 PROCEDURE — 99499 UNLISTED E&M SERVICE: CPT | Mod: S$GLB,,, | Performed by: INTERNAL MEDICINE

## 2020-05-21 PROCEDURE — 80048 BASIC METABOLIC PNL TOTAL CA: CPT

## 2020-05-21 PROCEDURE — 3079F PR MOST RECENT DIASTOLIC BLOOD PRESSURE 80-89 MM HG: ICD-10-PCS | Mod: CPTII,S$GLB,, | Performed by: INTERNAL MEDICINE

## 2020-05-21 PROCEDURE — 1159F MED LIST DOCD IN RCRD: CPT | Mod: S$GLB,,, | Performed by: INTERNAL MEDICINE

## 2020-05-21 PROCEDURE — 1101F PT FALLS ASSESS-DOCD LE1/YR: CPT | Mod: CPTII,S$GLB,, | Performed by: INTERNAL MEDICINE

## 2020-05-21 PROCEDURE — 1159F PR MEDICATION LIST DOCUMENTED IN MEDICAL RECORD: ICD-10-PCS | Mod: S$GLB,,, | Performed by: INTERNAL MEDICINE

## 2020-05-21 PROCEDURE — 3079F DIAST BP 80-89 MM HG: CPT | Mod: CPTII,S$GLB,, | Performed by: INTERNAL MEDICINE

## 2020-05-21 PROCEDURE — 1126F AMNT PAIN NOTED NONE PRSNT: CPT | Mod: S$GLB,,, | Performed by: INTERNAL MEDICINE

## 2020-05-21 PROCEDURE — 99214 PR OFFICE/OUTPT VISIT, EST, LEVL IV, 30-39 MIN: ICD-10-PCS | Mod: S$GLB,,, | Performed by: INTERNAL MEDICINE

## 2020-05-21 PROCEDURE — 99214 OFFICE O/P EST MOD 30 MIN: CPT | Mod: S$GLB,,, | Performed by: INTERNAL MEDICINE

## 2020-05-21 PROCEDURE — 36415 COLL VENOUS BLD VENIPUNCTURE: CPT | Mod: PN

## 2020-05-21 PROCEDURE — 3074F PR MOST RECENT SYSTOLIC BLOOD PRESSURE < 130 MM HG: ICD-10-PCS | Mod: CPTII,S$GLB,, | Performed by: INTERNAL MEDICINE

## 2020-05-21 NOTE — PROGRESS NOTES
Subjective:       Patient ID: Nicole Jacques is a 70 y.o. female.    Chief Complaint: Follow-up    Last seen 7 months ago for annual exam doing well. Returns for f/u hypertension reporting BP at home checked about twice a month ranges 120's/70's. Compliant with meds as prescribed, no adverse effects. Uses Meloxicam intermittently for chronic low back pain.     PMH: , misc x 1.    Hypertension.  Pre-Diabetes, HbA1c up to 6.3%.  GERD, EGD  only showed hiatal hernia.  Depression with Anxiety.    Lumbar Spondylosis.  Mild OA Knees.   Sigmoid Diverticulosis.     Colonoscopy 11/15 normal. Mammogram normal . Pelvic exam 10/16. BMD normal 12/15 - ordered, not done. Eye exam . EKG normal . Pneumovax . Prevnar 8/15. Flu shot 10/19. Shingrix , . Labs 10/19: CBC normal, CMP normal, HbA1c 5.7%, TChol 184, TG 96, HDL 63, .     SOCIAL: No tobacco,  smokes. Social alcohol.  with 2 children. Retired  at [employer] and [employer] store.     PSH: BTL, Hysterectomy due to heavy menses, ovaries remain. Left knee arthroscopic surgery due to injury kick boxing. Appendectomy. Bilateral Cataract extractions and Blepharoplasty.     FMH: Father  age 81 with bladder cancer and dementia. Mother  age 78 with hypertension and renal failure, and CHF, possible osteoporosis. Diabetes in grandmothers. Anxiety - mother, sister. Colon cancer in maternal uncle.    Allergies: Bactrim.    Medications: list reviewed and reconciled.             Review of Systems   Constitutional: Negative for chills, diaphoresis, fever and unexpected weight change.        Continues to follow a healthy diet and exercise regularly.    Respiratory: Negative for cough and shortness of breath.    Cardiovascular: Negative for chest pain, palpitations and leg swelling.   Gastrointestinal: Negative for abdominal pain, diarrhea, nausea and vomiting.   Genitourinary: Negative for dysuria and frequency.    Musculoskeletal: Positive for back pain. Negative for gait problem.   Skin: Negative for rash and wound.   Neurological: Negative for dizziness, syncope and headaches.   Psychiatric/Behavioral: Negative for dysphoric mood. The patient is nervous/anxious.        Objective:    /84, Pulse 81, Temp 98, O2 Sat 98%, Wt 163 lbs (from 161), BMI=27  Physical Exam   Constitutional: She is oriented to person, place, and time. She appears well-nourished. No distress.   Eyes: Conjunctivae and EOM are normal.   Neck: Normal range of motion. Neck supple. No JVD present.   Cardiovascular: Normal rate, regular rhythm and normal heart sounds.   Pulmonary/Chest: Effort normal and breath sounds normal. No respiratory distress. She has no wheezes. She has no rales.   Musculoskeletal: Normal range of motion. She exhibits no edema.   Neurological: She is alert and oriented to person, place, and time. No cranial nerve deficit. Coordination normal.   Skin: Skin is warm and dry.   Psychiatric: She has a normal mood and affect. Her behavior is normal. Judgment and thought content normal.       Assessment:       1. Essential hypertension    2. Pre-diabetes    3. Disorder of bone and cartilage    4. Screening for viral disease        Plan:       Essential hypertension controlled, continue Amlodipine and Losartan.   -     Basic metabolic panel; Future; Expected date: 05/21/2020    Pre-diabetes  -     Hemoglobin A1C; Future; Expected date: 05/21/2020  -     Continue diet and exercise.    Disorder of bone and cartilage - Bone Density test to be rescheduled.    Screening for viral disease  -     COVID-19 (SARS CoV-2) IgG Antibody; Future; Expected date: 05/21/2020    Labs today, return for annual exam in 6 months.

## 2020-05-22 ENCOUNTER — PATIENT MESSAGE (OUTPATIENT)
Dept: PRIMARY CARE CLINIC | Facility: CLINIC | Age: 70
End: 2020-05-22

## 2020-05-22 LAB
ANION GAP SERPL CALC-SCNC: 9 MMOL/L (ref 8–16)
BUN SERPL-MCNC: 21 MG/DL (ref 8–23)
CALCIUM SERPL-MCNC: 8.7 MG/DL (ref 8.7–10.5)
CHLORIDE SERPL-SCNC: 105 MMOL/L (ref 95–110)
CO2 SERPL-SCNC: 26 MMOL/L (ref 23–29)
CREAT SERPL-MCNC: 0.9 MG/DL (ref 0.5–1.4)
EST. GFR  (AFRICAN AMERICAN): >60 ML/MIN/1.73 M^2
EST. GFR  (NON AFRICAN AMERICAN): >60 ML/MIN/1.73 M^2
ESTIMATED AVG GLUCOSE: 120 MG/DL (ref 68–131)
GLUCOSE SERPL-MCNC: 86 MG/DL (ref 70–110)
HBA1C MFR BLD HPLC: 5.8 % (ref 4–5.6)
POTASSIUM SERPL-SCNC: 4.1 MMOL/L (ref 3.5–5.1)
SARS-COV-2 IGG SERPLBLD QL IA.RAPID: NEGATIVE
SODIUM SERPL-SCNC: 140 MMOL/L (ref 136–145)

## 2020-07-22 ENCOUNTER — TELEPHONE (OUTPATIENT)
Dept: NEUROLOGY | Facility: CLINIC | Age: 70
End: 2020-07-22

## 2020-07-22 ENCOUNTER — TELEPHONE (OUTPATIENT)
Dept: PRIMARY CARE CLINIC | Facility: CLINIC | Age: 70
End: 2020-07-22

## 2020-07-22 DIAGNOSIS — M15.9 PRIMARY OSTEOARTHRITIS INVOLVING MULTIPLE JOINTS: ICD-10-CM

## 2020-07-22 DIAGNOSIS — Z78.0 ASYMPTOMATIC MENOPAUSAL STATE: ICD-10-CM

## 2020-07-22 DIAGNOSIS — M94.9 DISORDER OF BONE AND CARTILAGE: Primary | ICD-10-CM

## 2020-07-22 DIAGNOSIS — M89.9 DISORDER OF BONE AND CARTILAGE: Primary | ICD-10-CM

## 2020-07-22 NOTE — TELEPHONE ENCOUNTER
Made an appt.           ----- Message from Bruno Turner sent at 7/22/2020  3:16 PM CDT -----  Regarding: Appointment  Contact: Patient  Type: Patient Call Back    Who called: Patient    What is the request in detail: She is requesting a call to discuss new pt appt and tremors.    Can the clinic reply by [facility]?  No    Would the patient rather a call back or a response via [facility]? Call    Best call back number: [phone] (home)

## 2020-07-22 NOTE — TELEPHONE ENCOUNTER
----- Message from Shanika Ochoa sent at 7/22/2020  2:11 PM CDT -----  Contact: patient  Patient called and wanted to schedule a bone density test but the order she has it won't let us schedule it saying it needs to be new orders to put in because the insurance won't cover it because it has 2019 on the order     Patient would like a call back please at [phone]

## 2020-07-28 ENCOUNTER — HOSPITAL ENCOUNTER (OUTPATIENT)
Dept: RADIOLOGY | Facility: HOSPITAL | Age: 70
Discharge: HOME | End: 2020-07-28
Attending: INTERNAL MEDICINE
Payer: MEDICARE

## 2020-07-28 DIAGNOSIS — Z78.0 ASYMPTOMATIC MENOPAUSAL STATE: ICD-10-CM

## 2020-07-28 PROCEDURE — 77080 DXA BONE DENSITY AXIAL: CPT | Mod: TC,PO

## 2020-08-03 ENCOUNTER — LAB VISIT (OUTPATIENT)
Dept: PRIMARY CARE CLINIC | Facility: OTHER | Age: 70
End: 2020-08-03
Attending: INTERNAL MEDICINE
Payer: MEDICARE

## 2020-08-03 DIAGNOSIS — Z03.818 ENCOUNTER FOR OBSERVATION FOR SUSPECTED EXPOSURE TO OTHER BIOLOGICAL AGENTS RULED OUT: ICD-10-CM

## 2020-08-03 PROCEDURE — U0003 INFECTIOUS AGENT DETECTION BY NUCLEIC ACID (DNA OR RNA); SEVERE ACUTE RESPIRATORY SYNDROME CORONAVIRUS 2 (SARS-COV-2) (CORONAVIRUS DISEASE [COVID-19]), AMPLIFIED PROBE TECHNIQUE, MAKING USE OF HIGH THROUGHPUT TECHNOLOGIES AS DESCRIBED BY CMS-2020-01-R: HCPCS

## 2020-08-04 LAB — SARS-COV-2 RNA RESP QL NAA+PROBE: NOT DETECTED

## 2020-08-13 ENCOUNTER — TELEPHONE (OUTPATIENT)
Dept: UROLOGY | Facility: CLINIC | Age: 70
End: 2020-08-13

## 2020-08-13 NOTE — TELEPHONE ENCOUNTER
----- Message from Keya Garcia sent at 8/13/2020  8:12 AM CDT -----  Regarding: Referral  Pt is requesting a referral based on a visit from last year.    Pt  can be advised at [phone]      Thank You  Keya Galeas

## 2020-08-14 ENCOUNTER — OFFICE VISIT (OUTPATIENT)
Dept: OBSTETRICS AND GYNECOLOGY | Facility: CLINIC | Age: 70
End: 2020-08-14
Payer: MEDICARE

## 2020-08-14 VITALS
HEIGHT: 65 IN | SYSTOLIC BLOOD PRESSURE: 122 MMHG | DIASTOLIC BLOOD PRESSURE: 64 MMHG | BODY MASS INDEX: 27.58 KG/M2 | WEIGHT: 165.56 LBS

## 2020-08-14 DIAGNOSIS — N95.1 MENOPAUSAL SYMPTOMS: ICD-10-CM

## 2020-08-14 DIAGNOSIS — N95.2 POSTMENOPAUSAL ATROPHIC VAGINITIS: ICD-10-CM

## 2020-08-14 DIAGNOSIS — N89.8 VAGINAL DISCHARGE: ICD-10-CM

## 2020-08-14 DIAGNOSIS — Z12.31 SCREENING MAMMOGRAM, ENCOUNTER FOR: ICD-10-CM

## 2020-08-14 DIAGNOSIS — Z01.419 ENCOUNTER FOR GYNECOLOGICAL EXAMINATION WITHOUT ABNORMAL FINDING: Primary | ICD-10-CM

## 2020-08-14 PROCEDURE — 3078F PR MOST RECENT DIASTOLIC BLOOD PRESSURE < 80 MM HG: ICD-10-PCS | Mod: CPTII,S$GLB,, | Performed by: OBSTETRICS & GYNECOLOGY

## 2020-08-14 PROCEDURE — 99999 PR PBB SHADOW E&M-EST. PATIENT-LVL III: CPT | Mod: PBBFAC,,, | Performed by: OBSTETRICS & GYNECOLOGY

## 2020-08-14 PROCEDURE — G0101 PR CA SCREEN;PELVIC/BREAST EXAM: ICD-10-PCS | Mod: S$GLB,,, | Performed by: OBSTETRICS & GYNECOLOGY

## 2020-08-14 PROCEDURE — G0101 CA SCREEN;PELVIC/BREAST EXAM: HCPCS | Mod: S$GLB,,, | Performed by: OBSTETRICS & GYNECOLOGY

## 2020-08-14 PROCEDURE — 99999 PR PBB SHADOW E&M-EST. PATIENT-LVL III: ICD-10-PCS | Mod: PBBFAC,,, | Performed by: OBSTETRICS & GYNECOLOGY

## 2020-08-14 PROCEDURE — 3074F SYST BP LT 130 MM HG: CPT | Mod: CPTII,S$GLB,, | Performed by: OBSTETRICS & GYNECOLOGY

## 2020-08-14 PROCEDURE — 3074F PR MOST RECENT SYSTOLIC BLOOD PRESSURE < 130 MM HG: ICD-10-PCS | Mod: CPTII,S$GLB,, | Performed by: OBSTETRICS & GYNECOLOGY

## 2020-08-14 PROCEDURE — 3078F DIAST BP <80 MM HG: CPT | Mod: CPTII,S$GLB,, | Performed by: OBSTETRICS & GYNECOLOGY

## 2020-08-14 RX ORDER — ESTRADIOL 0.1 MG/G
0.5 CREAM VAGINAL
Qty: 42 G | Refills: 4 | Status: SHIPPED | OUTPATIENT
Start: 2020-08-17 | End: 2021-08-17

## 2020-08-14 RX ORDER — FLUCONAZOLE 150 MG/1
150 TABLET ORAL ONCE
Qty: 2 TABLET | Refills: 0 | Status: SHIPPED | OUTPATIENT
Start: 2020-08-14 | End: 2020-08-14

## 2020-08-14 RX ORDER — ESTRADIOL 1 MG/1
1 TABLET ORAL DAILY
Qty: 90 TABLET | Refills: 3 | Status: SHIPPED | OUTPATIENT
Start: 2020-08-14 | End: 2021-07-19

## 2020-08-14 NOTE — PROGRESS NOTES
Subjective:       Patient ID: Nicole Jacques is a 70 y.o. female.    Chief Complaint:  Well Woman and Urinary Urgency      History of Present Illness  HPI  Nicole Jacques is a 70 y.o. female  (new to me, patient of Dr. Black)  here for her annual GYN exam. She has been on ERT for many years and states is doing well, wants to stay on it because got severe hot flashes when she tried to stop.   She does report urge incontinence for several months and has a vaginal discharge.   denies vaginal itching or irritation.  Reports vaginal discharge.  She is sexually active. She has had 1 partner for over 50 years .   History of abnormal pap: No  Last Pap: was normal  Last MMG: normal--routine follow-up in 12 months  Last Colonoscopy:  colonoscopy several years ago without abnormalities.  denies domestic violence. She does feel safe at home.     Past Medical History:   Diagnosis Date    Anxiety     Cataract     Depression     Family history of colon cancer     GERD (gastroesophageal reflux disease)     Hypertension     Lumbar spondylosis     Lumbar spondylosis     Overactive bladder     Urge urinary incontinence      Past Surgical History:   Procedure Laterality Date    APPENDECTOMY      CATARACT EXTRACTION W/  INTRAOCULAR LENS IMPLANT Left 14    Dr Drake    CATARACT EXTRACTION W/  INTRAOCULAR LENS IMPLANT Right 14    COLONOSCOPY N/A 2015    Procedure: COLONOSCOPY;  Surgeon: Desmond Casillas MD;  Location: [address];  Service: Endoscopy;  Laterality: N/A;    EYE SURGERY      HYSTERECTOMY      partial    KNEE SURGERY Left     TUBAL LIGATION       Social History     Socioeconomic History    Marital status:      Spouse name: Not on file    Number of children: Not on file    Years of education: Not on file    Highest education level: Not on file   Occupational History    Not on file   Social Needs    Financial resource strain: Not on file    Food insecurity      "Worry: Not on file     Inability: Not on file    Transportation needs     Medical: Not on file     Non-medical: Not on file   Tobacco Use    Smoking status: Passive Smoke Exposure - Never Smoker    Smokeless tobacco: Never Used   Substance and Sexual Activity    Alcohol use: Yes     Comment: Social.    Drug use: No    Sexual activity: Yes     Partners: Male     Comment: , together x 51 years   Lifestyle    Physical activity     Days per week: Not on file     Minutes per session: Not on file    Stress: Not on file   Relationships    Social connections     Talks on phone: Not on file     Gets together: Not on file     Attends Sabianism service: Not on file     Active member of club or organization: Not on file     Attends meetings of clubs or organizations: Not on file     Relationship status: Not on file   Other Topics Concern    Are you pregnant or think you may be? No    Breast-feeding No   Social History Narrative    Not on file     Family History   Problem Relation Age of Onset    Hypertension Mother     Kidney disease Mother     Heart disease Mother     Dementia Father     Cancer Father         bladder    Basal cell carcinoma Father     Diabetes Maternal Grandmother     Diabetes Paternal Grandmother     Cancer Maternal Uncle         colon cancer    Hypertension Sister     Diabetes Sister     Diabetes Brother     Hypertension Brother     No Known Problems Daughter     No Known Problems Son     Hypertension Sister     Cancer Sister         breast cancer    Breast cancer Sister     Amblyopia Neg Hx     Blindness Neg Hx     Cataracts Neg Hx     Glaucoma Neg Hx     Macular degeneration Neg Hx     Retinal detachment Neg Hx     Strabismus Neg Hx     Melanoma Neg Hx      OB History        2    Para   2    Term   2            AB        Living   2       SAB        TAB        Ectopic        Multiple        Live Births   2                 /64   Ht 5' 5" " (1.651 m)   Wt 75.1 kg (165 lb 9.1 oz)   BMI 27.55 kg/m²         GYN & OB History    Date of Last Pap: 2018    OB History    Para Term  AB Living   2 2 2     2   SAB TAB Ectopic Multiple Live Births           2      # Outcome Date GA Lbr Lui/2nd Weight Sex Delivery Anes PTL Lv   2 Term      Vag-Spont   LIZZY   1 Term      Vag-Spont   LIZZY       Review of Systems  Review of Systems   Constitutional: Negative for activity change, appetite change, fatigue and unexpected weight change.   HENT: Negative.    Eyes: Negative for visual disturbance.   Respiratory: Negative for shortness of breath and wheezing.    Cardiovascular: Negative for chest pain, palpitations and leg swelling.   Gastrointestinal: Negative for abdominal pain, bloating and blood in stool.   Endocrine: Negative for diabetes, hair loss and hot flashes.   Genitourinary: Positive for bladder incontinence, dyspareunia, frequency, urgency and vaginal dryness. Negative for decreased libido and hot flashes.   Musculoskeletal: Negative for back pain and joint swelling.   Integumentary:  Negative for acne, hair changes and nipple discharge.   Neurological: Negative for headaches.   Hematological: Does not bruise/bleed easily.   Psychiatric/Behavioral: Negative for depression and sleep disturbance. The patient is not nervous/anxious.    Breast: Negative for mastodynia and nipple discharge          Objective:      Physical Exam:   Constitutional: She is oriented to person, place, and time. She appears well-developed and well-nourished.    HENT:   Head: Normocephalic and atraumatic.    Eyes: Pupils are equal, round, and reactive to light. EOM are normal.    Neck: Normal range of motion. Neck supple.    Cardiovascular: Normal rate and regular rhythm.     Pulmonary/Chest: Effort normal and breath sounds normal.   BREASTS:  no mass, no tenderness, no deformity and no retraction. Right breast exhibits no inverted nipple, no mass, no nipple discharge,  no skin change, no tenderness, no bleeding and no swelling. Left breast exhibits no inverted nipple, no mass, no nipple discharge, no skin change, no tenderness, no bleeding and no swelling. Breasts are symmetrical.              Abdominal: Soft. Bowel sounds are normal.     Genitourinary:    Pelvic exam was performed with patient supine.      Genitourinary Comments: PELVIC: Normal external female genitalia without lesions. Normal hair distribution. Adequate perineal body, normal urethral meatus. Vagina atrophic without lesions + COPIOUS FLOCCULENT discharge. No significant cystocele or rectocele. Bimanual exam shows uterus and cervix to be surgically absent. Adnexa without masses or tenderness.  RECTAL: Deferred               Musculoskeletal: Normal range of motion and moves all extremeties.       Neurological: She is alert and oriented to person, place, and time.    Skin: Skin is warm and dry.    Psychiatric: She has a normal mood and affect.              Assessment:        1. Encounter for gynecological examination without abnormal finding    2. Menopausal symptoms    3. Screening mammogram, encounter for    4. Postmenopausal atrophic vaginitis    5. Vaginal discharge                Plan:      1. Encounter for gynecological examination without abnormal finding  COUNSELING:  The patient was counseled today on regular weight bearing exercise. Patient was counseled today on the new ACS guidelines for cervical cytology screening as well as the current recommendations for breast cancer screening. Counseling session lasted approximately 10 minutes, and all her questions were answered. She was advised to see her primary care physician for all other health maintenance.   FOLLOW-UP with me for next routine visit.         2. Menopausal symptoms      - estradioL (ESTRACE) 1 MG tablet; Take 1 tablet (1 mg total) by mouth once daily.  Dispense: 90 tablet; Refill: 3    3. Screening mammogram, encounter for      - Mammo Digital  Screening Bilat w/ Tristan; Future    4. Postmenopausal atrophic vaginitis      - estradioL (ESTRACE) 0.01 % (0.1 mg/gram) vaginal cream; Place 0.5 g vaginally twice a week. Insert 0.5grams intravaginally twice weekly  Dispense: 42 g; Refill: 4    5. Vaginal discharge      - Vaginosis Screen by DNA Probe  - fluconazole (DIFLUCAN) 150 MG Tab; Take 1 tablet (150 mg total) by mouth once. And repeat in 3 days if not improving. for 1 dose  Dispense: 2 tablet; Refill: 0       Follow up in about 1 year (around 8/14/2021).

## 2020-08-19 ENCOUNTER — TELEPHONE (OUTPATIENT)
Dept: NEUROLOGY | Facility: CLINIC | Age: 70
End: 2020-08-19

## 2020-08-19 NOTE — TELEPHONE ENCOUNTER
Returned call no answer.              ----- Message from Magaly Parth sent at 8/19/2020  4:00 PM CDT -----  Type:  Sooner Appointment Request    Patient is requesting a sooner appointment.  Patient declined first available appointment listed as well as another facility and provider .  Patient will not accept being placed on the waitlist and is requesting a message be sent to doctor.    Name of Caller: pt     When is the first available appointment? 11/11    Symptoms: Tremors    Would the patient rather a call back or a response via [facility]? Call back     Best Call Back Number: [phone]    Additional Information: pt unable to make appt on 08/21 and asking for another appt.

## 2020-08-25 ENCOUNTER — TELEPHONE (OUTPATIENT)
Dept: OBSTETRICS AND GYNECOLOGY | Facility: CLINIC | Age: 70
End: 2020-08-25

## 2020-08-25 NOTE — TELEPHONE ENCOUNTER
----- Message from Jamison Sullivan sent at 8/25/2020  3:06 PM CDT -----  Regarding: appt/call back  Pt calling in regards to she needs a nurse to contact her please in reference to an appt she noticed she has on September 2nd with provider, pt is not sure of why she is meeting with provider again after she just had an appt last week with her.        Please advise pt can be contact at [phone]

## 2020-09-17 ENCOUNTER — PATIENT OUTREACH (OUTPATIENT)
Dept: ADMINISTRATIVE | Facility: OTHER | Age: 70
End: 2020-09-17

## 2020-09-18 ENCOUNTER — OFFICE VISIT (OUTPATIENT)
Dept: NEUROLOGY | Facility: CLINIC | Age: 70
End: 2020-09-18
Payer: MEDICARE

## 2020-09-18 VITALS
BODY MASS INDEX: 28.02 KG/M2 | HEIGHT: 65 IN | HEART RATE: 84 BPM | DIASTOLIC BLOOD PRESSURE: 76 MMHG | WEIGHT: 168.19 LBS | SYSTOLIC BLOOD PRESSURE: 129 MMHG

## 2020-09-18 DIAGNOSIS — G25.0 ESSENTIAL TREMOR: Primary | ICD-10-CM

## 2020-09-18 PROCEDURE — 3074F PR MOST RECENT SYSTOLIC BLOOD PRESSURE < 130 MM HG: ICD-10-PCS | Mod: CPTII,S$GLB,, | Performed by: NEUROLOGICAL SURGERY

## 2020-09-18 PROCEDURE — 99999 PR PBB SHADOW E&M-EST. PATIENT-LVL IV: ICD-10-PCS | Mod: PBBFAC,,, | Performed by: NEUROLOGICAL SURGERY

## 2020-09-18 PROCEDURE — 3288F PR FALLS RISK ASSESSMENT DOCUMENTED: ICD-10-PCS | Mod: CPTII,S$GLB,, | Performed by: NEUROLOGICAL SURGERY

## 2020-09-18 PROCEDURE — 3008F BODY MASS INDEX DOCD: CPT | Mod: CPTII,S$GLB,, | Performed by: NEUROLOGICAL SURGERY

## 2020-09-18 PROCEDURE — 3074F SYST BP LT 130 MM HG: CPT | Mod: CPTII,S$GLB,, | Performed by: NEUROLOGICAL SURGERY

## 2020-09-18 PROCEDURE — 3288F FALL RISK ASSESSMENT DOCD: CPT | Mod: CPTII,S$GLB,, | Performed by: NEUROLOGICAL SURGERY

## 2020-09-18 PROCEDURE — 1159F MED LIST DOCD IN RCRD: CPT | Mod: S$GLB,,, | Performed by: NEUROLOGICAL SURGERY

## 2020-09-18 PROCEDURE — 1100F PTFALLS ASSESS-DOCD GE2>/YR: CPT | Mod: CPTII,S$GLB,, | Performed by: NEUROLOGICAL SURGERY

## 2020-09-18 PROCEDURE — 1126F AMNT PAIN NOTED NONE PRSNT: CPT | Mod: S$GLB,,, | Performed by: NEUROLOGICAL SURGERY

## 2020-09-18 PROCEDURE — 99204 PR OFFICE/OUTPT VISIT, NEW, LEVL IV, 45-59 MIN: ICD-10-PCS | Mod: S$GLB,,, | Performed by: NEUROLOGICAL SURGERY

## 2020-09-18 PROCEDURE — 3078F DIAST BP <80 MM HG: CPT | Mod: CPTII,S$GLB,, | Performed by: NEUROLOGICAL SURGERY

## 2020-09-18 PROCEDURE — 99999 PR PBB SHADOW E&M-EST. PATIENT-LVL IV: CPT | Mod: PBBFAC,,, | Performed by: NEUROLOGICAL SURGERY

## 2020-09-18 PROCEDURE — 3008F PR BODY MASS INDEX (BMI) DOCUMENTED: ICD-10-PCS | Mod: CPTII,S$GLB,, | Performed by: NEUROLOGICAL SURGERY

## 2020-09-18 PROCEDURE — 1159F PR MEDICATION LIST DOCUMENTED IN MEDICAL RECORD: ICD-10-PCS | Mod: S$GLB,,, | Performed by: NEUROLOGICAL SURGERY

## 2020-09-18 PROCEDURE — 3078F PR MOST RECENT DIASTOLIC BLOOD PRESSURE < 80 MM HG: ICD-10-PCS | Mod: CPTII,S$GLB,, | Performed by: NEUROLOGICAL SURGERY

## 2020-09-18 PROCEDURE — 1100F PR PT FALLS ASSESS DOC 2+ FALLS/FALL W/INJURY/YR: ICD-10-PCS | Mod: CPTII,S$GLB,, | Performed by: NEUROLOGICAL SURGERY

## 2020-09-18 PROCEDURE — 99204 OFFICE O/P NEW MOD 45 MIN: CPT | Mod: S$GLB,,, | Performed by: NEUROLOGICAL SURGERY

## 2020-09-18 PROCEDURE — 1126F PR PAIN SEVERITY QUANTIFIED, NO PAIN PRESENT: ICD-10-PCS | Mod: S$GLB,,, | Performed by: NEUROLOGICAL SURGERY

## 2020-09-18 RX ORDER — TOPIRAMATE 50 MG/1
50 TABLET, FILM COATED ORAL 2 TIMES DAILY
Qty: 60 TABLET | Refills: 11 | Status: SHIPPED | OUTPATIENT
Start: 2020-09-18 | End: 2020-10-22 | Stop reason: SINTOL

## 2020-09-18 NOTE — PROGRESS NOTES
Chief Complaint   Patient presents with    Tremors        Nicole Jacques is a 70 y.o. female with a history of multiple medical diagnoses as listed below that presents for evaluation of termor's. She has been noticing a tremor in her hands that seems to be getting worse with time. She has not been able to identify anything that has made the tremor any worse. There is not a family history of any tremor. She can not say for sure if alcohol has any affect on her tremor as she does not drink often. There has not been a negative impact on her life due to the tremors. She has not felt that she has had any change in her sleep, memory, or walking..     PAST MEDICAL HISTORY:  Past Medical History:   Diagnosis Date    Anxiety     Cataract     Depression     Family history of colon cancer     GERD (gastroesophageal reflux disease)     Hypertension     Lumbar spondylosis     Lumbar spondylosis     Overactive bladder     Urge urinary incontinence        PAST SURGICAL HISTORY:  Past Surgical History:   Procedure Laterality Date    APPENDECTOMY      CATARACT EXTRACTION W/  INTRAOCULAR LENS IMPLANT Left 06/03/14    Dr Drake    CATARACT EXTRACTION W/  INTRAOCULAR LENS IMPLANT Right 7/7/14    COLONOSCOPY N/A 11/2/2015    Procedure: COLONOSCOPY;  Surgeon: Desmond Casillas MD;  Location: [address];  Service: Endoscopy;  Laterality: N/A;    EYE SURGERY      HYSTERECTOMY      partial    KNEE SURGERY Left     TUBAL LIGATION         SOCIAL HISTORY:  Social History     Socioeconomic History    Marital status:      Spouse name: Not on file    Number of children: Not on file    Years of education: Not on file    Highest education level: Not on file   Occupational History    Not on file   Social Needs    Financial resource strain: Not on file    Food insecurity     Worry: Not on file     Inability: Not on file    Transportation needs     Medical: Not on file     Non-medical: Not on file   Tobacco  Use    Smoking status: Passive Smoke Exposure - Never Smoker    Smokeless tobacco: Never Used   Substance and Sexual Activity    Alcohol use: Yes     Comment: Social.    Drug use: No    Sexual activity: Yes     Partners: Male     Comment: , together x 51 years   Lifestyle    Physical activity     Days per week: Not on file     Minutes per session: Not on file    Stress: Not on file   Relationships    Social connections     Talks on phone: Not on file     Gets together: Not on file     Attends Sikhism service: Not on file     Active member of club or organization: Not on file     Attends meetings of clubs or organizations: Not on file     Relationship status: Not on file   Other Topics Concern    Are you pregnant or think you may be? No    Breast-feeding No   Social History Narrative    Not on file       FAMILY HISTORY:  Family History   Problem Relation Age of Onset    Hypertension Mother     Kidney disease Mother     Heart disease Mother     Dementia Father     Cancer Father         bladder    Basal cell carcinoma Father     Diabetes Maternal Grandmother     Diabetes Paternal Grandmother     Cancer Maternal Uncle         colon cancer    Hypertension Sister     Diabetes Sister     Diabetes Brother     Hypertension Brother     No Known Problems Daughter     No Known Problems Son     Hypertension Sister     Cancer Sister         breast cancer    Breast cancer Sister     Amblyopia Neg Hx     Blindness Neg Hx     Cataracts Neg Hx     Glaucoma Neg Hx     Macular degeneration Neg Hx     Retinal detachment Neg Hx     Strabismus Neg Hx     Melanoma Neg Hx        ALLERGIES AND MEDICATIONS: updated and reviewed.  Review of patient's allergies indicates:   Allergen Reactions    Sulfamethoxazole-trimethoprim Nausea And Vomiting and Other (See Comments)     Other reaction(s): Migraine     Current Outpatient Medications   Medication Sig Dispense Refill    ALPRAZolam (XANAX) 2 MG Tab  Take 1 tablet by mouth twice daily as needed 60 tablet 5    amLODIPine (NORVASC) 10 MG tablet TAKE 1 TABLET BY MOUTH ONCE DAILY 90 tablet 2    ascorbic acid (VITAMIN C) 1000 MG tablet Take 2 tablets by mouth every morning. Takes two 1000 mg tablets every morning      aspirin (ECOTRIN) 81 MG EC tablet Take 81 mg by mouth every morning.      B-complex with vitamin C (Z-BEC OR EQUIV) tablet Take 1 tablet by mouth every morning.       co-enzyme Q-10 30 mg capsule Take 30 mg by mouth every morning.       estradioL (ESTRACE) 0.01 % (0.1 mg/gram) vaginal cream Place 0.5 g vaginally twice a week. Insert 0.5grams intravaginally twice weekly 42 g 4    estradioL (ESTRACE) 1 MG tablet Take 1 tablet (1 mg total) by mouth once daily. 90 tablet 3    losartan (COZAAR) 100 MG tablet TAKE 1 TABLET BY MOUTH ONCE DAILY 90 tablet 1    LUBRICANT EYE DROPS, GLYC-PG, 1-0.3 % Drop Place 1 drop into both eyes as needed.       meloxicam (MOBIC) 15 MG tablet TAKE 1 TABLET BY MOUTH  DAILY AS NEEDED FOR PAIN 90 tablet 1    MULTIVITAMIN (MULTIPLE VITAMIN ESSENTIAL ORAL) Take 1 tablet by mouth every morning.       omega-3 fatty acids (FISH OIL) 500 mg Cap Take 1 capsule by mouth every morning.       omeprazole (PRILOSEC) 20 MG capsule TAKE 1 CAPSULE BY MOUTH  ONCE DAILY 90 capsule 2    paroxetine (PAXIL) 40 MG tablet TAKE 1 TABLET BY MOUTH ONCE DAILY 90 tablet 1    VITAMIN D3 5,000 unit Tab Take 5,000 Units by mouth every morning.       topiramate (TOPAMAX) 50 MG tablet Take 1 tablet (50 mg total) by mouth 2 (two) times daily. 60 tablet 11     No current facility-administered medications for this visit.        Review of Systems   Constitutional: Negative for activity change, appetite change, fever and unexpected weight change.   HENT: Negative for trouble swallowing and voice change.    Eyes: Negative for photophobia and visual disturbance.   Respiratory: Negative for apnea and shortness of breath.    Cardiovascular: Negative for  chest pain and leg swelling.   Gastrointestinal: Negative for constipation and nausea.   Genitourinary: Negative for difficulty urinating.   Musculoskeletal: Negative for back pain, gait problem and neck pain.   Skin: Negative for color change and pallor.   Neurological: Positive for tremors. Negative for dizziness, seizures, syncope, weakness and numbness.   Hematological: Negative for adenopathy.   Psychiatric/Behavioral: Negative for agitation, confusion and decreased concentration.       Neurologic Exam     Mental Status   Oriented to person, place, and time.   Registration: recalls 3 of 3 objects.   Attention: normal. Concentration: normal.   Speech: speech is normal   Level of consciousness: alert  Knowledge: good.     Cranial Nerves     CN II   Visual fields full to confrontation.   Right visual field deficit: none  Left visual field deficit: none     CN III, IV, VI   Pupils are equal, round, and reactive to light.  Extraocular motions are normal.   Right pupil: Size: 3 mm. Shape: regular. Accommodation: intact.   Left pupil: Size: 3 mm. Shape: regular. Accommodation: intact.   CN III: no CN III palsy  CN VI: no CN VI palsy  Nystagmus: none   Diplopia: none  Ophthalmoparesis: none  Upgaze: normal  Downgaze: normal  Conjugate gaze: present    CN V   Facial sensation intact.   Right facial sensation deficit: none  Left facial sensation deficit: none    CN VII   Facial expression full, symmetric.   Right facial weakness: none  Left facial weakness: none    CN VIII   CN VIII normal.     CN IX, X   CN IX normal.   CN X normal.   Palate: symmetric    CN XI   CN XI normal.   Right sternocleidomastoid strength: normal  Left sternocleidomastoid strength: normal  Right trapezius strength: normal  Left trapezius strength: normal    CN XII   CN XII normal.   Tongue deviation: none    Motor Exam   Muscle bulk: normal  Overall muscle tone: normal  Right arm tone: normal  Left arm tone: normal  Right leg tone: normal  Left  leg tone: normal    Strength   Strength 5/5 throughout.     Sensory Exam   Right arm light touch: normal  Left arm light touch: normal  Right leg light touch: normal  Left leg light touch: normal  Right arm vibration: normal  Left arm vibration: normal  Right leg vibration: normal  Left leg vibration: normal  Right arm proprioception: normal  Left arm proprioception: normal  Right leg proprioception: normal  Left leg proprioception: normal  Right arm pinprick: normal  Left arm pinprick: normal  Right leg pinprick: normal  Left leg pinprick: normal    Gait, Coordination, and Reflexes     Gait  Gait: normal    Coordination   Romberg: negative  Finger to nose coordination: normal  Heel to shin coordination: normal  Tandem walking coordination: normal    Tremor   Resting tremor: absent  Intention tremor: present    Reflexes   Right brachioradialis: 2+  Left brachioradialis: 2+  Right biceps: 2+  Left biceps: 2+  Right triceps: 2+  Left triceps: 2+  Right patellar: 2+  Left patellar: 2+  Right achilles: 2+  Left achilles: 2+  Right plantar: normal  Left plantar: normal      Physical Exam  Vitals signs reviewed.   Constitutional:       Appearance: She is well-developed.   HENT:      Head: Normocephalic and atraumatic.   Eyes:      Extraocular Movements: EOM normal.      Pupils: Pupils are equal, round, and reactive to light.   Neck:      Musculoskeletal: Normal range of motion.   Cardiovascular:      Rate and Rhythm: Normal rate.   Pulmonary:      Effort: Pulmonary effort is normal. No respiratory distress.   Musculoskeletal: Normal range of motion.   Skin:     General: Skin is warm and dry.   Neurological:      Mental Status: She is alert and oriented to person, place, and time.      Coordination: Finger-Nose-Finger Test, Heel to Shin Test and Romberg Test normal.      Gait: Gait is intact. Tandem walk normal.      Deep Tendon Reflexes: Strength normal.      Reflex Scores:       Tricep reflexes are 2+ on the right side  "and 2+ on the left side.       Bicep reflexes are 2+ on the right side and 2+ on the left side.       Brachioradialis reflexes are 2+ on the right side and 2+ on the left side.       Patellar reflexes are 2+ on the right side and 2+ on the left side.       Achilles reflexes are 2+ on the right side and 2+ on the left side.  Psychiatric:         Speech: Speech normal.         Behavior: Behavior normal.         Thought Content: Thought content normal.         Vitals:    09/18/20 1010   BP: 129/76   BP Location: Right arm   Patient Position: Sitting   BP Method: Large (Automatic)   Pulse: 84   Weight: 76.3 kg (168 lb 3.4 oz)   Height: 5' 5" (1.651 m)       Assessment & Plan:    Problem List Items Addressed This Visit     None      Visit Diagnoses     Essential tremor    -  Primary    Relevant Medications    topiramate (TOPAMAX) 50 MG tablet        More than 50% of this 45 minute encounter was spent in counseling and coordinating care of tremor.    Follow-up: Follow up in about 3 months (around 12/18/2020).    This note was done with the assistance of voice recognition software. Some errors may be present after proofreading.        "

## 2020-09-22 ENCOUNTER — OFFICE VISIT (OUTPATIENT)
Dept: OPTOMETRY | Facility: CLINIC | Age: 70
End: 2020-09-22
Payer: MEDICARE

## 2020-09-22 DIAGNOSIS — H52.4 PRESBYOPIA: ICD-10-CM

## 2020-09-22 DIAGNOSIS — H04.123 DRY EYES, BILATERAL: Primary | ICD-10-CM

## 2020-09-22 DIAGNOSIS — Z13.5 GLAUCOMA SCREENING: ICD-10-CM

## 2020-09-22 PROCEDURE — 92014 COMPRE OPH EXAM EST PT 1/>: CPT | Mod: S$GLB,,, | Performed by: OPTOMETRIST

## 2020-09-22 PROCEDURE — 99999 PR PBB SHADOW E&M-EST. PATIENT-LVL III: ICD-10-PCS | Mod: PBBFAC,,, | Performed by: OPTOMETRIST

## 2020-09-22 PROCEDURE — 92015 PR REFRACTION: ICD-10-PCS | Mod: S$GLB,,, | Performed by: OPTOMETRIST

## 2020-09-22 PROCEDURE — 99999 PR PBB SHADOW E&M-EST. PATIENT-LVL III: CPT | Mod: PBBFAC,,, | Performed by: OPTOMETRIST

## 2020-09-22 PROCEDURE — 92014 PR EYE EXAM, EST PATIENT,COMPREHESV: ICD-10-PCS | Mod: S$GLB,,, | Performed by: OPTOMETRIST

## 2020-09-22 PROCEDURE — 92015 DETERMINE REFRACTIVE STATE: CPT | Mod: S$GLB,,, | Performed by: OPTOMETRIST

## 2020-09-22 NOTE — PROGRESS NOTES
HPI     DLS: 4/9/18 [facility]  Pt states her eyes are dry and every now and then her eyes will burn her.   She uses SYSTANE  gtts 1x a day only at night. Pt also wears a mask at   night. No VA problems, wears OTC readers 2.00-2.50.   No f/f  systane     Last edited by Scott Box, OD on 9/22/2020 10:47 AM. (History)        ROS     Positive for: Eyes (cat surgery/blepharoplasty)    Negative for: Constitutional, Gastrointestinal, Neurological, Skin,   Genitourinary, Musculoskeletal, HENT, Endocrine, Cardiovascular,   Respiratory, Psychiatric, Allergic/Imm, Heme/Lymph    Last edited by Scott Box, OD on 9/22/2020 10:51 AM. (History)        Assessment /Plan     For exam results, see Encounter Report.    Dry eyes, bilateral    Glaucoma screening    Presbyopia      1. Sp pciol OU--pt wishes bifocal Rx  2. Blepharoplasty hx  3. Dry eyes--advised inc SYSTANE to QID, and cont sleep mask at night    PLAN:    rtc 1 yr

## 2020-09-30 ENCOUNTER — TELEPHONE (OUTPATIENT)
Dept: NEUROLOGY | Facility: CLINIC | Age: 70
End: 2020-09-30

## 2020-09-30 NOTE — TELEPHONE ENCOUNTER
Mouth is dry, taste is different, dizzy, and she can't sleep at night. She does not feel herself. Asked patient if she wanted to try something else she will call back.            ----- Message from Sharri Duffy sent at 9/30/2020  2:29 PM CDT -----  Regarding: questions about medication  Type: Patient Call Back    Who called:Nicole     What is the request in detail: The patient is requesting a call back from the staff. She has questions to ask about medication she was recently prescribed. The name of the medication is: topiramate (TOPAMAX) 50 MG tablet    Can the clinic reply by [facility]?no    Would the patient rather a call back or a response via [facility]? Call back    Best call back number:[phone]        
open, soft

## 2020-10-10 DIAGNOSIS — F41.1 GAD (GENERALIZED ANXIETY DISORDER): ICD-10-CM

## 2020-10-10 RX ORDER — ALPRAZOLAM 2 MG/1
TABLET ORAL
Qty: 60 TABLET | Refills: 5 | Status: SHIPPED | OUTPATIENT
Start: 2020-10-10 | End: 2020-10-10 | Stop reason: SDUPTHER

## 2020-10-10 RX ORDER — ALPRAZOLAM 2 MG/1
2 TABLET ORAL 2 TIMES DAILY PRN
Qty: 60 TABLET | Refills: 5 | Status: SHIPPED | OUTPATIENT
Start: 2020-10-10 | End: 2021-04-19

## 2020-10-19 ENCOUNTER — TELEPHONE (OUTPATIENT)
Dept: OBSTETRICS AND GYNECOLOGY | Facility: CLINIC | Age: 70
End: 2020-10-19

## 2020-10-19 NOTE — TELEPHONE ENCOUNTER
----- Message from Dodie Jones sent at 10/19/2020  2:40 PM CDT -----  Regarding: pt advice  Contact: pt [phone]  Pt is calling because she received a bill for a lab test that was sent over by you to [facility]. Please assist pt. [phone]

## 2020-10-22 ENCOUNTER — LAB VISIT (OUTPATIENT)
Dept: LAB | Facility: HOSPITAL | Age: 70
End: 2020-10-22
Attending: INTERNAL MEDICINE
Payer: MEDICARE

## 2020-10-22 ENCOUNTER — OFFICE VISIT (OUTPATIENT)
Dept: PRIMARY CARE CLINIC | Facility: CLINIC | Age: 70
End: 2020-10-22
Payer: MEDICARE

## 2020-10-22 VITALS
WEIGHT: 167.19 LBS | HEIGHT: 65 IN | HEART RATE: 78 BPM | BODY MASS INDEX: 27.86 KG/M2 | DIASTOLIC BLOOD PRESSURE: 80 MMHG | SYSTOLIC BLOOD PRESSURE: 126 MMHG

## 2020-10-22 DIAGNOSIS — R73.03 PRE-DIABETES: ICD-10-CM

## 2020-10-22 DIAGNOSIS — F41.1 GAD (GENERALIZED ANXIETY DISORDER): ICD-10-CM

## 2020-10-22 DIAGNOSIS — I10 ESSENTIAL HYPERTENSION: Primary | ICD-10-CM

## 2020-10-22 DIAGNOSIS — I10 ESSENTIAL HYPERTENSION: ICD-10-CM

## 2020-10-22 DIAGNOSIS — Z12.11 COLON CANCER SCREENING: ICD-10-CM

## 2020-10-22 LAB
ALBUMIN SERPL BCP-MCNC: 3.8 G/DL (ref 3.5–5.2)
ALP SERPL-CCNC: 54 U/L (ref 55–135)
ALT SERPL W/O P-5'-P-CCNC: 16 U/L (ref 10–44)
ANION GAP SERPL CALC-SCNC: 10 MMOL/L (ref 8–16)
AST SERPL-CCNC: 23 U/L (ref 10–40)
BASOPHILS # BLD AUTO: 0.02 K/UL (ref 0–0.2)
BASOPHILS NFR BLD: 0.5 % (ref 0–1.9)
BILIRUB SERPL-MCNC: 0.3 MG/DL (ref 0.1–1)
BUN SERPL-MCNC: 21 MG/DL (ref 8–23)
CALCIUM SERPL-MCNC: 9.1 MG/DL (ref 8.7–10.5)
CHLORIDE SERPL-SCNC: 104 MMOL/L (ref 95–110)
CHOLEST SERPL-MCNC: 184 MG/DL (ref 120–199)
CHOLEST/HDLC SERPL: 2.7 {RATIO} (ref 2–5)
CO2 SERPL-SCNC: 25 MMOL/L (ref 23–29)
CREAT SERPL-MCNC: 0.9 MG/DL (ref 0.5–1.4)
DIFFERENTIAL METHOD: ABNORMAL
EOSINOPHIL # BLD AUTO: 0.1 K/UL (ref 0–0.5)
EOSINOPHIL NFR BLD: 1.7 % (ref 0–8)
ERYTHROCYTE [DISTWIDTH] IN BLOOD BY AUTOMATED COUNT: 13.2 % (ref 11.5–14.5)
EST. GFR  (AFRICAN AMERICAN): >60 ML/MIN/1.73 M^2
EST. GFR  (NON AFRICAN AMERICAN): >60 ML/MIN/1.73 M^2
GLUCOSE SERPL-MCNC: 108 MG/DL (ref 70–110)
HCT VFR BLD AUTO: 38.5 % (ref 37–48.5)
HDLC SERPL-MCNC: 67 MG/DL (ref 40–75)
HDLC SERPL: 36.4 % (ref 20–50)
HGB BLD-MCNC: 12.1 G/DL (ref 12–16)
IMM GRANULOCYTES # BLD AUTO: 0.01 K/UL (ref 0–0.04)
IMM GRANULOCYTES NFR BLD AUTO: 0.2 % (ref 0–0.5)
LDLC SERPL CALC-MCNC: 89.2 MG/DL (ref 63–159)
LYMPHOCYTES # BLD AUTO: 1 K/UL (ref 1–4.8)
LYMPHOCYTES NFR BLD: 24.3 % (ref 18–48)
MCH RBC QN AUTO: 30.3 PG (ref 27–31)
MCHC RBC AUTO-ENTMCNC: 31.4 G/DL (ref 32–36)
MCV RBC AUTO: 96 FL (ref 82–98)
MONOCYTES # BLD AUTO: 0.3 K/UL (ref 0.3–1)
MONOCYTES NFR BLD: 7.2 % (ref 4–15)
NEUTROPHILS # BLD AUTO: 2.7 K/UL (ref 1.8–7.7)
NEUTROPHILS NFR BLD: 66.1 % (ref 38–73)
NONHDLC SERPL-MCNC: 117 MG/DL
NRBC BLD-RTO: 0 /100 WBC
PLATELET # BLD AUTO: 218 K/UL (ref 150–350)
PMV BLD AUTO: 12 FL (ref 9.2–12.9)
POTASSIUM SERPL-SCNC: 3.9 MMOL/L (ref 3.5–5.1)
PROT SERPL-MCNC: 7.9 G/DL (ref 6–8.4)
RBC # BLD AUTO: 4 M/UL (ref 4–5.4)
SODIUM SERPL-SCNC: 139 MMOL/L (ref 136–145)
TRIGL SERPL-MCNC: 139 MG/DL (ref 30–150)
WBC # BLD AUTO: 4.15 K/UL (ref 3.9–12.7)

## 2020-10-22 PROCEDURE — 3079F DIAST BP 80-89 MM HG: CPT | Mod: CPTII,S$GLB,, | Performed by: INTERNAL MEDICINE

## 2020-10-22 PROCEDURE — 1159F PR MEDICATION LIST DOCUMENTED IN MEDICAL RECORD: ICD-10-PCS | Mod: S$GLB,,, | Performed by: INTERNAL MEDICINE

## 2020-10-22 PROCEDURE — 85025 COMPLETE CBC W/AUTO DIFF WBC: CPT

## 2020-10-22 PROCEDURE — 36415 COLL VENOUS BLD VENIPUNCTURE: CPT | Mod: PN

## 2020-10-22 PROCEDURE — 80061 LIPID PANEL: CPT

## 2020-10-22 PROCEDURE — 3008F PR BODY MASS INDEX (BMI) DOCUMENTED: ICD-10-PCS | Mod: CPTII,S$GLB,, | Performed by: INTERNAL MEDICINE

## 2020-10-22 PROCEDURE — 1101F PR PT FALLS ASSESS DOC 0-1 FALLS W/OUT INJ PAST YR: ICD-10-PCS | Mod: CPTII,S$GLB,, | Performed by: INTERNAL MEDICINE

## 2020-10-22 PROCEDURE — 3079F PR MOST RECENT DIASTOLIC BLOOD PRESSURE 80-89 MM HG: ICD-10-PCS | Mod: CPTII,S$GLB,, | Performed by: INTERNAL MEDICINE

## 2020-10-22 PROCEDURE — 99214 OFFICE O/P EST MOD 30 MIN: CPT | Mod: S$GLB,,, | Performed by: INTERNAL MEDICINE

## 2020-10-22 PROCEDURE — 1101F PT FALLS ASSESS-DOCD LE1/YR: CPT | Mod: CPTII,S$GLB,, | Performed by: INTERNAL MEDICINE

## 2020-10-22 PROCEDURE — 1126F AMNT PAIN NOTED NONE PRSNT: CPT | Mod: S$GLB,,, | Performed by: INTERNAL MEDICINE

## 2020-10-22 PROCEDURE — 83036 HEMOGLOBIN GLYCOSYLATED A1C: CPT

## 2020-10-22 PROCEDURE — 99999 PR PBB SHADOW E&M-EST. PATIENT-LVL IV: CPT | Mod: PBBFAC,,, | Performed by: INTERNAL MEDICINE

## 2020-10-22 PROCEDURE — 1126F PR PAIN SEVERITY QUANTIFIED, NO PAIN PRESENT: ICD-10-PCS | Mod: S$GLB,,, | Performed by: INTERNAL MEDICINE

## 2020-10-22 PROCEDURE — 3008F BODY MASS INDEX DOCD: CPT | Mod: CPTII,S$GLB,, | Performed by: INTERNAL MEDICINE

## 2020-10-22 PROCEDURE — 1159F MED LIST DOCD IN RCRD: CPT | Mod: S$GLB,,, | Performed by: INTERNAL MEDICINE

## 2020-10-22 PROCEDURE — 80053 COMPREHEN METABOLIC PANEL: CPT

## 2020-10-22 PROCEDURE — 99499 RISK ADDL DX/OHS AUDIT: ICD-10-PCS | Mod: S$GLB,,, | Performed by: INTERNAL MEDICINE

## 2020-10-22 PROCEDURE — 3074F SYST BP LT 130 MM HG: CPT | Mod: CPTII,S$GLB,, | Performed by: INTERNAL MEDICINE

## 2020-10-22 PROCEDURE — 99499 UNLISTED E&M SERVICE: CPT | Mod: S$GLB,,, | Performed by: INTERNAL MEDICINE

## 2020-10-22 PROCEDURE — 99214 PR OFFICE/OUTPT VISIT, EST, LEVL IV, 30-39 MIN: ICD-10-PCS | Mod: S$GLB,,, | Performed by: INTERNAL MEDICINE

## 2020-10-22 PROCEDURE — 3074F PR MOST RECENT SYSTOLIC BLOOD PRESSURE < 130 MM HG: ICD-10-PCS | Mod: CPTII,S$GLB,, | Performed by: INTERNAL MEDICINE

## 2020-10-22 PROCEDURE — 99999 PR PBB SHADOW E&M-EST. PATIENT-LVL IV: ICD-10-PCS | Mod: PBBFAC,,, | Performed by: INTERNAL MEDICINE

## 2020-10-22 NOTE — PROGRESS NOTES
Subjective:       Patient ID: Nicole Jacques is a 70 y.o. female.    Chief Complaint: Hypertension    Last seen 5 months ago. Returns for annual exam doing well. Evaluated by Neurology for tremor, but didn't tolerate the Topamax prescribed.     PMH: , misc x 1.    Hypertension.  Pre-Diabetes, HbA1c up to 6.3%.  GERD, EGD  only showed hiatal hernia.  Depression with Anxiety.    Lumbar Spondylosis.  Mild OA Knees.   Sigmoid Diverticulosis.     Colonoscopy 11/15 normal. Mammogram normal . Pelvic exam . BMD normal . Eye exam . EKG normal . Pneumovax . Prevnar 8/15. Tdap . Flu shot . Shingrix , . Labs 10/19: CBC normal, CMP normal, HbA1c 5.7%, TChol 184, TG 96, HDL 63, .     SOCIAL: No tobacco,  smokes. Social alcohol.  with 2 children. Retired  at [employer].     PSH: BTL, Hysterectomy due to heavy menses, ovaries remain. Left knee arthroscopic surgery due to injury kick boxing. Appendectomy. Bilateral Cataract extractions and Blepharoplasty.     FMH: Father  age 81 with bladder cancer and dementia. Mother  age 78 with hypertension and renal failure, and CHF, possible osteoporosis. Diabetes in grandmothers. Anxiety - mother, sister. Colon cancer in maternal uncle.    Allergies: Bactrim.    Medications: list reviewed and reconciled.             Review of Systems   Constitutional: Negative for activity change, appetite change, fatigue, fever and unexpected weight change.   HENT: Negative for nasal congestion, ear pain, hearing loss, rhinorrhea, sneezing, sore throat, trouble swallowing and voice change.    Eyes: Negative for pain and visual disturbance.   Respiratory: Negative for cough, chest tightness, shortness of breath and wheezing.    Cardiovascular: Negative for chest pain, palpitations and leg swelling.   Gastrointestinal: Negative for abdominal pain, blood in stool, constipation, diarrhea, nausea and vomiting.  "  Genitourinary: Negative for dysuria, frequency, hematuria, pelvic pain and vaginal bleeding.   Musculoskeletal: Positive for arthralgias. Negative for gait problem, joint swelling and myalgias.   Integumentary:  Negative for color change and rash.   Neurological: Positive for tremors. Negative for dizziness, syncope, facial asymmetry, speech difficulty, weakness, numbness and headaches.   Hematological: Negative for adenopathy. Does not bruise/bleed easily.   Psychiatric/Behavioral: Negative for dysphoric mood and sleep disturbance. The patient is not nervous/anxious.          Objective:    /80, Pulse 78, O2 Sat 96%, Ht 5' 5", Wt 167 lbs, BMI=27.8  Physical Exam  Vitals signs reviewed.   Constitutional:       General: She is not in acute distress.     Appearance: Normal appearance. She is well-developed. She is not diaphoretic.   HENT:      Head: Normocephalic and atraumatic.      Right Ear: Tympanic membrane and ear canal normal.      Left Ear: Tympanic membrane and ear canal normal.   Eyes:      General: No scleral icterus.     Extraocular Movements: Extraocular movements intact.      Conjunctiva/sclera: Conjunctivae normal.      Right eye: Right conjunctiva is not injected.      Left eye: Left conjunctiva is not injected.      Pupils: Pupils are equal, round, and reactive to light.   Neck:      Musculoskeletal: Normal range of motion and neck supple.      Thyroid: No thyromegaly.      Vascular: No carotid bruit or JVD.   Cardiovascular:      Rate and Rhythm: Normal rate and regular rhythm.      Pulses: Normal pulses.      Heart sounds: Normal heart sounds. No murmur. No friction rub. No gallop.    Pulmonary:      Effort: Pulmonary effort is normal. No respiratory distress.      Breath sounds: Normal breath sounds. No wheezing, rhonchi or rales.   Abdominal:      General: Bowel sounds are normal. There is no distension.      Palpations: Abdomen is soft. There is no mass.      Tenderness: There is no " abdominal tenderness.   Musculoskeletal: Normal range of motion.         General: No tenderness or deformity.      Right lower leg: No edema.      Left lower leg: No edema.   Lymphadenopathy:      Cervical: No cervical adenopathy.   Skin:     General: Skin is warm and dry.      Coloration: Skin is not pale.      Findings: No erythema or rash.      Nails: There is no clubbing.     Neurological:      General: No focal deficit present.      Mental Status: She is alert and oriented to person, place, and time.      Cranial Nerves: No cranial nerve deficit.      Motor: No abnormal muscle tone.      Coordination: Coordination normal.      Gait: Gait normal.      Deep Tendon Reflexes: Reflexes are normal and symmetric.   Psychiatric:         Mood and Affect: Mood normal.         Behavior: Behavior normal.         Thought Content: Thought content normal.         Judgment: Judgment normal.         Assessment:       1. Essential hypertension    2. Pre-diabetes    3. ROSALIA (generalized anxiety disorder)    4. Colon cancer screening        Plan:       Essential hypertension - controlled, continue present care  -     CBC auto differential; Future; Expected date: 10/22/2020  -     Comprehensive Metabolic Panel; Future; Expected date: 10/22/2020  -     Lipid Panel; Future; Expected date: 10/22/2020  -     meds recently filled.     Pre-diabetes  -     Hemoglobin A1C; Future; Expected date: 10/22/2020    ROSALIA (generalized anxiety disorder)        -     Stable on Paxil and Alprazolam recently refilled.     Colon cancer screening  -     Case request GI: COLONOSCOPY - 5 year f/u due to family history due next month.     Mammogram due in December already ordered by Gyn.

## 2020-10-23 LAB
ESTIMATED AVG GLUCOSE: 114 MG/DL (ref 68–131)
HBA1C MFR BLD HPLC: 5.6 % (ref 4–5.6)

## 2020-10-25 ENCOUNTER — PATIENT MESSAGE (OUTPATIENT)
Dept: PRIMARY CARE CLINIC | Facility: CLINIC | Age: 70
End: 2020-10-25

## 2020-11-23 ENCOUNTER — TELEPHONE (OUTPATIENT)
Dept: OBSTETRICS AND GYNECOLOGY | Facility: CLINIC | Age: 70
End: 2020-11-23

## 2020-11-23 NOTE — TELEPHONE ENCOUNTER
----- Message from Mary Klein sent at 11/23/2020  1:01 PM CST -----  Patient is requesting a call back to discus an update on her lab test, advised patient to speak with billing, however she states that Susan was already working on this. Patient can be reached at [phone].

## 2020-11-25 LAB
CANDIDA RRNA VAG QL PROBE: POSITIVE
G VAGINALIS RRNA GENITAL QL PROBE: NEGATIVE
SPECIMEN STATUS REPORT: NORMAL
T VAGINALIS RRNA GENITAL QL PROBE: NEGATIVE

## 2020-12-04 DIAGNOSIS — B37.9 YEAST INFECTION: Primary | ICD-10-CM

## 2020-12-04 RX ORDER — FLUCONAZOLE 150 MG/1
150 TABLET ORAL ONCE
Qty: 2 TABLET | Refills: 0 | Status: SHIPPED | OUTPATIENT
Start: 2020-12-04 | End: 2020-12-04

## 2020-12-16 ENCOUNTER — TELEPHONE (OUTPATIENT)
Dept: OBSTETRICS AND GYNECOLOGY | Facility: CLINIC | Age: 70
End: 2020-12-16

## 2020-12-16 ENCOUNTER — TELEPHONE (OUTPATIENT)
Dept: UROLOGY | Facility: CLINIC | Age: 70
End: 2020-12-16

## 2020-12-16 NOTE — TELEPHONE ENCOUNTER
----- Message from Brittany Lane sent at 12/16/2020  1:34 PM CST -----  Pt would like a call to discuss an overcharge from some past labs.     Pt can be reached at [phone]

## 2020-12-17 ENCOUNTER — TELEPHONE (OUTPATIENT)
Dept: OBSTETRICS AND GYNECOLOGY | Facility: CLINIC | Age: 70
End: 2020-12-17

## 2020-12-17 NOTE — TELEPHONE ENCOUNTER
----- Message from Susan Flowers LPN sent at 12/16/2020  4:47 PM CST -----  Can you please contact pt due to $339.00 bill.  ----- Message -----  From: Jeannette hC  Sent: 12/16/2020   1:44 PM CST  To: Esperanza Umana A Staff    Patient called very upset. She states Dr. Mata told her she would take care of this 339.00 bill since August. Patient says she is in collections now. Patient insisted on speaking to  personally. Patient would like a call back today at [phone]

## 2020-12-18 ENCOUNTER — TELEPHONE (OUTPATIENT)
Dept: OBSTETRICS AND GYNECOLOGY | Facility: CLINIC | Age: 70
End: 2020-12-18

## 2020-12-18 NOTE — TELEPHONE ENCOUNTER
----- Message from Lisa Zacarias sent at 12/18/2020  3:04 PM CST -----  Regarding: returning missed call   Who Called: OSIEL HOPE [[id number]] Who Left Message for Patient: Mariajose Does the patient know what this is regarding? No Best Call Back Number:[phone] Additional Information:

## 2020-12-23 ENCOUNTER — HOSPITAL ENCOUNTER (OUTPATIENT)
Dept: RADIOLOGY | Facility: HOSPITAL | Age: 70
Discharge: HOME | End: 2020-12-23
Attending: OBSTETRICS & GYNECOLOGY
Payer: MEDICARE

## 2020-12-23 DIAGNOSIS — Z12.31 SCREENING MAMMOGRAM, ENCOUNTER FOR: ICD-10-CM

## 2020-12-23 PROCEDURE — 77067 SCR MAMMO BI INCL CAD: CPT | Mod: 26,,, | Performed by: RADIOLOGY

## 2020-12-23 PROCEDURE — 77063 BREAST TOMOSYNTHESIS BI: CPT | Mod: 26,,, | Performed by: RADIOLOGY

## 2020-12-23 PROCEDURE — 77067 SCR MAMMO BI INCL CAD: CPT | Mod: TC,PN

## 2020-12-23 PROCEDURE — 77067 MAMMO DIGITAL SCREENING BILAT WITH TOMOSYNTHESIS_CAD: ICD-10-PCS | Mod: 26,,, | Performed by: RADIOLOGY

## 2020-12-23 PROCEDURE — 77063 MAMMO DIGITAL SCREENING BILAT WITH TOMOSYNTHESIS_CAD: ICD-10-PCS | Mod: 26,,, | Performed by: RADIOLOGY

## 2021-01-09 ENCOUNTER — IMMUNIZATION (OUTPATIENT)
Dept: INTERNAL MEDICINE | Facility: CLINIC | Age: 71
End: 2021-01-09
Payer: MEDICARE

## 2021-01-09 DIAGNOSIS — Z23 NEED FOR VACCINATION: ICD-10-CM

## 2021-01-09 PROCEDURE — 91300 COVID-19, MRNA, LNP-S, PF, 30 MCG/0.3 ML DOSE VACCINE: CPT | Mod: PBBFAC | Performed by: FAMILY MEDICINE

## 2021-01-22 ENCOUNTER — LAB VISIT (OUTPATIENT)
Dept: PRIMARY CARE CLINIC | Facility: OTHER | Age: 71
End: 2021-01-22
Attending: INTERNAL MEDICINE
Payer: MEDICARE

## 2021-01-22 DIAGNOSIS — Z20.822 ENCOUNTER FOR LABORATORY TESTING FOR COVID-19 VIRUS: ICD-10-CM

## 2021-01-22 PROCEDURE — U0003 INFECTIOUS AGENT DETECTION BY NUCLEIC ACID (DNA OR RNA); SEVERE ACUTE RESPIRATORY SYNDROME CORONAVIRUS 2 (SARS-COV-2) (CORONAVIRUS DISEASE [COVID-19]), AMPLIFIED PROBE TECHNIQUE, MAKING USE OF HIGH THROUGHPUT TECHNOLOGIES AS DESCRIBED BY CMS-2020-01-R: HCPCS

## 2021-01-23 LAB — SARS-COV-2 RNA RESP QL NAA+PROBE: NOT DETECTED

## 2021-01-30 ENCOUNTER — IMMUNIZATION (OUTPATIENT)
Dept: INTERNAL MEDICINE | Facility: CLINIC | Age: 71
End: 2021-01-30
Payer: MEDICARE

## 2021-01-30 DIAGNOSIS — Z23 NEED FOR VACCINATION: Primary | ICD-10-CM

## 2021-01-30 PROCEDURE — 0002A COVID-19, MRNA, LNP-S, PF, 30 MCG/0.3 ML DOSE VACCINE: CPT | Mod: PBBFAC | Performed by: FAMILY MEDICINE

## 2021-01-30 PROCEDURE — 91300 COVID-19, MRNA, LNP-S, PF, 30 MCG/0.3 ML DOSE VACCINE: CPT | Mod: PBBFAC | Performed by: FAMILY MEDICINE

## 2021-02-05 ENCOUNTER — TELEPHONE (OUTPATIENT)
Dept: PRIMARY CARE CLINIC | Facility: CLINIC | Age: 71
End: 2021-02-05

## 2021-03-01 DIAGNOSIS — Z12.11 COLON CANCER SCREENING: Primary | ICD-10-CM

## 2021-03-01 DIAGNOSIS — Z01.818 PRE-OP TESTING: ICD-10-CM

## 2021-03-01 RX ORDER — SODIUM, POTASSIUM,MAG SULFATES 17.5-3.13G
SOLUTION, RECONSTITUTED, ORAL ORAL
Qty: 1 KIT | Refills: 0 | Status: SHIPPED | OUTPATIENT
Start: 2021-03-01 | End: 2021-04-22

## 2021-03-13 ENCOUNTER — LAB VISIT (OUTPATIENT)
Dept: INTERNAL MEDICINE | Facility: CLINIC | Age: 71
End: 2021-03-13
Payer: MEDICARE

## 2021-03-13 DIAGNOSIS — Z01.818 PRE-OP TESTING: ICD-10-CM

## 2021-03-13 LAB — SARS-COV-2 RNA RESP QL NAA+PROBE: NOT DETECTED

## 2021-03-13 PROCEDURE — U0005 INFEC AGEN DETEC AMPLI PROBE: HCPCS | Performed by: FAMILY MEDICINE

## 2021-03-13 PROCEDURE — U0003 INFECTIOUS AGENT DETECTION BY NUCLEIC ACID (DNA OR RNA); SEVERE ACUTE RESPIRATORY SYNDROME CORONAVIRUS 2 (SARS-COV-2) (CORONAVIRUS DISEASE [COVID-19]), AMPLIFIED PROBE TECHNIQUE, MAKING USE OF HIGH THROUGHPUT TECHNOLOGIES AS DESCRIBED BY CMS-2020-01-R: HCPCS | Performed by: FAMILY MEDICINE

## 2021-03-16 ENCOUNTER — HOSPITAL ENCOUNTER (OUTPATIENT)
Facility: HOSPITAL | Age: 71
Discharge: HOME | End: 2021-03-16
Attending: INTERNAL MEDICINE | Admitting: INTERNAL MEDICINE
Payer: MEDICARE

## 2021-03-16 ENCOUNTER — ANESTHESIA (OUTPATIENT)
Dept: ENDOSCOPY | Facility: HOSPITAL | Age: 71
End: 2021-03-16
Payer: MEDICARE

## 2021-03-16 ENCOUNTER — ANESTHESIA EVENT (OUTPATIENT)
Dept: ENDOSCOPY | Facility: HOSPITAL | Age: 71
End: 2021-03-16
Payer: MEDICARE

## 2021-03-16 VITALS
HEIGHT: 65 IN | WEIGHT: 163 LBS | DIASTOLIC BLOOD PRESSURE: 81 MMHG | SYSTOLIC BLOOD PRESSURE: 139 MMHG | BODY MASS INDEX: 27.16 KG/M2 | RESPIRATION RATE: 16 BRPM | OXYGEN SATURATION: 98 % | HEART RATE: 69 BPM | TEMPERATURE: 97 F

## 2021-03-16 DIAGNOSIS — Z12.11 COLON CANCER SCREENING: Primary | ICD-10-CM

## 2021-03-16 PROCEDURE — 63600175 PHARM REV CODE 636 W HCPCS: Performed by: NURSE ANESTHETIST, CERTIFIED REGISTERED

## 2021-03-16 PROCEDURE — G0121 COLON CA SCRN NOT HI RSK IND: HCPCS | Mod: ,,, | Performed by: INTERNAL MEDICINE

## 2021-03-16 PROCEDURE — G0121 COLON CA SCRN NOT HI RSK IND: HCPCS | Performed by: INTERNAL MEDICINE

## 2021-03-16 PROCEDURE — E9220 PRA ENDO ANESTHESIA: ICD-10-PCS | Mod: ,,, | Performed by: NURSE ANESTHETIST, CERTIFIED REGISTERED

## 2021-03-16 PROCEDURE — 37000008 HC ANESTHESIA 1ST 15 MINUTES: Performed by: INTERNAL MEDICINE

## 2021-03-16 PROCEDURE — 25000003 PHARM REV CODE 250: Performed by: INTERNAL MEDICINE

## 2021-03-16 PROCEDURE — 37000009 HC ANESTHESIA EA ADD 15 MINS: Performed by: INTERNAL MEDICINE

## 2021-03-16 PROCEDURE — G0121 COLON CA SCRN NOT HI RSK IND: ICD-10-PCS | Mod: ,,, | Performed by: INTERNAL MEDICINE

## 2021-03-16 PROCEDURE — 25000003 PHARM REV CODE 250: Performed by: NURSE ANESTHETIST, CERTIFIED REGISTERED

## 2021-03-16 PROCEDURE — E9220 PRA ENDO ANESTHESIA: HCPCS | Mod: ,,, | Performed by: NURSE ANESTHETIST, CERTIFIED REGISTERED

## 2021-03-16 RX ORDER — LIDOCAINE HYDROCHLORIDE 20 MG/ML
INJECTION INTRAVENOUS
Status: DISCONTINUED | OUTPATIENT
Start: 2021-03-16 | End: 2021-03-16

## 2021-03-16 RX ORDER — PROPOFOL 10 MG/ML
VIAL (ML) INTRAVENOUS CONTINUOUS PRN
Status: DISCONTINUED | OUTPATIENT
Start: 2021-03-16 | End: 2021-03-16

## 2021-03-16 RX ORDER — SODIUM CHLORIDE 9 MG/ML
INJECTION, SOLUTION INTRAVENOUS CONTINUOUS
Status: DISCONTINUED | OUTPATIENT
Start: 2021-03-16 | End: 2021-03-16 | Stop reason: HOSPADM

## 2021-03-16 RX ORDER — PROPOFOL 10 MG/ML
VIAL (ML) INTRAVENOUS
Status: DISCONTINUED | OUTPATIENT
Start: 2021-03-16 | End: 2021-03-16

## 2021-03-16 RX ADMIN — PROPOFOL 30 MG: 10 INJECTION, EMULSION INTRAVENOUS at 01:03

## 2021-03-16 RX ADMIN — PROPOFOL 150 MCG/KG/MIN: 10 INJECTION, EMULSION INTRAVENOUS at 01:03

## 2021-03-16 RX ADMIN — LIDOCAINE HYDROCHLORIDE 40 MG: 20 INJECTION, SOLUTION INTRAVENOUS at 01:03

## 2021-03-16 RX ADMIN — SODIUM CHLORIDE: 0.9 INJECTION, SOLUTION INTRAVENOUS at 01:03

## 2021-04-22 ENCOUNTER — OFFICE VISIT (OUTPATIENT)
Dept: PRIMARY CARE CLINIC | Facility: CLINIC | Age: 71
End: 2021-04-22
Payer: MEDICARE

## 2021-04-22 VITALS
BODY MASS INDEX: 27.44 KG/M2 | SYSTOLIC BLOOD PRESSURE: 116 MMHG | HEIGHT: 65 IN | DIASTOLIC BLOOD PRESSURE: 66 MMHG | OXYGEN SATURATION: 97 % | WEIGHT: 164.69 LBS | TEMPERATURE: 98 F | HEART RATE: 66 BPM | RESPIRATION RATE: 18 BRPM

## 2021-04-22 DIAGNOSIS — F41.1 GAD (GENERALIZED ANXIETY DISORDER): ICD-10-CM

## 2021-04-22 DIAGNOSIS — R73.03 PRE-DIABETES: ICD-10-CM

## 2021-04-22 DIAGNOSIS — I10 ESSENTIAL HYPERTENSION: Primary | ICD-10-CM

## 2021-04-22 PROCEDURE — 3078F DIAST BP <80 MM HG: CPT | Mod: CPTII,S$GLB,, | Performed by: INTERNAL MEDICINE

## 2021-04-22 PROCEDURE — 3074F SYST BP LT 130 MM HG: CPT | Mod: CPTII,S$GLB,, | Performed by: INTERNAL MEDICINE

## 2021-04-22 PROCEDURE — 99213 PR OFFICE/OUTPT VISIT, EST, LEVL III, 20-29 MIN: ICD-10-PCS | Mod: S$GLB,,, | Performed by: INTERNAL MEDICINE

## 2021-04-22 PROCEDURE — 1126F AMNT PAIN NOTED NONE PRSNT: CPT | Mod: S$GLB,,, | Performed by: INTERNAL MEDICINE

## 2021-04-22 PROCEDURE — 99499 UNLISTED E&M SERVICE: CPT | Mod: S$GLB,,, | Performed by: INTERNAL MEDICINE

## 2021-04-22 PROCEDURE — 3078F PR MOST RECENT DIASTOLIC BLOOD PRESSURE < 80 MM HG: ICD-10-PCS | Mod: CPTII,S$GLB,, | Performed by: INTERNAL MEDICINE

## 2021-04-22 PROCEDURE — 3074F PR MOST RECENT SYSTOLIC BLOOD PRESSURE < 130 MM HG: ICD-10-PCS | Mod: CPTII,S$GLB,, | Performed by: INTERNAL MEDICINE

## 2021-04-22 PROCEDURE — 3008F BODY MASS INDEX DOCD: CPT | Mod: CPTII,S$GLB,, | Performed by: INTERNAL MEDICINE

## 2021-04-22 PROCEDURE — 1159F PR MEDICATION LIST DOCUMENTED IN MEDICAL RECORD: ICD-10-PCS | Mod: S$GLB,,, | Performed by: INTERNAL MEDICINE

## 2021-04-22 PROCEDURE — 99499 RISK ADDL DX/OHS AUDIT: ICD-10-PCS | Mod: S$GLB,,, | Performed by: INTERNAL MEDICINE

## 2021-04-22 PROCEDURE — 3288F FALL RISK ASSESSMENT DOCD: CPT | Mod: CPTII,S$GLB,, | Performed by: INTERNAL MEDICINE

## 2021-04-22 PROCEDURE — 1101F PT FALLS ASSESS-DOCD LE1/YR: CPT | Mod: CPTII,S$GLB,, | Performed by: INTERNAL MEDICINE

## 2021-04-22 PROCEDURE — 1126F PR PAIN SEVERITY QUANTIFIED, NO PAIN PRESENT: ICD-10-PCS | Mod: S$GLB,,, | Performed by: INTERNAL MEDICINE

## 2021-04-22 PROCEDURE — 99213 OFFICE O/P EST LOW 20 MIN: CPT | Mod: S$GLB,,, | Performed by: INTERNAL MEDICINE

## 2021-04-22 PROCEDURE — 3008F PR BODY MASS INDEX (BMI) DOCUMENTED: ICD-10-PCS | Mod: CPTII,S$GLB,, | Performed by: INTERNAL MEDICINE

## 2021-04-22 PROCEDURE — 99999 PR PBB SHADOW E&M-EST. PATIENT-LVL IV: CPT | Mod: PBBFAC,,, | Performed by: INTERNAL MEDICINE

## 2021-04-22 PROCEDURE — 1159F MED LIST DOCD IN RCRD: CPT | Mod: S$GLB,,, | Performed by: INTERNAL MEDICINE

## 2021-04-22 PROCEDURE — 3288F PR FALLS RISK ASSESSMENT DOCUMENTED: ICD-10-PCS | Mod: CPTII,S$GLB,, | Performed by: INTERNAL MEDICINE

## 2021-04-22 PROCEDURE — 99999 PR PBB SHADOW E&M-EST. PATIENT-LVL IV: ICD-10-PCS | Mod: PBBFAC,,, | Performed by: INTERNAL MEDICINE

## 2021-04-22 PROCEDURE — 1101F PR PT FALLS ASSESS DOC 0-1 FALLS W/OUT INJ PAST YR: ICD-10-PCS | Mod: CPTII,S$GLB,, | Performed by: INTERNAL MEDICINE

## 2021-04-22 RX ORDER — AMLODIPINE BESYLATE 10 MG/1
10 TABLET ORAL DAILY
Qty: 90 TABLET | Refills: 2 | Status: SHIPPED | OUTPATIENT
Start: 2021-04-22 | End: 2021-11-16 | Stop reason: SDUPTHER

## 2021-09-22 ENCOUNTER — TELEPHONE (OUTPATIENT)
Dept: OPTOMETRY | Facility: CLINIC | Age: 71
End: 2021-09-22

## 2021-10-25 ENCOUNTER — TELEPHONE (OUTPATIENT)
Dept: PRIMARY CARE CLINIC | Facility: CLINIC | Age: 71
End: 2021-10-25
Payer: MEDICARE

## 2021-10-25 DIAGNOSIS — Z12.31 BREAST CANCER SCREENING BY MAMMOGRAM: Primary | ICD-10-CM

## 2021-11-16 ENCOUNTER — LAB VISIT (OUTPATIENT)
Dept: LAB | Facility: HOSPITAL | Age: 71
End: 2021-11-16
Attending: INTERNAL MEDICINE
Payer: MEDICARE

## 2021-11-16 ENCOUNTER — OFFICE VISIT (OUTPATIENT)
Dept: PRIMARY CARE CLINIC | Facility: CLINIC | Age: 71
End: 2021-11-16
Payer: MEDICARE

## 2021-11-16 VITALS
BODY MASS INDEX: 25.9 KG/M2 | DIASTOLIC BLOOD PRESSURE: 77 MMHG | WEIGHT: 155.44 LBS | HEIGHT: 65 IN | TEMPERATURE: 98 F | SYSTOLIC BLOOD PRESSURE: 123 MMHG | OXYGEN SATURATION: 97 % | RESPIRATION RATE: 18 BRPM | HEART RATE: 73 BPM

## 2021-11-16 DIAGNOSIS — M15.9 PRIMARY OSTEOARTHRITIS INVOLVING MULTIPLE JOINTS: ICD-10-CM

## 2021-11-16 DIAGNOSIS — I10 ESSENTIAL HYPERTENSION: ICD-10-CM

## 2021-11-16 DIAGNOSIS — R10.2 PELVIC PAIN: ICD-10-CM

## 2021-11-16 DIAGNOSIS — R73.03 PRE-DIABETES: ICD-10-CM

## 2021-11-16 DIAGNOSIS — I10 ESSENTIAL HYPERTENSION: Primary | ICD-10-CM

## 2021-11-16 DIAGNOSIS — F41.1 GAD (GENERALIZED ANXIETY DISORDER): ICD-10-CM

## 2021-11-16 PROCEDURE — 1159F MED LIST DOCD IN RCRD: CPT | Mod: CPTII,S$GLB,, | Performed by: INTERNAL MEDICINE

## 2021-11-16 PROCEDURE — 1126F AMNT PAIN NOTED NONE PRSNT: CPT | Mod: CPTII,S$GLB,, | Performed by: INTERNAL MEDICINE

## 2021-11-16 PROCEDURE — 80061 LIPID PANEL: CPT | Performed by: INTERNAL MEDICINE

## 2021-11-16 PROCEDURE — 81001 URINALYSIS AUTO W/SCOPE: CPT | Performed by: INTERNAL MEDICINE

## 2021-11-16 PROCEDURE — 85027 COMPLETE CBC AUTOMATED: CPT | Performed by: INTERNAL MEDICINE

## 2021-11-16 PROCEDURE — 3074F SYST BP LT 130 MM HG: CPT | Mod: CPTII,S$GLB,, | Performed by: INTERNAL MEDICINE

## 2021-11-16 PROCEDURE — 99214 PR OFFICE/OUTPT VISIT, EST, LEVL IV, 30-39 MIN: ICD-10-PCS | Mod: S$GLB,,, | Performed by: INTERNAL MEDICINE

## 2021-11-16 PROCEDURE — 3074F PR MOST RECENT SYSTOLIC BLOOD PRESSURE < 130 MM HG: ICD-10-PCS | Mod: CPTII,S$GLB,, | Performed by: INTERNAL MEDICINE

## 2021-11-16 PROCEDURE — 3288F FALL RISK ASSESSMENT DOCD: CPT | Mod: CPTII,S$GLB,, | Performed by: INTERNAL MEDICINE

## 2021-11-16 PROCEDURE — 99214 OFFICE O/P EST MOD 30 MIN: CPT | Mod: S$GLB,,, | Performed by: INTERNAL MEDICINE

## 2021-11-16 PROCEDURE — 4010F ACE/ARB THERAPY RXD/TAKEN: CPT | Mod: CPTII,S$GLB,, | Performed by: INTERNAL MEDICINE

## 2021-11-16 PROCEDURE — 4010F PR ACE/ARB THEARPY RXD/TAKEN: ICD-10-PCS | Mod: CPTII,S$GLB,, | Performed by: INTERNAL MEDICINE

## 2021-11-16 PROCEDURE — 99999 PR PBB SHADOW E&M-EST. PATIENT-LVL IV: CPT | Mod: PBBFAC,,, | Performed by: INTERNAL MEDICINE

## 2021-11-16 PROCEDURE — 1160F RVW MEDS BY RX/DR IN RCRD: CPT | Mod: CPTII,S$GLB,, | Performed by: INTERNAL MEDICINE

## 2021-11-16 PROCEDURE — 1160F PR REVIEW ALL MEDS BY PRESCRIBER/CLIN PHARMACIST DOCUMENTED: ICD-10-PCS | Mod: CPTII,S$GLB,, | Performed by: INTERNAL MEDICINE

## 2021-11-16 PROCEDURE — 3078F DIAST BP <80 MM HG: CPT | Mod: CPTII,S$GLB,, | Performed by: INTERNAL MEDICINE

## 2021-11-16 PROCEDURE — 3078F PR MOST RECENT DIASTOLIC BLOOD PRESSURE < 80 MM HG: ICD-10-PCS | Mod: CPTII,S$GLB,, | Performed by: INTERNAL MEDICINE

## 2021-11-16 PROCEDURE — 3008F BODY MASS INDEX DOCD: CPT | Mod: CPTII,S$GLB,, | Performed by: INTERNAL MEDICINE

## 2021-11-16 PROCEDURE — 3288F PR FALLS RISK ASSESSMENT DOCUMENTED: ICD-10-PCS | Mod: CPTII,S$GLB,, | Performed by: INTERNAL MEDICINE

## 2021-11-16 PROCEDURE — 80053 COMPREHEN METABOLIC PANEL: CPT | Performed by: INTERNAL MEDICINE

## 2021-11-16 PROCEDURE — 1101F PT FALLS ASSESS-DOCD LE1/YR: CPT | Mod: CPTII,S$GLB,, | Performed by: INTERNAL MEDICINE

## 2021-11-16 PROCEDURE — 1101F PR PT FALLS ASSESS DOC 0-1 FALLS W/OUT INJ PAST YR: ICD-10-PCS | Mod: CPTII,S$GLB,, | Performed by: INTERNAL MEDICINE

## 2021-11-16 PROCEDURE — 83036 HEMOGLOBIN GLYCOSYLATED A1C: CPT | Performed by: INTERNAL MEDICINE

## 2021-11-16 PROCEDURE — 1126F PR PAIN SEVERITY QUANTIFIED, NO PAIN PRESENT: ICD-10-PCS | Mod: CPTII,S$GLB,, | Performed by: INTERNAL MEDICINE

## 2021-11-16 PROCEDURE — 36415 COLL VENOUS BLD VENIPUNCTURE: CPT | Mod: PN | Performed by: INTERNAL MEDICINE

## 2021-11-16 PROCEDURE — 3008F PR BODY MASS INDEX (BMI) DOCUMENTED: ICD-10-PCS | Mod: CPTII,S$GLB,, | Performed by: INTERNAL MEDICINE

## 2021-11-16 PROCEDURE — 99999 PR PBB SHADOW E&M-EST. PATIENT-LVL IV: ICD-10-PCS | Mod: PBBFAC,,, | Performed by: INTERNAL MEDICINE

## 2021-11-16 PROCEDURE — 1159F PR MEDICATION LIST DOCUMENTED IN MEDICAL RECORD: ICD-10-PCS | Mod: CPTII,S$GLB,, | Performed by: INTERNAL MEDICINE

## 2021-11-16 RX ORDER — AMLODIPINE BESYLATE 10 MG/1
10 TABLET ORAL DAILY
Qty: 90 TABLET | Refills: 1 | Status: SHIPPED | OUTPATIENT
Start: 2021-11-16 | End: 2022-05-19 | Stop reason: SDUPTHER

## 2021-11-16 RX ORDER — ALPRAZOLAM 2 MG/1
2 TABLET ORAL 2 TIMES DAILY PRN
Qty: 60 TABLET | Refills: 5 | Status: SHIPPED | OUTPATIENT
Start: 2021-11-16 | End: 2022-05-19 | Stop reason: SDUPTHER

## 2021-11-17 ENCOUNTER — PATIENT MESSAGE (OUTPATIENT)
Dept: PRIMARY CARE CLINIC | Facility: CLINIC | Age: 71
End: 2021-11-17
Payer: MEDICARE

## 2021-11-17 ENCOUNTER — OFFICE VISIT (OUTPATIENT)
Dept: OBSTETRICS AND GYNECOLOGY | Facility: CLINIC | Age: 71
End: 2021-11-17
Payer: MEDICARE

## 2021-11-17 VITALS
WEIGHT: 154.56 LBS | BODY MASS INDEX: 25.75 KG/M2 | HEIGHT: 65 IN | SYSTOLIC BLOOD PRESSURE: 126 MMHG | DIASTOLIC BLOOD PRESSURE: 80 MMHG

## 2021-11-17 DIAGNOSIS — N95.2 POSTMENOPAUSAL ATROPHIC VAGINITIS: ICD-10-CM

## 2021-11-17 DIAGNOSIS — R32 URINARY INCONTINENCE, UNSPECIFIED TYPE: ICD-10-CM

## 2021-11-17 DIAGNOSIS — Z01.419 ENCOUNTER FOR GYNECOLOGICAL EXAMINATION WITHOUT ABNORMAL FINDING: Primary | ICD-10-CM

## 2021-11-17 DIAGNOSIS — D64.9 NORMOCYTIC ANEMIA: Primary | ICD-10-CM

## 2021-11-17 DIAGNOSIS — Z12.31 SCREENING MAMMOGRAM, ENCOUNTER FOR: ICD-10-CM

## 2021-11-17 DIAGNOSIS — N95.1 MENOPAUSAL SYMPTOMS: ICD-10-CM

## 2021-11-17 DIAGNOSIS — D53.9 NUTRITIONAL ANEMIA, UNSPECIFIED: ICD-10-CM

## 2021-11-17 LAB
ALBUMIN SERPL BCP-MCNC: 3.9 G/DL (ref 3.5–5.2)
ALP SERPL-CCNC: 50 U/L (ref 55–135)
ALT SERPL W/O P-5'-P-CCNC: 18 U/L (ref 10–44)
AMORPH CRY UR QL COMP ASSIST: ABNORMAL
ANION GAP SERPL CALC-SCNC: 11 MMOL/L (ref 8–16)
AST SERPL-CCNC: 23 U/L (ref 10–40)
BACTERIA #/AREA URNS AUTO: ABNORMAL /HPF
BILIRUB SERPL-MCNC: 0.5 MG/DL (ref 0.1–1)
BILIRUB UR QL STRIP: NEGATIVE
BUN SERPL-MCNC: 22 MG/DL (ref 8–23)
CALCIUM SERPL-MCNC: 9.8 MG/DL (ref 8.7–10.5)
CHLORIDE SERPL-SCNC: 102 MMOL/L (ref 95–110)
CHOLEST SERPL-MCNC: 182 MG/DL (ref 120–199)
CHOLEST/HDLC SERPL: 2.8 {RATIO} (ref 2–5)
CLARITY UR REFRACT.AUTO: ABNORMAL
CO2 SERPL-SCNC: 27 MMOL/L (ref 23–29)
COLOR UR AUTO: ABNORMAL
CREAT SERPL-MCNC: 0.8 MG/DL (ref 0.5–1.4)
ERYTHROCYTE [DISTWIDTH] IN BLOOD BY AUTOMATED COUNT: 13.2 % (ref 11.5–14.5)
EST. GFR  (AFRICAN AMERICAN): >60 ML/MIN/1.73 M^2
EST. GFR  (NON AFRICAN AMERICAN): >60 ML/MIN/1.73 M^2
ESTIMATED AVG GLUCOSE: 120 MG/DL (ref 68–131)
GLUCOSE SERPL-MCNC: 87 MG/DL (ref 70–110)
GLUCOSE UR QL STRIP: NEGATIVE
HBA1C MFR BLD: 5.8 % (ref 4–5.6)
HCT VFR BLD AUTO: 35.5 % (ref 37–48.5)
HDLC SERPL-MCNC: 65 MG/DL (ref 40–75)
HDLC SERPL: 35.7 % (ref 20–50)
HGB BLD-MCNC: 11.7 G/DL (ref 12–16)
HGB UR QL STRIP: NEGATIVE
KETONES UR QL STRIP: NEGATIVE
LDLC SERPL CALC-MCNC: 101.6 MG/DL (ref 63–159)
LEUKOCYTE ESTERASE UR QL STRIP: NEGATIVE
MCH RBC QN AUTO: 31.1 PG (ref 27–31)
MCHC RBC AUTO-ENTMCNC: 33 G/DL (ref 32–36)
MCV RBC AUTO: 94 FL (ref 82–98)
MICROSCOPIC COMMENT: ABNORMAL
NITRITE UR QL STRIP: NEGATIVE
NONHDLC SERPL-MCNC: 117 MG/DL
PH UR STRIP: 5 [PH] (ref 5–8)
PLATELET # BLD AUTO: 186 K/UL (ref 150–450)
PMV BLD AUTO: 11 FL (ref 9.2–12.9)
POTASSIUM SERPL-SCNC: 3.9 MMOL/L (ref 3.5–5.1)
PROT SERPL-MCNC: 7.9 G/DL (ref 6–8.4)
PROT UR QL STRIP: NEGATIVE
RBC # BLD AUTO: 3.76 M/UL (ref 4–5.4)
SODIUM SERPL-SCNC: 140 MMOL/L (ref 136–145)
SP GR UR STRIP: 1.02 (ref 1–1.03)
TRIGL SERPL-MCNC: 77 MG/DL (ref 30–150)
URN SPEC COLLECT METH UR: ABNORMAL
WBC # BLD AUTO: 3.78 K/UL (ref 3.9–12.7)

## 2021-11-17 PROCEDURE — 3044F HG A1C LEVEL LT 7.0%: CPT | Mod: CPTII,S$GLB,, | Performed by: OBSTETRICS & GYNECOLOGY

## 2021-11-17 PROCEDURE — 4010F PR ACE/ARB THEARPY RXD/TAKEN: ICD-10-PCS | Mod: CPTII,S$GLB,, | Performed by: OBSTETRICS & GYNECOLOGY

## 2021-11-17 PROCEDURE — 1101F PR PT FALLS ASSESS DOC 0-1 FALLS W/OUT INJ PAST YR: ICD-10-PCS | Mod: CPTII,S$GLB,, | Performed by: OBSTETRICS & GYNECOLOGY

## 2021-11-17 PROCEDURE — 1126F PR PAIN SEVERITY QUANTIFIED, NO PAIN PRESENT: ICD-10-PCS | Mod: CPTII,S$GLB,, | Performed by: OBSTETRICS & GYNECOLOGY

## 2021-11-17 PROCEDURE — 1160F RVW MEDS BY RX/DR IN RCRD: CPT | Mod: CPTII,S$GLB,, | Performed by: OBSTETRICS & GYNECOLOGY

## 2021-11-17 PROCEDURE — 3044F PR MOST RECENT HEMOGLOBIN A1C LEVEL <7.0%: ICD-10-PCS | Mod: CPTII,S$GLB,, | Performed by: OBSTETRICS & GYNECOLOGY

## 2021-11-17 PROCEDURE — 3288F FALL RISK ASSESSMENT DOCD: CPT | Mod: CPTII,S$GLB,, | Performed by: OBSTETRICS & GYNECOLOGY

## 2021-11-17 PROCEDURE — 99999 PR PBB SHADOW E&M-EST. PATIENT-LVL III: CPT | Mod: PBBFAC,,, | Performed by: OBSTETRICS & GYNECOLOGY

## 2021-11-17 PROCEDURE — 1160F PR REVIEW ALL MEDS BY PRESCRIBER/CLIN PHARMACIST DOCUMENTED: ICD-10-PCS | Mod: CPTII,S$GLB,, | Performed by: OBSTETRICS & GYNECOLOGY

## 2021-11-17 PROCEDURE — 3079F PR MOST RECENT DIASTOLIC BLOOD PRESSURE 80-89 MM HG: ICD-10-PCS | Mod: CPTII,S$GLB,, | Performed by: OBSTETRICS & GYNECOLOGY

## 2021-11-17 PROCEDURE — 4010F ACE/ARB THERAPY RXD/TAKEN: CPT | Mod: CPTII,S$GLB,, | Performed by: OBSTETRICS & GYNECOLOGY

## 2021-11-17 PROCEDURE — 99999 PR PBB SHADOW E&M-EST. PATIENT-LVL III: ICD-10-PCS | Mod: PBBFAC,,, | Performed by: OBSTETRICS & GYNECOLOGY

## 2021-11-17 PROCEDURE — G0101 CA SCREEN;PELVIC/BREAST EXAM: HCPCS | Mod: S$GLB,,, | Performed by: OBSTETRICS & GYNECOLOGY

## 2021-11-17 PROCEDURE — 3079F DIAST BP 80-89 MM HG: CPT | Mod: CPTII,S$GLB,, | Performed by: OBSTETRICS & GYNECOLOGY

## 2021-11-17 PROCEDURE — 3008F BODY MASS INDEX DOCD: CPT | Mod: CPTII,S$GLB,, | Performed by: OBSTETRICS & GYNECOLOGY

## 2021-11-17 PROCEDURE — G0101 PR CA SCREEN;PELVIC/BREAST EXAM: ICD-10-PCS | Mod: S$GLB,,, | Performed by: OBSTETRICS & GYNECOLOGY

## 2021-11-17 PROCEDURE — 3008F PR BODY MASS INDEX (BMI) DOCUMENTED: ICD-10-PCS | Mod: CPTII,S$GLB,, | Performed by: OBSTETRICS & GYNECOLOGY

## 2021-11-17 PROCEDURE — 1159F MED LIST DOCD IN RCRD: CPT | Mod: CPTII,S$GLB,, | Performed by: OBSTETRICS & GYNECOLOGY

## 2021-11-17 PROCEDURE — 3074F SYST BP LT 130 MM HG: CPT | Mod: CPTII,S$GLB,, | Performed by: OBSTETRICS & GYNECOLOGY

## 2021-11-17 PROCEDURE — 3074F PR MOST RECENT SYSTOLIC BLOOD PRESSURE < 130 MM HG: ICD-10-PCS | Mod: CPTII,S$GLB,, | Performed by: OBSTETRICS & GYNECOLOGY

## 2021-11-17 PROCEDURE — 1101F PT FALLS ASSESS-DOCD LE1/YR: CPT | Mod: CPTII,S$GLB,, | Performed by: OBSTETRICS & GYNECOLOGY

## 2021-11-17 PROCEDURE — 3288F PR FALLS RISK ASSESSMENT DOCUMENTED: ICD-10-PCS | Mod: CPTII,S$GLB,, | Performed by: OBSTETRICS & GYNECOLOGY

## 2021-11-17 PROCEDURE — 87086 URINE CULTURE/COLONY COUNT: CPT | Performed by: OBSTETRICS & GYNECOLOGY

## 2021-11-17 PROCEDURE — 1159F PR MEDICATION LIST DOCUMENTED IN MEDICAL RECORD: ICD-10-PCS | Mod: CPTII,S$GLB,, | Performed by: OBSTETRICS & GYNECOLOGY

## 2021-11-17 PROCEDURE — 1126F AMNT PAIN NOTED NONE PRSNT: CPT | Mod: CPTII,S$GLB,, | Performed by: OBSTETRICS & GYNECOLOGY

## 2021-11-17 RX ORDER — ESTRADIOL 1 MG/1
1 TABLET ORAL DAILY
Qty: 90 TABLET | Refills: 3 | Status: SHIPPED | OUTPATIENT
Start: 2021-11-17 | End: 2023-01-25 | Stop reason: SDUPTHER

## 2021-11-17 RX ORDER — ESTRADIOL 0.1 MG/G
1 CREAM VAGINAL EVERY OTHER DAY
Qty: 42 G | Refills: 3 | Status: SHIPPED | OUTPATIENT
Start: 2021-11-17 | End: 2022-11-17

## 2021-11-18 LAB — BACTERIA UR CULT: NO GROWTH

## 2021-11-22 ENCOUNTER — LAB VISIT (OUTPATIENT)
Dept: LAB | Facility: HOSPITAL | Age: 71
End: 2021-11-22
Attending: INTERNAL MEDICINE
Payer: MEDICARE

## 2021-11-22 DIAGNOSIS — D53.9 NUTRITIONAL ANEMIA, UNSPECIFIED: ICD-10-CM

## 2021-11-22 DIAGNOSIS — D64.9 NORMOCYTIC ANEMIA: ICD-10-CM

## 2021-11-22 PROCEDURE — 84466 ASSAY OF TRANSFERRIN: CPT | Performed by: INTERNAL MEDICINE

## 2021-11-22 PROCEDURE — 36415 COLL VENOUS BLD VENIPUNCTURE: CPT | Mod: PN | Performed by: INTERNAL MEDICINE

## 2021-11-22 PROCEDURE — 82746 ASSAY OF FOLIC ACID SERUM: CPT | Performed by: INTERNAL MEDICINE

## 2021-11-22 PROCEDURE — 82728 ASSAY OF FERRITIN: CPT | Performed by: INTERNAL MEDICINE

## 2021-11-22 PROCEDURE — 85045 AUTOMATED RETICULOCYTE COUNT: CPT | Performed by: INTERNAL MEDICINE

## 2021-11-22 PROCEDURE — 82607 VITAMIN B-12: CPT | Performed by: INTERNAL MEDICINE

## 2021-11-23 ENCOUNTER — TELEPHONE (OUTPATIENT)
Dept: PRIMARY CARE CLINIC | Facility: CLINIC | Age: 71
End: 2021-11-23
Payer: MEDICARE

## 2021-11-23 ENCOUNTER — PATIENT MESSAGE (OUTPATIENT)
Dept: PRIMARY CARE CLINIC | Facility: CLINIC | Age: 71
End: 2021-11-23
Payer: MEDICARE

## 2021-11-23 DIAGNOSIS — D64.9 NORMOCYTIC ANEMIA: Primary | ICD-10-CM

## 2021-11-23 LAB
FERRITIN SERPL-MCNC: 121 NG/ML (ref 20–300)
FOLATE SERPL-MCNC: 13.9 NG/ML (ref 4–24)
IRON SERPL-MCNC: 72 UG/DL (ref 30–160)
RETICS/RBC NFR AUTO: 1.3 % (ref 0.5–2.5)
SATURATED IRON: 18 % (ref 20–50)
TOTAL IRON BINDING CAPACITY: 392 UG/DL (ref 250–450)
TRANSFERRIN SERPL-MCNC: 265 MG/DL (ref 200–375)
VIT B12 SERPL-MCNC: 520 PG/ML (ref 210–950)

## 2021-11-23 RX ORDER — FERROUS GLUCONATE 324(38)MG
324 TABLET ORAL
Qty: 90 TABLET | Refills: 0 | Status: SHIPPED | OUTPATIENT
Start: 2021-11-23

## 2021-11-24 ENCOUNTER — TELEPHONE (OUTPATIENT)
Dept: OBSTETRICS AND GYNECOLOGY | Facility: CLINIC | Age: 71
End: 2021-11-24
Payer: MEDICARE

## 2021-12-01 ENCOUNTER — PES CALL (OUTPATIENT)
Dept: ADMINISTRATIVE | Facility: CLINIC | Age: 71
End: 2021-12-01
Payer: MEDICARE

## 2021-12-27 ENCOUNTER — HOSPITAL ENCOUNTER (OUTPATIENT)
Dept: RADIOLOGY | Facility: HOSPITAL | Age: 71
Discharge: HOME | End: 2021-12-27
Attending: INTERNAL MEDICINE
Payer: MEDICARE

## 2021-12-27 DIAGNOSIS — Z12.31 BREAST CANCER SCREENING BY MAMMOGRAM: ICD-10-CM

## 2021-12-27 PROCEDURE — 77067 SCR MAMMO BI INCL CAD: CPT | Mod: TC,PN

## 2021-12-27 PROCEDURE — 77067 MAMMO DIGITAL SCREENING BILAT WITH TOMO: ICD-10-PCS | Mod: 26,,, | Performed by: RADIOLOGY

## 2021-12-27 PROCEDURE — 77063 MAMMO DIGITAL SCREENING BILAT WITH TOMO: ICD-10-PCS | Mod: 26,,, | Performed by: RADIOLOGY

## 2021-12-27 PROCEDURE — 77063 BREAST TOMOSYNTHESIS BI: CPT | Mod: 26,,, | Performed by: RADIOLOGY

## 2021-12-27 PROCEDURE — 77067 SCR MAMMO BI INCL CAD: CPT | Mod: 26,,, | Performed by: RADIOLOGY

## 2022-01-28 ENCOUNTER — PES CALL (OUTPATIENT)
Dept: ADMINISTRATIVE | Facility: CLINIC | Age: 72
End: 2022-01-28
Payer: MEDICARE

## 2022-02-08 ENCOUNTER — LAB VISIT (OUTPATIENT)
Dept: LAB | Facility: HOSPITAL | Age: 72
End: 2022-02-08
Attending: INTERNAL MEDICINE
Payer: MEDICARE

## 2022-02-08 DIAGNOSIS — D64.9 NORMOCYTIC ANEMIA: ICD-10-CM

## 2022-02-08 LAB
ERYTHROCYTE [DISTWIDTH] IN BLOOD BY AUTOMATED COUNT: 13.2 % (ref 11.5–14.5)
HCT VFR BLD AUTO: 38 % (ref 37–48.5)
HGB BLD-MCNC: 12.4 G/DL (ref 12–16)
IRON SERPL-MCNC: 92 UG/DL (ref 30–160)
MCH RBC QN AUTO: 31.3 PG (ref 27–31)
MCHC RBC AUTO-ENTMCNC: 32.6 G/DL (ref 32–36)
MCV RBC AUTO: 96 FL (ref 82–98)
PLATELET # BLD AUTO: 247 K/UL (ref 150–450)
PMV BLD AUTO: 11.2 FL (ref 9.2–12.9)
RBC # BLD AUTO: 3.96 M/UL (ref 4–5.4)
SATURATED IRON: 23 % (ref 20–50)
TOTAL IRON BINDING CAPACITY: 408 UG/DL (ref 250–450)
TRANSFERRIN SERPL-MCNC: 276 MG/DL (ref 200–375)
WBC # BLD AUTO: 3.96 K/UL (ref 3.9–12.7)

## 2022-02-08 PROCEDURE — 36415 COLL VENOUS BLD VENIPUNCTURE: CPT | Mod: PN | Performed by: INTERNAL MEDICINE

## 2022-02-08 PROCEDURE — 84466 ASSAY OF TRANSFERRIN: CPT | Performed by: INTERNAL MEDICINE

## 2022-02-08 PROCEDURE — 85027 COMPLETE CBC AUTOMATED: CPT | Performed by: INTERNAL MEDICINE

## 2022-02-09 ENCOUNTER — PATIENT MESSAGE (OUTPATIENT)
Dept: PRIMARY CARE CLINIC | Facility: CLINIC | Age: 72
End: 2022-02-09
Payer: MEDICARE

## 2022-02-24 ENCOUNTER — PATIENT MESSAGE (OUTPATIENT)
Dept: PRIMARY CARE CLINIC | Facility: CLINIC | Age: 72
End: 2022-02-24
Payer: MEDICARE

## 2022-05-06 NOTE — TELEPHONE ENCOUNTER
Called pt no answer left a vm to inform her that she does not need to come in on 09/02/2020 to see Dr Mata again. Dr Mata's recommendation is to be seen in 1 year unless pt has problems appt will be cancelled.   Detail Level: Zone Initiate Treatment: imiquimod 5 % topical cream packet \\nQuantity: 1.0 Packet  Days Supply: 30\\nSig: Apply a peasized amount to affected areas on face once daily on Monday, And Thursday for 12 weeks. Detail Level: Generalized Plan: Recommended sunscreen, long sleeves and a hat

## 2022-05-19 ENCOUNTER — OFFICE VISIT (OUTPATIENT)
Dept: PRIMARY CARE CLINIC | Facility: CLINIC | Age: 72
End: 2022-05-19
Payer: MEDICARE

## 2022-05-19 ENCOUNTER — LAB VISIT (OUTPATIENT)
Dept: LAB | Facility: HOSPITAL | Age: 72
End: 2022-05-19
Attending: INTERNAL MEDICINE
Payer: MEDICARE

## 2022-05-19 VITALS
OXYGEN SATURATION: 97 % | WEIGHT: 159.38 LBS | BODY MASS INDEX: 26.55 KG/M2 | DIASTOLIC BLOOD PRESSURE: 64 MMHG | HEART RATE: 74 BPM | SYSTOLIC BLOOD PRESSURE: 120 MMHG | TEMPERATURE: 98 F | HEIGHT: 65 IN

## 2022-05-19 DIAGNOSIS — I10 ESSENTIAL HYPERTENSION: ICD-10-CM

## 2022-05-19 DIAGNOSIS — R41.3 MEMORY IMPAIRMENT: ICD-10-CM

## 2022-05-19 DIAGNOSIS — I10 ESSENTIAL HYPERTENSION: Primary | ICD-10-CM

## 2022-05-19 DIAGNOSIS — M15.9 PRIMARY OSTEOARTHRITIS INVOLVING MULTIPLE JOINTS: ICD-10-CM

## 2022-05-19 DIAGNOSIS — R73.03 PRE-DIABETES: ICD-10-CM

## 2022-05-19 DIAGNOSIS — F41.1 GAD (GENERALIZED ANXIETY DISORDER): ICD-10-CM

## 2022-05-19 DIAGNOSIS — F33.0 MAJOR DEPRESSIVE DISORDER, RECURRENT, MILD: ICD-10-CM

## 2022-05-19 LAB
ANION GAP SERPL CALC-SCNC: 7 MMOL/L (ref 8–16)
BUN SERPL-MCNC: 23 MG/DL (ref 8–23)
CALCIUM SERPL-MCNC: 9.5 MG/DL (ref 8.7–10.5)
CHLORIDE SERPL-SCNC: 105 MMOL/L (ref 95–110)
CO2 SERPL-SCNC: 27 MMOL/L (ref 23–29)
CREAT SERPL-MCNC: 0.8 MG/DL (ref 0.5–1.4)
ERYTHROCYTE [DISTWIDTH] IN BLOOD BY AUTOMATED COUNT: 13.3 % (ref 11.5–14.5)
EST. GFR  (AFRICAN AMERICAN): >60 ML/MIN/1.73 M^2
EST. GFR  (NON AFRICAN AMERICAN): >60 ML/MIN/1.73 M^2
GLUCOSE SERPL-MCNC: 101 MG/DL (ref 70–110)
HCT VFR BLD AUTO: 36.3 % (ref 37–48.5)
HGB BLD-MCNC: 11.8 G/DL (ref 12–16)
MCH RBC QN AUTO: 30.2 PG (ref 27–31)
MCHC RBC AUTO-ENTMCNC: 32.5 G/DL (ref 32–36)
MCV RBC AUTO: 93 FL (ref 82–98)
PLATELET # BLD AUTO: 230 K/UL (ref 150–450)
PMV BLD AUTO: 11.8 FL (ref 9.2–12.9)
POTASSIUM SERPL-SCNC: 4 MMOL/L (ref 3.5–5.1)
RBC # BLD AUTO: 3.91 M/UL (ref 4–5.4)
SODIUM SERPL-SCNC: 139 MMOL/L (ref 136–145)
WBC # BLD AUTO: 4.14 K/UL (ref 3.9–12.7)

## 2022-05-19 PROCEDURE — 1160F RVW MEDS BY RX/DR IN RCRD: CPT | Mod: CPTII,S$GLB,, | Performed by: INTERNAL MEDICINE

## 2022-05-19 PROCEDURE — 80048 BASIC METABOLIC PNL TOTAL CA: CPT | Performed by: INTERNAL MEDICINE

## 2022-05-19 PROCEDURE — 3288F FALL RISK ASSESSMENT DOCD: CPT | Mod: CPTII,S$GLB,, | Performed by: INTERNAL MEDICINE

## 2022-05-19 PROCEDURE — 99999 PR PBB SHADOW E&M-EST. PATIENT-LVL V: CPT | Mod: PBBFAC,,, | Performed by: INTERNAL MEDICINE

## 2022-05-19 PROCEDURE — 3288F PR FALLS RISK ASSESSMENT DOCUMENTED: ICD-10-PCS | Mod: CPTII,S$GLB,, | Performed by: INTERNAL MEDICINE

## 2022-05-19 PROCEDURE — 36415 COLL VENOUS BLD VENIPUNCTURE: CPT | Mod: PN | Performed by: INTERNAL MEDICINE

## 2022-05-19 PROCEDURE — 4010F PR ACE/ARB THEARPY RXD/TAKEN: ICD-10-PCS | Mod: CPTII,S$GLB,, | Performed by: INTERNAL MEDICINE

## 2022-05-19 PROCEDURE — 3008F PR BODY MASS INDEX (BMI) DOCUMENTED: ICD-10-PCS | Mod: CPTII,S$GLB,, | Performed by: INTERNAL MEDICINE

## 2022-05-19 PROCEDURE — 1101F PR PT FALLS ASSESS DOC 0-1 FALLS W/OUT INJ PAST YR: ICD-10-PCS | Mod: CPTII,S$GLB,, | Performed by: INTERNAL MEDICINE

## 2022-05-19 PROCEDURE — 3074F SYST BP LT 130 MM HG: CPT | Mod: CPTII,S$GLB,, | Performed by: INTERNAL MEDICINE

## 2022-05-19 PROCEDURE — 3078F PR MOST RECENT DIASTOLIC BLOOD PRESSURE < 80 MM HG: ICD-10-PCS | Mod: CPTII,S$GLB,, | Performed by: INTERNAL MEDICINE

## 2022-05-19 PROCEDURE — 99499 RISK ADDL DX/OHS AUDIT: ICD-10-PCS | Mod: S$GLB,,, | Performed by: INTERNAL MEDICINE

## 2022-05-19 PROCEDURE — 1126F AMNT PAIN NOTED NONE PRSNT: CPT | Mod: CPTII,S$GLB,, | Performed by: INTERNAL MEDICINE

## 2022-05-19 PROCEDURE — 3008F BODY MASS INDEX DOCD: CPT | Mod: CPTII,S$GLB,, | Performed by: INTERNAL MEDICINE

## 2022-05-19 PROCEDURE — 3078F DIAST BP <80 MM HG: CPT | Mod: CPTII,S$GLB,, | Performed by: INTERNAL MEDICINE

## 2022-05-19 PROCEDURE — 1159F MED LIST DOCD IN RCRD: CPT | Mod: CPTII,S$GLB,, | Performed by: INTERNAL MEDICINE

## 2022-05-19 PROCEDURE — 1101F PT FALLS ASSESS-DOCD LE1/YR: CPT | Mod: CPTII,S$GLB,, | Performed by: INTERNAL MEDICINE

## 2022-05-19 PROCEDURE — 85027 COMPLETE CBC AUTOMATED: CPT | Performed by: INTERNAL MEDICINE

## 2022-05-19 PROCEDURE — 99214 PR OFFICE/OUTPT VISIT, EST, LEVL IV, 30-39 MIN: ICD-10-PCS | Mod: S$GLB,,, | Performed by: INTERNAL MEDICINE

## 2022-05-19 PROCEDURE — 3074F PR MOST RECENT SYSTOLIC BLOOD PRESSURE < 130 MM HG: ICD-10-PCS | Mod: CPTII,S$GLB,, | Performed by: INTERNAL MEDICINE

## 2022-05-19 PROCEDURE — 4010F ACE/ARB THERAPY RXD/TAKEN: CPT | Mod: CPTII,S$GLB,, | Performed by: INTERNAL MEDICINE

## 2022-05-19 PROCEDURE — 99214 OFFICE O/P EST MOD 30 MIN: CPT | Mod: S$GLB,,, | Performed by: INTERNAL MEDICINE

## 2022-05-19 PROCEDURE — 99499 UNLISTED E&M SERVICE: CPT | Mod: S$GLB,,, | Performed by: INTERNAL MEDICINE

## 2022-05-19 PROCEDURE — 99999 PR PBB SHADOW E&M-EST. PATIENT-LVL V: ICD-10-PCS | Mod: PBBFAC,,, | Performed by: INTERNAL MEDICINE

## 2022-05-19 PROCEDURE — 1160F PR REVIEW ALL MEDS BY PRESCRIBER/CLIN PHARMACIST DOCUMENTED: ICD-10-PCS | Mod: CPTII,S$GLB,, | Performed by: INTERNAL MEDICINE

## 2022-05-19 PROCEDURE — 1159F PR MEDICATION LIST DOCUMENTED IN MEDICAL RECORD: ICD-10-PCS | Mod: CPTII,S$GLB,, | Performed by: INTERNAL MEDICINE

## 2022-05-19 PROCEDURE — 1126F PR PAIN SEVERITY QUANTIFIED, NO PAIN PRESENT: ICD-10-PCS | Mod: CPTII,S$GLB,, | Performed by: INTERNAL MEDICINE

## 2022-05-19 RX ORDER — AMLODIPINE BESYLATE 10 MG/1
10 TABLET ORAL DAILY
Qty: 90 TABLET | Refills: 2 | Status: SHIPPED | OUTPATIENT
Start: 2022-05-19 | End: 2022-11-22 | Stop reason: SDUPTHER

## 2022-05-19 RX ORDER — ALPRAZOLAM 2 MG/1
2 TABLET ORAL 2 TIMES DAILY PRN
Qty: 60 TABLET | Refills: 5 | Status: SHIPPED | OUTPATIENT
Start: 2022-06-14 | End: 2022-12-07
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
## 2022-05-20 ENCOUNTER — TELEPHONE (OUTPATIENT)
Dept: NEUROLOGY | Facility: CLINIC | Age: 72
End: 2022-05-20
Payer: MEDICARE

## 2022-05-20 NOTE — TELEPHONE ENCOUNTER
----- Message from Tessy Banda sent at 5/20/2022 11:15 AM CDT -----  Dr. Zara Sorensen has put in a referral for Consult to Neurology. Please assist in scheduling.    Memory impairment [R41.3]    Thanks

## 2022-05-23 ENCOUNTER — PATIENT MESSAGE (OUTPATIENT)
Dept: PRIMARY CARE CLINIC | Facility: CLINIC | Age: 72
End: 2022-05-23
Payer: MEDICARE

## 2022-05-23 ENCOUNTER — TELEPHONE (OUTPATIENT)
Dept: PRIMARY CARE CLINIC | Facility: CLINIC | Age: 72
End: 2022-05-23

## 2022-05-23 NOTE — TELEPHONE ENCOUNTER
----- Message from Nakia Ace sent at 5/23/2022  3:30 PM CDT -----  Contact: pt [phone]  Patient states she has been taking her iron supplements everyday and levels are continue to be low please advise. Patient did not see A1C results.  Please call and advise.    Thank you and have a great day.

## 2022-06-06 ENCOUNTER — TELEPHONE (OUTPATIENT)
Dept: PRIMARY CARE CLINIC | Facility: CLINIC | Age: 72
End: 2022-06-06
Payer: MEDICARE

## 2022-06-06 DIAGNOSIS — N39.41 URGE URINARY INCONTINENCE: ICD-10-CM

## 2022-06-06 NOTE — TELEPHONE ENCOUNTER
Pt is requesting to start back on the oxybutynin 5 mg tab. The last prescription was prescribed in 2017 but she still has pills left. Pt state the reason she stopped the medication because it caused her to have a dry mouth. Pt state she is taking half and it seem to be working and not having her mouth dry

## 2022-06-06 NOTE — TELEPHONE ENCOUNTER
----- Message from Lauryn Ferrer sent at 6/6/2022 10:30 AM CDT -----  Contact: Pt [phone]  Requesting an RX refill or new RX.  Is this a refill or new RX: refill   RX name and strength (copy/paste from chart):  oxybutynin (DITROPAN) 5 MG Tab  Is this a 30 day or 90 day RX: 90  Pharmacy name and phone # (copy/paste from chart):    [facility]  ([email or web address]) - [address], [address]  [address] [place] [address], [address] [address]  Phone: [phone] Fax: [phone]

## 2022-06-09 ENCOUNTER — OFFICE VISIT (OUTPATIENT)
Dept: NEUROLOGY | Facility: CLINIC | Age: 72
End: 2022-06-09
Payer: MEDICARE

## 2022-06-09 ENCOUNTER — TELEPHONE (OUTPATIENT)
Dept: NEUROLOGY | Facility: CLINIC | Age: 72
End: 2022-06-09
Payer: MEDICARE

## 2022-06-09 VITALS
DIASTOLIC BLOOD PRESSURE: 59 MMHG | WEIGHT: 160.25 LBS | HEIGHT: 65 IN | BODY MASS INDEX: 26.7 KG/M2 | HEART RATE: 79 BPM | SYSTOLIC BLOOD PRESSURE: 121 MMHG

## 2022-06-09 DIAGNOSIS — G25.0 BENIGN ESSENTIAL TREMOR: ICD-10-CM

## 2022-06-09 DIAGNOSIS — R41.3 MEMORY IMPAIRMENT: ICD-10-CM

## 2022-06-09 PROCEDURE — 1101F PT FALLS ASSESS-DOCD LE1/YR: CPT | Mod: CPTII,S$GLB,, | Performed by: NEUROMUSCULOSKELETAL MEDICINE & OMM

## 2022-06-09 PROCEDURE — 3288F PR FALLS RISK ASSESSMENT DOCUMENTED: ICD-10-PCS | Mod: CPTII,S$GLB,, | Performed by: NEUROMUSCULOSKELETAL MEDICINE & OMM

## 2022-06-09 PROCEDURE — 1101F PR PT FALLS ASSESS DOC 0-1 FALLS W/OUT INJ PAST YR: ICD-10-PCS | Mod: CPTII,S$GLB,, | Performed by: NEUROMUSCULOSKELETAL MEDICINE & OMM

## 2022-06-09 PROCEDURE — 4010F PR ACE/ARB THEARPY RXD/TAKEN: ICD-10-PCS | Mod: CPTII,S$GLB,, | Performed by: NEUROMUSCULOSKELETAL MEDICINE & OMM

## 2022-06-09 PROCEDURE — 99999 PR PBB SHADOW E&M-EST. PATIENT-LVL III: CPT | Mod: PBBFAC,,, | Performed by: NEUROMUSCULOSKELETAL MEDICINE & OMM

## 2022-06-09 PROCEDURE — 1126F PR PAIN SEVERITY QUANTIFIED, NO PAIN PRESENT: ICD-10-PCS | Mod: CPTII,S$GLB,, | Performed by: NEUROMUSCULOSKELETAL MEDICINE & OMM

## 2022-06-09 PROCEDURE — 99214 PR OFFICE/OUTPT VISIT, EST, LEVL IV, 30-39 MIN: ICD-10-PCS | Mod: S$GLB,,, | Performed by: NEUROMUSCULOSKELETAL MEDICINE & OMM

## 2022-06-09 PROCEDURE — 3008F BODY MASS INDEX DOCD: CPT | Mod: CPTII,S$GLB,, | Performed by: NEUROMUSCULOSKELETAL MEDICINE & OMM

## 2022-06-09 PROCEDURE — 1159F PR MEDICATION LIST DOCUMENTED IN MEDICAL RECORD: ICD-10-PCS | Mod: CPTII,S$GLB,, | Performed by: NEUROMUSCULOSKELETAL MEDICINE & OMM

## 2022-06-09 PROCEDURE — 3078F DIAST BP <80 MM HG: CPT | Mod: CPTII,S$GLB,, | Performed by: NEUROMUSCULOSKELETAL MEDICINE & OMM

## 2022-06-09 PROCEDURE — 3008F PR BODY MASS INDEX (BMI) DOCUMENTED: ICD-10-PCS | Mod: CPTII,S$GLB,, | Performed by: NEUROMUSCULOSKELETAL MEDICINE & OMM

## 2022-06-09 PROCEDURE — 3288F FALL RISK ASSESSMENT DOCD: CPT | Mod: CPTII,S$GLB,, | Performed by: NEUROMUSCULOSKELETAL MEDICINE & OMM

## 2022-06-09 PROCEDURE — 1126F AMNT PAIN NOTED NONE PRSNT: CPT | Mod: CPTII,S$GLB,, | Performed by: NEUROMUSCULOSKELETAL MEDICINE & OMM

## 2022-06-09 PROCEDURE — 3074F SYST BP LT 130 MM HG: CPT | Mod: CPTII,S$GLB,, | Performed by: NEUROMUSCULOSKELETAL MEDICINE & OMM

## 2022-06-09 PROCEDURE — 4010F ACE/ARB THERAPY RXD/TAKEN: CPT | Mod: CPTII,S$GLB,, | Performed by: NEUROMUSCULOSKELETAL MEDICINE & OMM

## 2022-06-09 PROCEDURE — 1159F MED LIST DOCD IN RCRD: CPT | Mod: CPTII,S$GLB,, | Performed by: NEUROMUSCULOSKELETAL MEDICINE & OMM

## 2022-06-09 PROCEDURE — 3078F PR MOST RECENT DIASTOLIC BLOOD PRESSURE < 80 MM HG: ICD-10-PCS | Mod: CPTII,S$GLB,, | Performed by: NEUROMUSCULOSKELETAL MEDICINE & OMM

## 2022-06-09 PROCEDURE — 3074F PR MOST RECENT SYSTOLIC BLOOD PRESSURE < 130 MM HG: ICD-10-PCS | Mod: CPTII,S$GLB,, | Performed by: NEUROMUSCULOSKELETAL MEDICINE & OMM

## 2022-06-09 PROCEDURE — 99214 OFFICE O/P EST MOD 30 MIN: CPT | Mod: S$GLB,,, | Performed by: NEUROMUSCULOSKELETAL MEDICINE & OMM

## 2022-06-09 PROCEDURE — 99999 PR PBB SHADOW E&M-EST. PATIENT-LVL III: ICD-10-PCS | Mod: PBBFAC,,, | Performed by: NEUROMUSCULOSKELETAL MEDICINE & OMM

## 2022-06-09 RX ORDER — PRAMIPEXOLE DIHYDROCHLORIDE 0.12 MG/1
0.12 TABLET ORAL 3 TIMES DAILY
Qty: 90 TABLET | Refills: 0 | Status: SHIPPED | OUTPATIENT
Start: 2022-06-09 | End: 2022-10-27

## 2022-06-09 NOTE — TELEPHONE ENCOUNTER
----- Message from Narayan Cortez sent at 6/9/2022  3:38 PM CDT -----  PATIENT CALLED RUNNING LATE BY 10MINS
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
## 2022-06-10 ENCOUNTER — TELEPHONE (OUTPATIENT)
Dept: PRIMARY CARE CLINIC | Facility: CLINIC | Age: 72
End: 2022-06-10

## 2022-06-10 NOTE — TELEPHONE ENCOUNTER
----- Message from Lauryn Ferrer sent at 6/10/2022  1:11 PM CDT -----  Contact: Pt [phone]  Pt calling to follow up on the message regarding oxybutynin 5 mg tab.    Please call

## 2022-06-22 RX ORDER — OXYBUTYNIN CHLORIDE 5 MG/1
5 TABLET ORAL 2 TIMES DAILY
Qty: 180 TABLET | Refills: 1 | Status: SHIPPED | OUTPATIENT
Start: 2022-06-22 | End: 2023-05-26 | Stop reason: SINTOL

## 2022-07-27 ENCOUNTER — PES CALL (OUTPATIENT)
Dept: ADMINISTRATIVE | Facility: CLINIC | Age: 72
End: 2022-07-27
Payer: MEDICARE

## 2022-08-29 ENCOUNTER — TELEPHONE (OUTPATIENT)
Dept: PRIMARY CARE CLINIC | Facility: CLINIC | Age: 72
End: 2022-08-29
Payer: MEDICARE

## 2022-08-29 NOTE — TELEPHONE ENCOUNTER
----- Message from Vane Isabel sent at 8/29/2022 11:31 AM CDT -----  Contact: Pt [phone]  Patient is requesting orders for the Bone Density Test be entered in her chart so that she can complete the test before she see's you again.    Please call and advise when they are able to be scheduled.    Thank You

## 2022-09-29 ENCOUNTER — TELEPHONE (OUTPATIENT)
Dept: PRIMARY CARE CLINIC | Facility: CLINIC | Age: 72
End: 2022-09-29
Payer: MEDICARE

## 2022-09-29 ENCOUNTER — NURSE TRIAGE (OUTPATIENT)
Dept: ADMINISTRATIVE | Facility: CLINIC | Age: 72
End: 2022-09-29
Payer: MEDICARE

## 2022-09-29 NOTE — TELEPHONE ENCOUNTER
----- Message from Patty Minda sent at 9/29/2022  4:15 PM CDT -----  Name Of Caller:Nicole            Provider Name:Zara Sorensen            Does patient feel the need to be seen today?No            Relationship to the Pt?:PT            Contact Preference?:[phone]            What is the nature of the call?: Pt needs a referral for an ENT doctor. Pt is experiencing dizziness and would like a call back ASAP.Pt would like a sooner appt time as well.

## 2022-09-29 NOTE — TELEPHONE ENCOUNTER
"Pt c/o intermittent dizziness & feeling off balance; feeling more forgetful more than usual x weeks. Pt aaox3, able to answer questions appropriately. Slowed, clear speech noted.     Pt walked her usual 3 miles yesterday & noticed she felt off balance, dizzy, unsteady on feet. Lasts ab a min or so, then resolves. Episodes happen throughout day. Denies episodes ever causing her to feel as if she would fall or pass out, "just a little off balance". Denies blurred vision/double vision.     Currently /81 HR 67.     Pt seen by neuro on 6/9 for symptoms. Placed on mirapex, but states did not help & no longer taking.     Advised to be seen within 24 hrs. Pt states only apt available tomorrow was 830am, pt unable to make that time, she prefers to f/u in nearby  later in the day tomorrow after husbands apts. Agrees to callback if symptoms change/worsen.     Reason for Disposition   [1] MODERATE dizziness (e.g., interferes with normal activities) AND [2] has NOT been evaluated by physician for this  (Exception: dizziness caused by heat exposure, sudden standing, or poor fluid intake)    Additional Information   Negative: SEVERE difficulty breathing (e.g., struggling for each breath, speaks in single words)   Negative: [1] Difficulty breathing or swallowing AND [2] started suddenly after medicine, an allergic food or bee sting   Negative: Shock suspected (e.g., cold/pale/clammy skin, too weak to stand, low BP, rapid pulse)   Negative: Difficult to awaken or acting confused (e.g., disoriented, slurred speech)   Negative: [1] Weakness (i.e., paralysis, loss of muscle strength) of the face, arm or leg on one side of the body AND [2] sudden onset AND [3] present now   Negative: [1] Numbness (i.e., loss of sensation) of the face, arm or leg on one side of the body AND [2] sudden onset AND [3] present now   Negative: [1] Loss of speech or garbled speech AND [2] sudden onset AND [3] present now   Negative: Overdose " "(accidental or intentional) of medications   Negative: [1] Fainted > 15 minutes ago AND [2] still feels too weak or dizzy to stand   Negative: Heart beating < 50 beats per minute OR > 140 beats per minute   Negative: Sounds like a life-threatening emergency to the triager   Negative: Difficulty breathing   Negative: SEVERE dizziness (e.g., unable to stand, requires support to walk, feels like passing out now)   Negative: Extra heart beats OR irregular heart beating (i.e., "palpitations")   Negative: [1] Drinking very little AND [2] dehydration suspected (e.g., no urine > 12 hours, very dry mouth, very lightheaded)   Negative: [1] Numbness (i.e., loss of sensation) of the face, arm / hand, or leg / foot on one side of the body AND [2] sudden onset AND [3] brief (now gone)   Negative: [1] Weakness (i.e., paralysis, loss of muscle strength) of the face, arm / hand, or leg / foot on one side of the body AND [2] sudden onset AND [3] brief (now gone)   Negative: [1] Loss of speech or garbled speech AND [2] sudden onset AND [3] brief (now gone)   Negative: Loss of vision or double vision (Exception: similar to previous migraines)   Negative: Patient sounds very sick or weak to the triager   Negative: [1] Dizziness caused by heat exposure, sudden standing, or poor fluid intake AND [2] no improvement after 2 hours of rest and fluids   Negative: [1] Fever > 103 F (39.4 C) AND [2] not able to get the fever down using Fever Care Advice   Negative: [1] Fever > 101 F (38.3 C) AND [2] age > 60 years   Negative: [1] Fever > 100.0 F (37.8 C) AND [2] bedridden (e.g., nursing home patient, CVA, chronic illness, recovering from surgery)   Negative: [1] Fever > 100.0 F (37.8 C) AND [2] diabetes mellitus or weak immune system (e.g., HIV positive, cancer chemo, splenectomy, organ transplant, chronic steroids)    Protocols used: Dizziness - [id number]    "

## 2022-09-29 NOTE — TELEPHONE ENCOUNTER
Mrs Jacques called stating she has been feeling dizzy and off balance for a few weeks. Sometimes when she wake up she is dizzy and off balance as well  no chest pain SOB, arm or face tingling .pt requesting to see ENT but I advised pt to call the nurse on call to see if she can be evaluated in clinic or she should go to the ED number given

## 2022-09-30 NOTE — TELEPHONE ENCOUNTER
----- Message from Iris Banda sent at 9/30/2022  1:18 PM CDT -----  Contact: pt [phone]  Pt is has been having issues with dizziness and somewhat off balance lately. The first available appt with  is 01/10/23. Pt does not want to see another provider. Pt would like for a Kylie to call her about a sooner appt.          Thank you

## 2022-09-30 NOTE — TELEPHONE ENCOUNTER
Pt requesting appt to see DR Sorensen ONLY I advised pt Dr Sorensen do not have any appointments available, pt state she was told to go to the urgent care clinic she refused I offered pt appt with another provider PT REFUSED pt state to place her on the wait list for a sooner appt.

## 2022-10-11 ENCOUNTER — PES CALL (OUTPATIENT)
Dept: ADMINISTRATIVE | Facility: CLINIC | Age: 72
End: 2022-10-11
Payer: MEDICARE

## 2022-10-27 ENCOUNTER — OFFICE VISIT (OUTPATIENT)
Dept: PRIMARY CARE CLINIC | Facility: CLINIC | Age: 72
End: 2022-10-27
Payer: MEDICARE

## 2022-10-27 ENCOUNTER — LAB VISIT (OUTPATIENT)
Dept: LAB | Facility: HOSPITAL | Age: 72
End: 2022-10-27
Attending: INTERNAL MEDICINE
Payer: MEDICARE

## 2022-10-27 VITALS
HEART RATE: 77 BPM | TEMPERATURE: 99 F | SYSTOLIC BLOOD PRESSURE: 118 MMHG | OXYGEN SATURATION: 94 % | WEIGHT: 156.94 LBS | BODY MASS INDEX: 26.15 KG/M2 | HEIGHT: 65 IN | DIASTOLIC BLOOD PRESSURE: 74 MMHG

## 2022-10-27 DIAGNOSIS — I10 ESSENTIAL HYPERTENSION: ICD-10-CM

## 2022-10-27 DIAGNOSIS — G31.84 MCI (MILD COGNITIVE IMPAIRMENT) WITH MEMORY LOSS: Primary | ICD-10-CM

## 2022-10-27 DIAGNOSIS — R73.03 PRE-DIABETES: ICD-10-CM

## 2022-10-27 DIAGNOSIS — F41.1 GAD (GENERALIZED ANXIETY DISORDER): ICD-10-CM

## 2022-10-27 DIAGNOSIS — F43.21 ADJUSTMENT DISORDER WITH DEPRESSED MOOD: ICD-10-CM

## 2022-10-27 DIAGNOSIS — R42 DIZZINESS AND GIDDINESS: ICD-10-CM

## 2022-10-27 DIAGNOSIS — R41.3 OTHER AMNESIA: ICD-10-CM

## 2022-10-27 DIAGNOSIS — G31.84 MCI (MILD COGNITIVE IMPAIRMENT) WITH MEMORY LOSS: ICD-10-CM

## 2022-10-27 LAB
ALBUMIN SERPL BCP-MCNC: 3.7 G/DL (ref 3.5–5.2)
ALP SERPL-CCNC: 49 U/L (ref 55–135)
ALT SERPL W/O P-5'-P-CCNC: 13 U/L (ref 10–44)
ANION GAP SERPL CALC-SCNC: 9 MMOL/L (ref 8–16)
AST SERPL-CCNC: 26 U/L (ref 10–40)
BILIRUB SERPL-MCNC: 0.3 MG/DL (ref 0.1–1)
BUN SERPL-MCNC: 20 MG/DL (ref 8–23)
CALCIUM SERPL-MCNC: 9.2 MG/DL (ref 8.7–10.5)
CHLORIDE SERPL-SCNC: 103 MMOL/L (ref 95–110)
CHOLEST SERPL-MCNC: 177 MG/DL (ref 120–199)
CHOLEST/HDLC SERPL: 2.8 {RATIO} (ref 2–5)
CO2 SERPL-SCNC: 26 MMOL/L (ref 23–29)
CREAT SERPL-MCNC: 0.9 MG/DL (ref 0.5–1.4)
ERYTHROCYTE [DISTWIDTH] IN BLOOD BY AUTOMATED COUNT: 12.8 % (ref 11.5–14.5)
EST. GFR  (NO RACE VARIABLE): >60 ML/MIN/1.73 M^2
ESTIMATED AVG GLUCOSE: 114 MG/DL (ref 68–131)
GLUCOSE SERPL-MCNC: 78 MG/DL (ref 70–110)
HBA1C MFR BLD: 5.6 % (ref 4–5.6)
HCT VFR BLD AUTO: 36.5 % (ref 37–48.5)
HDLC SERPL-MCNC: 64 MG/DL (ref 40–75)
HDLC SERPL: 36.2 % (ref 20–50)
HGB BLD-MCNC: 11.7 G/DL (ref 12–16)
LDLC SERPL CALC-MCNC: 87.8 MG/DL (ref 63–159)
MCH RBC QN AUTO: 31 PG (ref 27–31)
MCHC RBC AUTO-ENTMCNC: 32.1 G/DL (ref 32–36)
MCV RBC AUTO: 97 FL (ref 82–98)
NONHDLC SERPL-MCNC: 113 MG/DL
PLATELET # BLD AUTO: 207 K/UL (ref 150–450)
PMV BLD AUTO: 12 FL (ref 9.2–12.9)
POTASSIUM SERPL-SCNC: 4 MMOL/L (ref 3.5–5.1)
PROT SERPL-MCNC: 8.1 G/DL (ref 6–8.4)
RBC # BLD AUTO: 3.78 M/UL (ref 4–5.4)
SODIUM SERPL-SCNC: 138 MMOL/L (ref 136–145)
TRIGL SERPL-MCNC: 126 MG/DL (ref 30–150)
TSH SERPL DL<=0.005 MIU/L-ACNC: 1 UIU/ML (ref 0.4–4)
WBC # BLD AUTO: 3.6 K/UL (ref 3.9–12.7)

## 2022-10-27 PROCEDURE — 99214 OFFICE O/P EST MOD 30 MIN: CPT | Mod: S$GLB,,, | Performed by: INTERNAL MEDICINE

## 2022-10-27 PROCEDURE — 80053 COMPREHEN METABOLIC PANEL: CPT | Performed by: INTERNAL MEDICINE

## 2022-10-27 PROCEDURE — 1126F AMNT PAIN NOTED NONE PRSNT: CPT | Mod: CPTII,S$GLB,, | Performed by: INTERNAL MEDICINE

## 2022-10-27 PROCEDURE — 1126F PR PAIN SEVERITY QUANTIFIED, NO PAIN PRESENT: ICD-10-PCS | Mod: CPTII,S$GLB,, | Performed by: INTERNAL MEDICINE

## 2022-10-27 PROCEDURE — 1101F PR PT FALLS ASSESS DOC 0-1 FALLS W/OUT INJ PAST YR: ICD-10-PCS | Mod: CPTII,S$GLB,, | Performed by: INTERNAL MEDICINE

## 2022-10-27 PROCEDURE — 85027 COMPLETE CBC AUTOMATED: CPT | Performed by: INTERNAL MEDICINE

## 2022-10-27 PROCEDURE — 99999 PR PBB SHADOW E&M-EST. PATIENT-LVL V: CPT | Mod: PBBFAC,,, | Performed by: INTERNAL MEDICINE

## 2022-10-27 PROCEDURE — 3074F PR MOST RECENT SYSTOLIC BLOOD PRESSURE < 130 MM HG: ICD-10-PCS | Mod: CPTII,S$GLB,, | Performed by: INTERNAL MEDICINE

## 2022-10-27 PROCEDURE — 3288F FALL RISK ASSESSMENT DOCD: CPT | Mod: CPTII,S$GLB,, | Performed by: INTERNAL MEDICINE

## 2022-10-27 PROCEDURE — 3044F HG A1C LEVEL LT 7.0%: CPT | Mod: CPTII,S$GLB,, | Performed by: INTERNAL MEDICINE

## 2022-10-27 PROCEDURE — 3008F PR BODY MASS INDEX (BMI) DOCUMENTED: ICD-10-PCS | Mod: CPTII,S$GLB,, | Performed by: INTERNAL MEDICINE

## 2022-10-27 PROCEDURE — 1160F RVW MEDS BY RX/DR IN RCRD: CPT | Mod: CPTII,S$GLB,, | Performed by: INTERNAL MEDICINE

## 2022-10-27 PROCEDURE — 1101F PT FALLS ASSESS-DOCD LE1/YR: CPT | Mod: CPTII,S$GLB,, | Performed by: INTERNAL MEDICINE

## 2022-10-27 PROCEDURE — 3078F DIAST BP <80 MM HG: CPT | Mod: CPTII,S$GLB,, | Performed by: INTERNAL MEDICINE

## 2022-10-27 PROCEDURE — 83036 HEMOGLOBIN GLYCOSYLATED A1C: CPT | Performed by: INTERNAL MEDICINE

## 2022-10-27 PROCEDURE — 3078F PR MOST RECENT DIASTOLIC BLOOD PRESSURE < 80 MM HG: ICD-10-PCS | Mod: CPTII,S$GLB,, | Performed by: INTERNAL MEDICINE

## 2022-10-27 PROCEDURE — 80061 LIPID PANEL: CPT | Performed by: INTERNAL MEDICINE

## 2022-10-27 PROCEDURE — 4010F PR ACE/ARB THEARPY RXD/TAKEN: ICD-10-PCS | Mod: CPTII,S$GLB,, | Performed by: INTERNAL MEDICINE

## 2022-10-27 PROCEDURE — 3044F PR MOST RECENT HEMOGLOBIN A1C LEVEL <7.0%: ICD-10-PCS | Mod: CPTII,S$GLB,, | Performed by: INTERNAL MEDICINE

## 2022-10-27 PROCEDURE — 1159F PR MEDICATION LIST DOCUMENTED IN MEDICAL RECORD: ICD-10-PCS | Mod: CPTII,S$GLB,, | Performed by: INTERNAL MEDICINE

## 2022-10-27 PROCEDURE — 99999 PR PBB SHADOW E&M-EST. PATIENT-LVL V: ICD-10-PCS | Mod: PBBFAC,,, | Performed by: INTERNAL MEDICINE

## 2022-10-27 PROCEDURE — 3074F SYST BP LT 130 MM HG: CPT | Mod: CPTII,S$GLB,, | Performed by: INTERNAL MEDICINE

## 2022-10-27 PROCEDURE — 1159F MED LIST DOCD IN RCRD: CPT | Mod: CPTII,S$GLB,, | Performed by: INTERNAL MEDICINE

## 2022-10-27 PROCEDURE — 4010F ACE/ARB THERAPY RXD/TAKEN: CPT | Mod: CPTII,S$GLB,, | Performed by: INTERNAL MEDICINE

## 2022-10-27 PROCEDURE — 36415 COLL VENOUS BLD VENIPUNCTURE: CPT | Mod: PN | Performed by: INTERNAL MEDICINE

## 2022-10-27 PROCEDURE — 99214 PR OFFICE/OUTPT VISIT, EST, LEVL IV, 30-39 MIN: ICD-10-PCS | Mod: S$GLB,,, | Performed by: INTERNAL MEDICINE

## 2022-10-27 PROCEDURE — 3288F PR FALLS RISK ASSESSMENT DOCUMENTED: ICD-10-PCS | Mod: CPTII,S$GLB,, | Performed by: INTERNAL MEDICINE

## 2022-10-27 PROCEDURE — 84443 ASSAY THYROID STIM HORMONE: CPT | Performed by: INTERNAL MEDICINE

## 2022-10-27 PROCEDURE — 1160F PR REVIEW ALL MEDS BY PRESCRIBER/CLIN PHARMACIST DOCUMENTED: ICD-10-PCS | Mod: CPTII,S$GLB,, | Performed by: INTERNAL MEDICINE

## 2022-10-27 PROCEDURE — 3008F BODY MASS INDEX DOCD: CPT | Mod: CPTII,S$GLB,, | Performed by: INTERNAL MEDICINE

## 2022-10-27 RX ORDER — ESCITALOPRAM OXALATE 20 MG/1
20 TABLET ORAL DAILY
Qty: 90 TABLET | Refills: 1 | Status: SHIPPED | OUTPATIENT
Start: 2022-10-27 | End: 2023-03-05

## 2022-10-28 ENCOUNTER — PATIENT MESSAGE (OUTPATIENT)
Dept: PRIMARY CARE CLINIC | Facility: CLINIC | Age: 72
End: 2022-10-28
Payer: MEDICARE

## 2022-10-31 ENCOUNTER — TELEPHONE (OUTPATIENT)
Dept: PRIMARY CARE CLINIC | Facility: CLINIC | Age: 72
End: 2022-10-31

## 2022-10-31 NOTE — TELEPHONE ENCOUNTER
----- Message from Tomasa Breen sent at 10/31/2022  3:08 PM CDT -----  Contact: [phone]  Pt is calling in regards to a brain scan and she states she can not get on her portal please give return call

## 2022-11-02 ENCOUNTER — PES CALL (OUTPATIENT)
Dept: ADMINISTRATIVE | Facility: CLINIC | Age: 72
End: 2022-11-02
Payer: MEDICARE

## 2022-11-07 NOTE — PROGRESS NOTES
Subjective:       Patient ID: Nicole Jacques is a 72 y.o. female.    Chief Complaint: Anxiety    Last seen 5 months ago with hypertension controlled, c/o memory impairment with anxiety and depression. She declined change in medication, continued Paxil and Xanax the same. Referred to Neurology where she was seen in , but feels her concerns were not addressed. Returns today earlier than planned c/o worsening memory impairment (short term), increased anxiety and now dizziness described as dysequilibrium when ambulating, yet she is still able to walk 3 miles for exercise, no falls. And she is getting together with her tennis partner again, but is a bit deconditioned having not played during the pandemic.    PMH: , misc x 1.    Hypertension.  Pre-Diabetes, HbA1c up to 6.3%.  GERD, EGD  only showed hiatal hernia.  Depression with Anxiety.    Lumbar Spondylosis.  Mild OA Knees.   Sigmoid Diverticulosis.     Colonoscopy 3/21 normal, repeat 10 yrs. Mammogram normal . Pelvic exam . BMD normal . Eye exam . EKG normal . Pneumovax . Prevnar 8/15. Tdap . Flu shot 10/21. Shingrix , . COVID (Pfizer) x 4 + Bivalent booster.    SOCIAL: No tobacco,  smokes. Social alcohol.  with 2 children. 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     PSH: BTL, Hysterectomy due to heavy menses, ovaries remain. Left knee arthroscopic surgery due to injury kick boxing. Appendectomy. Bilateral Cataract extractions and Blepharoplasty.     FMH: Father  age 81 with bladder cancer and dementia. Mother  age 78 with hypertension and renal failure, and CHF, possible osteoporosis. Diabetes in grandmothers. Anxiety - mother, sister. Colon cancer in maternal uncle.    Allergies: Bactrim.    Medications: list reviewed and reconciled.             Review of Systems   Constitutional:  Negative for fatigue, fever and unexpected weight change.   HENT:  Negative for ear pain, hearing loss  "and tinnitus.    Eyes:  Negative for redness and visual disturbance.   Respiratory:  Negative for cough and shortness of breath.    Cardiovascular:  Negative for chest pain, palpitations and leg swelling.   Gastrointestinal:  Negative for abdominal pain, diarrhea, nausea and vomiting.   Neurological:  Positive for dizziness, tremors, coordination difficulties, memory loss and coordination difficulties. Negative for vertigo, seizures, syncope, facial asymmetry, speech difficulty, weakness, numbness and headaches.   Psychiatric/Behavioral:  Positive for decreased concentration and dysphoric mood. Negative for agitation, behavioral problems, confusion and suicidal ideas. The patient is nervous/anxious.        Objective:      Vitals:    10/27/22 1323   BP: 118/74   BP Location: Right arm   Patient Position: Sitting   BP Method: Medium (Manual)   Pulse: 77   Temp: 98.7 °F (37.1 °C)   TempSrc: Oral   SpO2: (!) 94%   Weight: 71.2 kg (156 lb 15.5 oz)    From 159   Height: 5' 5" (1.651 m)   BMI=26  Physical Exam  Constitutional:       General: She is not in acute distress.     Appearance: She is not ill-appearing.      Comments: Well groomed, ambulatory without aid, here alone.   HENT:      Head: Normocephalic and atraumatic.      Right Ear: Tympanic membrane and ear canal normal.      Left Ear: Tympanic membrane and ear canal normal.      Nose: Nose normal. No congestion.      Mouth/Throat:      Mouth: Mucous membranes are moist.      Pharynx: Oropharynx is clear.   Eyes:      Extraocular Movements: Extraocular movements intact.      Conjunctiva/sclera: Conjunctivae normal.   Neck:      Vascular: No carotid bruit.   Cardiovascular:      Rate and Rhythm: Normal rate and regular rhythm.      Heart sounds: Normal heart sounds.   Pulmonary:      Effort: Pulmonary effort is normal. No respiratory distress.      Breath sounds: Normal breath sounds.   Abdominal:      General: There is no distension.      Palpations: Abdomen is " soft.      Tenderness: There is no abdominal tenderness.   Musculoskeletal:         General: Normal range of motion.      Cervical back: Normal range of motion and neck supple.      Right lower leg: No edema.      Left lower leg: No edema.   Skin:     General: Skin is warm and dry.   Neurological:      Mental Status: She is alert.      Cranial Nerves: No cranial nerve deficit or facial asymmetry.      Motor: Tremor present. No weakness, atrophy or abnormal muscle tone.      Coordination: Coordination normal.      Gait: Gait normal.   Psychiatric:         Attention and Perception: Attention normal.         Mood and Affect: Affect normal. Mood is anxious.         Speech: Speech normal.         Behavior: Behavior normal.         Thought Content: Thought content normal.       Assessment:       Problem List Items Addressed This Visit       ROSALIA (generalized anxiety disorder)    Relevant Medications    EScitalopram oxalate (LEXAPRO) 20 MG tablet    Pre-diabetes     Other Visit Diagnoses       MCI (mild cognitive impairment) with memory loss    -  Primary    Relevant Orders    MRI Brain W WO Contrast    Dizziness and giddiness        Relevant Orders    MRI Brain W WO Contrast    Other amnesia        Relevant Orders    MRI Brain W WO Contrast    Adjustment disorder with depressed mood        Relevant Medications    EScitalopram oxalate (LEXAPRO) 20 MG tablet    Essential hypertension                  Plan:       MCI (mild cognitive impairment) with memory loss  -     MRI Brain W WO Contrast; Future; Expected date: 10/27/2022  -     TSH; Future; Expected date: 10/27/2022    Dizziness and giddiness  -     MRI Brain W WO Contrast; Future; Expected date: 10/27/2022  -     TSH; Future; Expected date: 10/27/2022    Other amnesia  -     MRI Brain W WO Contrast; Future; Expected date: 10/27/2022    Adjustment disorder with depressed mood  -     Change Paxil to EScitalopram oxalate (LEXAPRO) 20 MG tablet; Take 1 tablet (20 mg total)  by mouth once daily.  Dispense: 90 tablet; Refill: 1    ROSALIA (generalized anxiety disorder)  -     Change Paxil to EScitalopram oxalate (LEXAPRO) 20 MG tablet; Take 1 tablet (20 mg total) by mouth once daily.  Dispense: 90 tablet; Refill: 1    Essential hypertension controlled.  -     CBC Without Differential; Future; Expected date: 10/27/2022  -     Comprehensive Metabolic Panel; Future; Expected date: 10/27/2022  -     Lipid Panel; Future; Expected date: 10/27/2022    Pre-diabetes  -     Hemoglobin A1C; Future; Expected date: 10/27/2022

## 2022-11-11 ENCOUNTER — PES CALL (OUTPATIENT)
Dept: ADMINISTRATIVE | Facility: CLINIC | Age: 72
End: 2022-11-11
Payer: MEDICARE

## 2022-11-16 ENCOUNTER — TELEPHONE (OUTPATIENT)
Dept: PRIMARY CARE CLINIC | Facility: CLINIC | Age: 72
End: 2022-11-16
Payer: MEDICARE

## 2022-11-16 NOTE — TELEPHONE ENCOUNTER
Pt asking about preparations on MRI pt advised she will follow the preparation steps printed with appt. Pt VU

## 2022-11-16 NOTE — TELEPHONE ENCOUNTER
----- Message from Shane Combs sent at 11/15/2022  3:46 PM CST -----  Contact: Pt [phone]  The patient wants to know if she will be needing anesthesia for the MRI brain.    Thank you

## 2022-11-18 ENCOUNTER — HOSPITAL ENCOUNTER (OUTPATIENT)
Dept: RADIOLOGY | Facility: HOSPITAL | Age: 72
Discharge: HOME | End: 2022-11-18
Attending: INTERNAL MEDICINE
Payer: MEDICARE

## 2022-11-18 DIAGNOSIS — R41.3 OTHER AMNESIA: ICD-10-CM

## 2022-11-18 DIAGNOSIS — R42 DIZZINESS AND GIDDINESS: ICD-10-CM

## 2022-11-18 DIAGNOSIS — G31.84 MCI (MILD COGNITIVE IMPAIRMENT) WITH MEMORY LOSS: ICD-10-CM

## 2022-11-18 PROCEDURE — 70553 MRI BRAIN STEM W/O & W/DYE: CPT | Mod: TC

## 2022-11-18 PROCEDURE — 70553 MRI BRAIN STEM W/O & W/DYE: CPT | Mod: 26,,, | Performed by: RADIOLOGY

## 2022-11-18 PROCEDURE — 70553 MRI BRAIN W WO CONTRAST: ICD-10-PCS | Mod: 26,,, | Performed by: RADIOLOGY

## 2022-11-18 PROCEDURE — 25500020 PHARM REV CODE 255: Performed by: INTERNAL MEDICINE

## 2022-11-18 PROCEDURE — A9585 GADOBUTROL INJECTION: HCPCS | Performed by: INTERNAL MEDICINE

## 2022-11-18 RX ORDER — GADOBUTROL 604.72 MG/ML
7 INJECTION INTRAVENOUS
Status: COMPLETED | OUTPATIENT
Start: 2022-11-18 | End: 2022-11-18

## 2022-11-18 RX ADMIN — GADOBUTROL 7 ML: 604.72 INJECTION INTRAVENOUS at 01:11

## 2022-11-21 ENCOUNTER — PATIENT MESSAGE (OUTPATIENT)
Dept: PRIMARY CARE CLINIC | Facility: CLINIC | Age: 72
End: 2022-11-21
Payer: MEDICARE

## 2022-11-22 ENCOUNTER — OFFICE VISIT (OUTPATIENT)
Dept: PRIMARY CARE CLINIC | Facility: CLINIC | Age: 72
End: 2022-11-22
Payer: MEDICARE

## 2022-11-22 VITALS
SYSTOLIC BLOOD PRESSURE: 104 MMHG | OXYGEN SATURATION: 97 % | DIASTOLIC BLOOD PRESSURE: 70 MMHG | WEIGHT: 156.5 LBS | BODY MASS INDEX: 26.08 KG/M2 | HEIGHT: 65 IN | HEART RATE: 67 BPM | TEMPERATURE: 98 F

## 2022-11-22 DIAGNOSIS — G31.84 MCI (MILD COGNITIVE IMPAIRMENT) WITH MEMORY LOSS: ICD-10-CM

## 2022-11-22 DIAGNOSIS — R73.03 PRE-DIABETES: ICD-10-CM

## 2022-11-22 DIAGNOSIS — I10 ESSENTIAL HYPERTENSION: ICD-10-CM

## 2022-11-22 DIAGNOSIS — F43.21 ADJUSTMENT DISORDER WITH DEPRESSED MOOD: Primary | ICD-10-CM

## 2022-11-22 DIAGNOSIS — H61.22 IMPACTED CERUMEN OF LEFT EAR: ICD-10-CM

## 2022-11-22 PROCEDURE — 1159F MED LIST DOCD IN RCRD: CPT | Mod: CPTII,S$GLB,, | Performed by: INTERNAL MEDICINE

## 2022-11-22 PROCEDURE — 4010F ACE/ARB THERAPY RXD/TAKEN: CPT | Mod: CPTII,S$GLB,, | Performed by: INTERNAL MEDICINE

## 2022-11-22 PROCEDURE — 3078F PR MOST RECENT DIASTOLIC BLOOD PRESSURE < 80 MM HG: ICD-10-PCS | Mod: CPTII,S$GLB,, | Performed by: INTERNAL MEDICINE

## 2022-11-22 PROCEDURE — 3008F BODY MASS INDEX DOCD: CPT | Mod: CPTII,S$GLB,, | Performed by: INTERNAL MEDICINE

## 2022-11-22 PROCEDURE — 3074F SYST BP LT 130 MM HG: CPT | Mod: CPTII,S$GLB,, | Performed by: INTERNAL MEDICINE

## 2022-11-22 PROCEDURE — 1101F PT FALLS ASSESS-DOCD LE1/YR: CPT | Mod: CPTII,S$GLB,, | Performed by: INTERNAL MEDICINE

## 2022-11-22 PROCEDURE — 99999 PR PBB SHADOW E&M-EST. PATIENT-LVL V: ICD-10-PCS | Mod: PBBFAC,,, | Performed by: INTERNAL MEDICINE

## 2022-11-22 PROCEDURE — 3078F DIAST BP <80 MM HG: CPT | Mod: CPTII,S$GLB,, | Performed by: INTERNAL MEDICINE

## 2022-11-22 PROCEDURE — 99214 OFFICE O/P EST MOD 30 MIN: CPT | Mod: S$GLB,,, | Performed by: INTERNAL MEDICINE

## 2022-11-22 PROCEDURE — 1159F PR MEDICATION LIST DOCUMENTED IN MEDICAL RECORD: ICD-10-PCS | Mod: CPTII,S$GLB,, | Performed by: INTERNAL MEDICINE

## 2022-11-22 PROCEDURE — 99214 PR OFFICE/OUTPT VISIT, EST, LEVL IV, 30-39 MIN: ICD-10-PCS | Mod: S$GLB,,, | Performed by: INTERNAL MEDICINE

## 2022-11-22 PROCEDURE — 1160F RVW MEDS BY RX/DR IN RCRD: CPT | Mod: CPTII,S$GLB,, | Performed by: INTERNAL MEDICINE

## 2022-11-22 PROCEDURE — 3044F PR MOST RECENT HEMOGLOBIN A1C LEVEL <7.0%: ICD-10-PCS | Mod: CPTII,S$GLB,, | Performed by: INTERNAL MEDICINE

## 2022-11-22 PROCEDURE — 4010F PR ACE/ARB THEARPY RXD/TAKEN: ICD-10-PCS | Mod: CPTII,S$GLB,, | Performed by: INTERNAL MEDICINE

## 2022-11-22 PROCEDURE — 99999 PR PBB SHADOW E&M-EST. PATIENT-LVL V: CPT | Mod: PBBFAC,,, | Performed by: INTERNAL MEDICINE

## 2022-11-22 PROCEDURE — 3288F PR FALLS RISK ASSESSMENT DOCUMENTED: ICD-10-PCS | Mod: CPTII,S$GLB,, | Performed by: INTERNAL MEDICINE

## 2022-11-22 PROCEDURE — 3288F FALL RISK ASSESSMENT DOCD: CPT | Mod: CPTII,S$GLB,, | Performed by: INTERNAL MEDICINE

## 2022-11-22 PROCEDURE — 3044F HG A1C LEVEL LT 7.0%: CPT | Mod: CPTII,S$GLB,, | Performed by: INTERNAL MEDICINE

## 2022-11-22 PROCEDURE — 3008F PR BODY MASS INDEX (BMI) DOCUMENTED: ICD-10-PCS | Mod: CPTII,S$GLB,, | Performed by: INTERNAL MEDICINE

## 2022-11-22 PROCEDURE — 1125F PR PAIN SEVERITY QUANTIFIED, PAIN PRESENT: ICD-10-PCS | Mod: CPTII,S$GLB,, | Performed by: INTERNAL MEDICINE

## 2022-11-22 PROCEDURE — 1160F PR REVIEW ALL MEDS BY PRESCRIBER/CLIN PHARMACIST DOCUMENTED: ICD-10-PCS | Mod: CPTII,S$GLB,, | Performed by: INTERNAL MEDICINE

## 2022-11-22 PROCEDURE — 1101F PR PT FALLS ASSESS DOC 0-1 FALLS W/OUT INJ PAST YR: ICD-10-PCS | Mod: CPTII,S$GLB,, | Performed by: INTERNAL MEDICINE

## 2022-11-22 PROCEDURE — 1125F AMNT PAIN NOTED PAIN PRSNT: CPT | Mod: CPTII,S$GLB,, | Performed by: INTERNAL MEDICINE

## 2022-11-22 PROCEDURE — 3074F PR MOST RECENT SYSTOLIC BLOOD PRESSURE < 130 MM HG: ICD-10-PCS | Mod: CPTII,S$GLB,, | Performed by: INTERNAL MEDICINE

## 2022-11-22 RX ORDER — AMLODIPINE BESYLATE 10 MG/1
10 TABLET ORAL DAILY
Qty: 90 TABLET | Refills: 1 | Status: SHIPPED | OUTPATIENT
Start: 2022-11-22 | End: 2023-05-16

## 2022-11-22 RX ORDER — LOSARTAN POTASSIUM 100 MG/1
100 TABLET ORAL DAILY
Qty: 90 TABLET | Refills: 1 | Status: SHIPPED | OUTPATIENT
Start: 2022-11-22 | End: 2023-05-26 | Stop reason: SDUPTHER

## 2022-11-22 NOTE — PROGRESS NOTES
Subjective:       Patient ID: Nicole Jacques is a 72 y.o. female.    Chief Complaint: Follow-up    Last seen 3 weeks ago with hypertension controlled, for follow up memory impairment with anxiety and depression, c/o worsening memory impairment (short term), increased anxiety and dizziness described as dysequilibrium when ambulating, yet still able to walk 3 miles for exercise and play tennis, no falls. Labs done that day were all stable. Brain MRI done four days ago shows mild microvascular ischemic white matter changes, with no major territory infarct, hemorrhage, mass or increased pressure. We decided to change her Paxil which she'd been on for many years, choosing Lexapro for an easy transition at 20mg daily. Returns compliant with med change as prescribed, reporting good results. Did not have any symptoms of withdrawal with the switch, and feels her mood has stabilized. She requests to continue treatment the same. And declines statin therapy for microvascular disease due to risk of side effects may outweigh benefit. Hypertension is generally well controlled.    PMH: , misc x 1.    Hypertension.  Pre-Diabetes, HbA1c up to 6.3%.  GERD, EGD  only showed hiatal hernia.  Depression with Anxiety.    Lumbar Spondylosis.  Mild OA Knees.   Sigmoid Diverticulosis.     Colonoscopy 3/21 normal, repeat 10 yrs. Mammogram normal . Pelvic exam . BMD normal . Eye exam . EKG normal . Pneumovax . Prevnar 8/15. Tdap . Flu shot 10/21. Shingrix , . COVID (Pfizer) x 4 + Bivalent booster.    SOCIAL: No tobacco,  smokes. Social alcohol.  with 2 children. Retired  at [employer] and [employer].     PSH: BTL, Hysterectomy due to heavy menses, ovaries remain. Left knee arthroscopic surgery due to injury kick boxing. Appendectomy. Bilateral Cataract extractions and Blepharoplasty.     FMH: Father  age 81 with bladder cancer and dementia. Mother  age 78 with  "hypertension and renal failure, and CHF, possible osteoporosis. Diabetes in grandmothers. Anxiety - mother, sister. Colon cancer in maternal uncle.    Allergies: Bactrim.    Medications: list reviewed and reconciled.             Review of Systems   Constitutional:  Negative for fatigue, fever and unexpected weight change.   HENT:          Left ear feels stopped up with decreased hearing, no pain or discharge.    Respiratory:  Negative for cough and shortness of breath.    Cardiovascular:  Negative for chest pain, palpitations and leg swelling.   Gastrointestinal:  Negative for abdominal pain, nausea and vomiting.   Psychiatric/Behavioral:  Negative for agitation, confusion, hallucinations and suicidal ideas.        Objective:      Vitals:    11/22/22 1100   BP: 104/70   Pulse: 67   Temp: 98.1 °F (36.7 °C)   SpO2: 97%   Weight: 71 kg (156 lb 8 oz)   Height: 5' 5" (1.651 m)     Physical Exam  Constitutional:       General: She is not in acute distress.     Appearance: She is not ill-appearing.   HENT:      Right Ear: Tympanic membrane, ear canal and external ear normal.      Left Ear: There is impacted cerumen.   Cardiovascular:      Rate and Rhythm: Normal rate and regular rhythm.   Pulmonary:      Effort: Pulmonary effort is normal.      Breath sounds: Normal breath sounds.   Musculoskeletal:         General: Normal range of motion.      Right lower leg: No edema.      Left lower leg: No edema.   Skin:     General: Skin is warm and dry.   Neurological:      General: No focal deficit present.      Mental Status: She is alert and oriented to person, place, and time.      Cranial Nerves: No cranial nerve deficit.      Coordination: Coordination normal.      Gait: Gait normal.   Psychiatric:         Mood and Affect: Mood normal.         Behavior: Behavior normal.         Thought Content: Thought content normal.       No visits with results within 3 Week(s) from this visit.   Latest known visit with results is:   Lab " Visit on 10/27/2022   Component Date Value    WBC 10/27/2022 3.60 (L)     RBC 10/27/2022 3.78 (L)     Hemoglobin 10/27/2022 11.7 (L)     Hematocrit 10/27/2022 36.5 (L)     Chronic, stable.    MCV 10/27/2022 97     MCH 10/27/2022 31.0     MCHC 10/27/2022 32.1     RDW 10/27/2022 12.8     Platelets 10/27/2022 207     MPV 10/27/2022 12.0     Sodium 10/27/2022 138     Potassium 10/27/2022 4.0     Chloride 10/27/2022 103     CO2 10/27/2022 26     Glucose 10/27/2022 78     BUN 10/27/2022 20     Creatinine 10/27/2022 0.9     Calcium 10/27/2022 9.2     Total Protein 10/27/2022 8.1     Albumin 10/27/2022 3.7     Total Bilirubin 10/27/2022 0.3     Alkaline Phosphatase 10/27/2022 49 (L)     AST 10/27/2022 26     ALT 10/27/2022 13     Anion Gap 10/27/2022 9     eGFR 10/27/2022 >60.0     Cholesterol 10/27/2022 177     Triglycerides 10/27/2022 126     HDL 10/27/2022 64     LDL Cholesterol 10/27/2022 87.8     HDL/Cholesterol Ratio 10/27/2022 36.2     Total Cholesterol/HDL Ra* 10/27/2022 2.8     Non-HDL Cholesterol 10/27/2022 113     Hemoglobin A1C 10/27/2022 5.6     Estimated Avg Glucose 10/27/2022 114     TSH 10/27/2022 0.996      Assessment:       Problem List Items Addressed This Visit       Pre-diabetes     Other Visit Diagnoses       Adjustment disorder with depressed mood    -  Primary    MCI (mild cognitive impairment) with memory loss        Essential hypertension        Relevant Medications    losartan (COZAAR) 100 MG tablet    amLODIPine (NORVASC) 10 MG tablet    Impacted cerumen of left ear        Relevant Orders    Ambulatory referral/consult to ENT              Plan:       Adjustment disorder with depressed mood        -     improved on Lexapro, continue same.    MCI (mild cognitive impairment) with memory loss        -     I believe this is secondary to mood disorder, no additional medication prescribed for memory at this time.     Essential hypertension controlled  -     losartan (COZAAR) 100 MG tablet; Take 1 tablet  (100 mg total) by mouth once daily.  Dispense: 90 tablet; Refill: 1  -     amLODIPine (NORVASC) 10 MG tablet; Take 1 tablet (10 mg total) by mouth once daily.  Dispense: 90 tablet; Refill: 1    Pre-diabetes - improved.     Impacted cerumen of left ear  -     Ambulatory referral/consult to ENT; Future; Expected date: 11/29/2022     Flu shot needed.

## 2022-11-25 ENCOUNTER — TELEPHONE (OUTPATIENT)
Dept: PRIMARY CARE CLINIC | Facility: CLINIC | Age: 72
End: 2022-11-25
Payer: MEDICARE

## 2022-11-25 DIAGNOSIS — R90.82 WHITE MATTER DISEASE OF BRAIN DUE TO ISCHEMIA: Primary | ICD-10-CM

## 2022-11-25 DIAGNOSIS — I99.8 WHITE MATTER DISEASE OF BRAIN DUE TO ISCHEMIA: Primary | ICD-10-CM

## 2022-11-25 NOTE — TELEPHONE ENCOUNTER
----- Message from Yoli Wolf sent at 11/25/2022  3:29 PM CST -----  Contact: Patient [phone]  Good Afternoon  Patient is calling to talk to office and follow up on her decision    Please call and advise

## 2022-11-28 RX ORDER — PRAVASTATIN SODIUM 10 MG/1
10 TABLET ORAL DAILY
Qty: 90 TABLET | Refills: 1 | Status: SHIPPED | OUTPATIENT
Start: 2022-11-28 | End: 2023-03-24 | Stop reason: SDUPTHER

## 2022-11-30 ENCOUNTER — TELEPHONE (OUTPATIENT)
Dept: OBSTETRICS AND GYNECOLOGY | Facility: CLINIC | Age: 72
End: 2022-11-30
Payer: MEDICARE

## 2022-11-30 NOTE — TELEPHONE ENCOUNTER
Left detailed message for patient.  Dr. Mata does not have an available appointment today at 1pm. Advised patient to give the office a call to reschedule if needed.

## 2022-11-30 NOTE — TELEPHONE ENCOUNTER
----- Message from Shirley Banda MA sent at 11/30/2022 10:47 AM CST -----  Pt states she was suppose to have an appt today at 1pm [phone]

## 2022-11-30 NOTE — TELEPHONE ENCOUNTER
Called patient> patient states she got a phone call yesterday offering her a 1 om appointment on today and se thought she accepted the appointment however when she called to day to confirm she was told that she was not on the schedule. I apologized to her. Patient states she will just keep her 1st appointment.

## 2022-11-30 NOTE — TELEPHONE ENCOUNTER
----- Message from Olinda Kat MA sent at 11/30/2022 10:36 AM CST -----  Type:  Patient Returning Call    Who Called: pt  Does the patient know what this is regarding?: yes  Would the patient rather a call back or a response via [facility]? Call back  Best Call Back Number: [phone]  Additional Information: states she accepted the appt from the waiting list but it is not showing for today at 1 pm and states she is coming today for 1 pm, please contact pt

## 2022-12-06 ENCOUNTER — PATIENT MESSAGE (OUTPATIENT)
Dept: PRIMARY CARE CLINIC | Facility: CLINIC | Age: 72
End: 2022-12-06
Payer: MEDICARE

## 2022-12-07 ENCOUNTER — PATIENT MESSAGE (OUTPATIENT)
Dept: PRIMARY CARE CLINIC | Facility: CLINIC | Age: 72
End: 2022-12-07
Payer: MEDICARE

## 2022-12-12 ENCOUNTER — OFFICE VISIT (OUTPATIENT)
Dept: OTOLARYNGOLOGY | Facility: CLINIC | Age: 72
End: 2022-12-12
Payer: MEDICARE

## 2022-12-12 ENCOUNTER — TELEPHONE (OUTPATIENT)
Dept: PRIMARY CARE CLINIC | Facility: CLINIC | Age: 72
End: 2022-12-12
Payer: MEDICARE

## 2022-12-12 VITALS — DIASTOLIC BLOOD PRESSURE: 72 MMHG | HEART RATE: 72 BPM | SYSTOLIC BLOOD PRESSURE: 109 MMHG

## 2022-12-12 DIAGNOSIS — H61.22 HEARING LOSS DUE TO CERUMEN IMPACTION, LEFT: Primary | ICD-10-CM

## 2022-12-12 DIAGNOSIS — R26.89 IMBALANCE: ICD-10-CM

## 2022-12-12 PROCEDURE — 1126F AMNT PAIN NOTED NONE PRSNT: CPT | Mod: CPTII,S$GLB,, | Performed by: OTOLARYNGOLOGY

## 2022-12-12 PROCEDURE — 69210 PR REMOVAL IMPACTED CERUMEN REQUIRING INSTRUMENTATION, UNILATERAL: ICD-10-PCS | Mod: S$GLB,,, | Performed by: OTOLARYNGOLOGY

## 2022-12-12 PROCEDURE — 99999 PR PBB SHADOW E&M-EST. PATIENT-LVL III: CPT | Mod: PBBFAC,,, | Performed by: OTOLARYNGOLOGY

## 2022-12-12 PROCEDURE — 1126F PR PAIN SEVERITY QUANTIFIED, NO PAIN PRESENT: ICD-10-PCS | Mod: CPTII,S$GLB,, | Performed by: OTOLARYNGOLOGY

## 2022-12-12 PROCEDURE — 3078F DIAST BP <80 MM HG: CPT | Mod: CPTII,S$GLB,, | Performed by: OTOLARYNGOLOGY

## 2022-12-12 PROCEDURE — 3074F SYST BP LT 130 MM HG: CPT | Mod: CPTII,S$GLB,, | Performed by: OTOLARYNGOLOGY

## 2022-12-12 PROCEDURE — 1101F PT FALLS ASSESS-DOCD LE1/YR: CPT | Mod: CPTII,S$GLB,, | Performed by: OTOLARYNGOLOGY

## 2022-12-12 PROCEDURE — 3074F PR MOST RECENT SYSTOLIC BLOOD PRESSURE < 130 MM HG: ICD-10-PCS | Mod: CPTII,S$GLB,, | Performed by: OTOLARYNGOLOGY

## 2022-12-12 PROCEDURE — 3288F FALL RISK ASSESSMENT DOCD: CPT | Mod: CPTII,S$GLB,, | Performed by: OTOLARYNGOLOGY

## 2022-12-12 PROCEDURE — 69210 REMOVE IMPACTED EAR WAX UNI: CPT | Mod: S$GLB,,, | Performed by: OTOLARYNGOLOGY

## 2022-12-12 PROCEDURE — 99999 PR PBB SHADOW E&M-EST. PATIENT-LVL III: ICD-10-PCS | Mod: PBBFAC,,, | Performed by: OTOLARYNGOLOGY

## 2022-12-12 PROCEDURE — 3288F PR FALLS RISK ASSESSMENT DOCUMENTED: ICD-10-PCS | Mod: CPTII,S$GLB,, | Performed by: OTOLARYNGOLOGY

## 2022-12-12 PROCEDURE — 99499 NO LOS: ICD-10-PCS | Mod: S$GLB,,, | Performed by: OTOLARYNGOLOGY

## 2022-12-12 PROCEDURE — 1159F MED LIST DOCD IN RCRD: CPT | Mod: CPTII,S$GLB,, | Performed by: OTOLARYNGOLOGY

## 2022-12-12 PROCEDURE — 1159F PR MEDICATION LIST DOCUMENTED IN MEDICAL RECORD: ICD-10-PCS | Mod: CPTII,S$GLB,, | Performed by: OTOLARYNGOLOGY

## 2022-12-12 PROCEDURE — 4010F PR ACE/ARB THEARPY RXD/TAKEN: ICD-10-PCS | Mod: CPTII,S$GLB,, | Performed by: OTOLARYNGOLOGY

## 2022-12-12 PROCEDURE — 3044F PR MOST RECENT HEMOGLOBIN A1C LEVEL <7.0%: ICD-10-PCS | Mod: CPTII,S$GLB,, | Performed by: OTOLARYNGOLOGY

## 2022-12-12 PROCEDURE — 3078F PR MOST RECENT DIASTOLIC BLOOD PRESSURE < 80 MM HG: ICD-10-PCS | Mod: CPTII,S$GLB,, | Performed by: OTOLARYNGOLOGY

## 2022-12-12 PROCEDURE — 3044F HG A1C LEVEL LT 7.0%: CPT | Mod: CPTII,S$GLB,, | Performed by: OTOLARYNGOLOGY

## 2022-12-12 PROCEDURE — 99499 UNLISTED E&M SERVICE: CPT | Mod: S$GLB,,, | Performed by: OTOLARYNGOLOGY

## 2022-12-12 PROCEDURE — 1101F PR PT FALLS ASSESS DOC 0-1 FALLS W/OUT INJ PAST YR: ICD-10-PCS | Mod: CPTII,S$GLB,, | Performed by: OTOLARYNGOLOGY

## 2022-12-12 PROCEDURE — 4010F ACE/ARB THERAPY RXD/TAKEN: CPT | Mod: CPTII,S$GLB,, | Performed by: OTOLARYNGOLOGY

## 2022-12-12 NOTE — PATIENT INSTRUCTIONS
Large volume of occlusive cerumen removed from length of left ear canal; AS hearing improved  Consider audiometry on return prn  RTC prn ear cleaning

## 2022-12-12 NOTE — TELEPHONE ENCOUNTER
----- Message from Jennifer Pruitt sent at 12/12/2022  4:00 PM CST -----  Contact: Self/[phone]  Pt said that she is calling in regards to needing to get a return call from the nurse pt stated that it is personal . Please advise

## 2022-12-12 NOTE — PROGRESS NOTES
CC:  HPI: Ms. Jacques is a 72 year old AAF with a hx of tremor who was referred here  by Dr. Zara Sorensen for follow-up regarding memory impairment with anxiety and depression.  Physical exam indicated impacted cerumen in the left ear canal obscuring visualization of the left TM. Ms. Jacques was referred here for ENT specialty care.  Ms. Jacques indicated some disequilibrium sx  while ambulating.    She is an ex-  who quit playing during the COVID pandemic.  She indicates her 's ( suspected) hearing loss as he talks with an extremely loud voice.  She completed a brain MRI with and without contrast 11/18/2022 which indicated a chronic right caudate infarct only.  She indicates a family history of TIAs ( father with dementia and a sister with cancer).   She would like to get back to playing tennis.    Past Medical History:   Diagnosis Date    Anxiety     Cataract     Depression     Family history of colon cancer     GERD (gastroesophageal reflux disease)     Hypertension     Lumbar spondylosis     Lumbar spondylosis     Overactive bladder     Urge urinary incontinence        PE:  General: Alert and oriented AF in no acute distress.  Both ears were examined under the microscope.  A large volume of cerumen is removed from the length of the left ear canal with use of suction.  Left TM is clear and intact when finally visualized.  Right ear canal is devoid of cerumen.  The right TM is clear and intact the right middle ear space appears well aerated.  Audiometry was not performed today.    DIAGNOSIS:     ICD-10-CM ICD-9-CM    1. Hearing loss due to cerumen impaction, left  H61.22 389.8      380.4       2. Imbalance  R26.89 781.2         PLAN: Large volume of occlusive cerumen removed from length of left ear canal; AS hearing improved  Consider audiometry on return prn  RTC prn ear cleaning

## 2022-12-27 ENCOUNTER — TELEPHONE (OUTPATIENT)
Dept: FAMILY MEDICINE | Facility: CLINIC | Age: 72
End: 2022-12-27
Payer: MEDICARE

## 2022-12-28 ENCOUNTER — HOSPITAL ENCOUNTER (OUTPATIENT)
Dept: RADIOLOGY | Facility: HOSPITAL | Age: 72
Discharge: HOME | End: 2022-12-28
Attending: OBSTETRICS & GYNECOLOGY
Payer: MEDICARE

## 2022-12-28 DIAGNOSIS — Z12.31 SCREENING MAMMOGRAM, ENCOUNTER FOR: ICD-10-CM

## 2022-12-28 PROCEDURE — 77063 BREAST TOMOSYNTHESIS BI: CPT | Mod: TC,PO

## 2022-12-28 PROCEDURE — 77067 SCR MAMMO BI INCL CAD: CPT | Mod: TC,PO

## 2023-01-11 ENCOUNTER — TELEPHONE (OUTPATIENT)
Dept: PRIMARY CARE CLINIC | Facility: CLINIC | Age: 73
End: 2023-01-11
Payer: MEDICARE

## 2023-01-11 NOTE — TELEPHONE ENCOUNTER
----- Message from Tomasa Breen sent at 1/11/2023 10:09 AM CST -----  Contact: [phone]  Pt is calling for the nurse she states she is needing to speak to you in regards to prescriptions the  gave her please give return call

## 2023-01-11 NOTE — TELEPHONE ENCOUNTER
Pt called requesting refills on med Lexapro and Pravastatin I advised pt she have 1 more ( 90 tab )  refill remaining on both meds

## 2023-01-25 ENCOUNTER — OFFICE VISIT (OUTPATIENT)
Dept: OBSTETRICS AND GYNECOLOGY | Facility: CLINIC | Age: 73
End: 2023-01-25
Payer: MEDICARE

## 2023-01-25 VITALS
HEIGHT: 65 IN | BODY MASS INDEX: 26.45 KG/M2 | WEIGHT: 158.75 LBS | DIASTOLIC BLOOD PRESSURE: 58 MMHG | SYSTOLIC BLOOD PRESSURE: 104 MMHG

## 2023-01-25 DIAGNOSIS — N95.2 POSTMENOPAUSAL ATROPHIC VAGINITIS: ICD-10-CM

## 2023-01-25 DIAGNOSIS — N39.41 URGE INCONTINENCE: ICD-10-CM

## 2023-01-25 DIAGNOSIS — N95.1 MENOPAUSAL SYMPTOMS: ICD-10-CM

## 2023-01-25 DIAGNOSIS — Z01.419 ENCOUNTER FOR GYNECOLOGICAL EXAMINATION WITHOUT ABNORMAL FINDING: Primary | ICD-10-CM

## 2023-01-25 PROCEDURE — G0101 CA SCREEN;PELVIC/BREAST EXAM: HCPCS | Mod: GZ,S$GLB,, | Performed by: OBSTETRICS & GYNECOLOGY

## 2023-01-25 PROCEDURE — 99999 PR PBB SHADOW E&M-EST. PATIENT-LVL IV: ICD-10-PCS | Mod: PBBFAC,,, | Performed by: OBSTETRICS & GYNECOLOGY

## 2023-01-25 PROCEDURE — G0101 PR CA SCREEN;PELVIC/BREAST EXAM: ICD-10-PCS | Mod: GZ,S$GLB,, | Performed by: OBSTETRICS & GYNECOLOGY

## 2023-01-25 PROCEDURE — 3074F SYST BP LT 130 MM HG: CPT | Mod: CPTII,S$GLB,, | Performed by: OBSTETRICS & GYNECOLOGY

## 2023-01-25 PROCEDURE — 1160F PR REVIEW ALL MEDS BY PRESCRIBER/CLIN PHARMACIST DOCUMENTED: ICD-10-PCS | Mod: CPTII,S$GLB,, | Performed by: OBSTETRICS & GYNECOLOGY

## 2023-01-25 PROCEDURE — 1160F RVW MEDS BY RX/DR IN RCRD: CPT | Mod: CPTII,S$GLB,, | Performed by: OBSTETRICS & GYNECOLOGY

## 2023-01-25 PROCEDURE — 3078F DIAST BP <80 MM HG: CPT | Mod: CPTII,S$GLB,, | Performed by: OBSTETRICS & GYNECOLOGY

## 2023-01-25 PROCEDURE — 1159F MED LIST DOCD IN RCRD: CPT | Mod: CPTII,S$GLB,, | Performed by: OBSTETRICS & GYNECOLOGY

## 2023-01-25 PROCEDURE — 3078F PR MOST RECENT DIASTOLIC BLOOD PRESSURE < 80 MM HG: ICD-10-PCS | Mod: CPTII,S$GLB,, | Performed by: OBSTETRICS & GYNECOLOGY

## 2023-01-25 PROCEDURE — 1101F PT FALLS ASSESS-DOCD LE1/YR: CPT | Mod: CPTII,S$GLB,, | Performed by: OBSTETRICS & GYNECOLOGY

## 2023-01-25 PROCEDURE — 3074F PR MOST RECENT SYSTOLIC BLOOD PRESSURE < 130 MM HG: ICD-10-PCS | Mod: CPTII,S$GLB,, | Performed by: OBSTETRICS & GYNECOLOGY

## 2023-01-25 PROCEDURE — 3288F FALL RISK ASSESSMENT DOCD: CPT | Mod: CPTII,S$GLB,, | Performed by: OBSTETRICS & GYNECOLOGY

## 2023-01-25 PROCEDURE — 1126F PR PAIN SEVERITY QUANTIFIED, NO PAIN PRESENT: ICD-10-PCS | Mod: CPTII,S$GLB,, | Performed by: OBSTETRICS & GYNECOLOGY

## 2023-01-25 PROCEDURE — 3288F PR FALLS RISK ASSESSMENT DOCUMENTED: ICD-10-PCS | Mod: CPTII,S$GLB,, | Performed by: OBSTETRICS & GYNECOLOGY

## 2023-01-25 PROCEDURE — 1159F PR MEDICATION LIST DOCUMENTED IN MEDICAL RECORD: ICD-10-PCS | Mod: CPTII,S$GLB,, | Performed by: OBSTETRICS & GYNECOLOGY

## 2023-01-25 PROCEDURE — 3008F BODY MASS INDEX DOCD: CPT | Mod: CPTII,S$GLB,, | Performed by: OBSTETRICS & GYNECOLOGY

## 2023-01-25 PROCEDURE — 3008F PR BODY MASS INDEX (BMI) DOCUMENTED: ICD-10-PCS | Mod: CPTII,S$GLB,, | Performed by: OBSTETRICS & GYNECOLOGY

## 2023-01-25 PROCEDURE — 1101F PR PT FALLS ASSESS DOC 0-1 FALLS W/OUT INJ PAST YR: ICD-10-PCS | Mod: CPTII,S$GLB,, | Performed by: OBSTETRICS & GYNECOLOGY

## 2023-01-25 PROCEDURE — 99999 PR PBB SHADOW E&M-EST. PATIENT-LVL IV: CPT | Mod: PBBFAC,,, | Performed by: OBSTETRICS & GYNECOLOGY

## 2023-01-25 PROCEDURE — 1126F AMNT PAIN NOTED NONE PRSNT: CPT | Mod: CPTII,S$GLB,, | Performed by: OBSTETRICS & GYNECOLOGY

## 2023-01-25 RX ORDER — ESTRADIOL 0.1 MG/G
1 CREAM VAGINAL
Qty: 42 G | Refills: 3 | Status: SHIPPED | OUTPATIENT
Start: 2023-01-26 | End: 2024-01-29 | Stop reason: SDUPTHER

## 2023-01-25 RX ORDER — ESTRADIOL 1 MG/1
1 TABLET ORAL DAILY
Qty: 90 TABLET | Refills: 3 | Status: SHIPPED | OUTPATIENT
Start: 2023-01-25 | End: 2023-02-01 | Stop reason: SDUPTHER

## 2023-01-25 NOTE — Clinical Note
Would appreciate her being seen for urge incontinence, sometimes takes oxybutinin prescribed by PCP, but makes her mouth too dry, so rarely takes it.

## 2023-01-25 NOTE — PROGRESS NOTES
Subjective:       Patient ID: Nicole Jacques is a 73 y.o. female.    Chief Complaint:  Well Woman and Gynecologic Exam      History of Present Illness  HPI  Nicole Jacques is a 73 y.o. female  here for her annual GYN exam.   She reports urge incontinence, had some improvement with Oxybutinin as prescribed by PCP, but doesn't take it regularly due to the severe dryness she gets from it.   denies vaginal itching or irritation.  Denies vaginal discharge.  She is sexually active. She has had1 partner for 53 years .   History of abnormal pap: No  Last Pap: was normal and had hysterectomy  Last MMG: normal-2022:BI-RADS Category: Overall: 2 - Benign-routine follow-up in 12 months    Last Colonoscopy:  colonoscopy 2 years ago without abnormalities.  denies domestic violence. She does feel safe at home.     Past Medical History:   Diagnosis Date    Anxiety     Cataract     Depression     Family history of colon cancer     GERD (gastroesophageal reflux disease)     Hypertension     Lumbar spondylosis     Lumbar spondylosis     Overactive bladder     Urge urinary incontinence      Past Surgical History:   Procedure Laterality Date    APPENDECTOMY      CATARACT EXTRACTION W/  INTRAOCULAR LENS IMPLANT Left 2014    Dr Drake    CATARACT EXTRACTION W/  INTRAOCULAR LENS IMPLANT Right 2014    COLONOSCOPY N/A 2015    Procedure: COLONOSCOPY;  Surgeon: Desmond Casillas MD;  Location: [address]);  Service: Endoscopy;  Laterality: N/A;    COLONOSCOPY N/A 2021    Procedure: COLONOSCOPY;  Surgeon: Desmond Casillas MD;  Location: [address]);  Service: Endoscopy;  Laterality: N/A;  covid test 3/13 primary care, instructions emailed-\[facility] [place] [facility]\""    EYE SURGERY      HYSTERECTOMY      partial    KNEE SURGERY Left     TUBAL LIGATION       Social History     Socioeconomic History    Marital status:    Tobacco Use    Smoking status: Passive Smoke Exposure - Never Smoker    Smokeless  "tobacco: Never   Substance and Sexual Activity    Alcohol use: Yes     Comment: Social.    Drug use: No    Sexual activity: Yes     Partners: Male     Comment: , together x 51 years   Other Topics Concern    Are you pregnant or think you may be? No    Breast-feeding No     Family History   Problem Relation Age of Onset    Diabetes Paternal Grandmother     Diabetes Maternal Grandmother     Dementia Father     Cancer Father         bladder    Basal cell carcinoma Father     Hypertension Mother     Kidney disease Mother     Heart disease Mother     Diabetes Brother     Hypertension Brother     Hypertension Sister     Diabetes Sister     Hypertension Sister     Breast cancer Sister 54        recurrence at age 64    Diabetes Sister     No Known Problems Daughter     No Known Problems Son     Cancer Maternal Uncle         colon cancer    Amblyopia Neg Hx     Blindness Neg Hx     Cataracts Neg Hx     Glaucoma Neg Hx     Macular degeneration Neg Hx     Retinal detachment Neg Hx     Strabismus Neg Hx     Melanoma Neg Hx      OB History          2    Para   2    Term   2            AB        Living   2         SAB        IAB        Ectopic        Multiple        Live Births   2                 BP (!) 104/58   Ht 5' 5" (1.651 m)   Wt 72 kg (158 lb 11.7 oz)   BMI 26.41 kg/m²         GYN & OB History    Date of Last Pap: No result found    OB History    Para Term  AB Living   2 2 2     2   SAB IAB Ectopic Multiple Live Births           2      # Outcome Date GA Lbr Lui/2nd Weight Sex Delivery Anes PTL Lv   2 Term      Vag-Spont   LIZZY   1 Term      Vag-Spont   LIZZY       Review of Systems  Review of Systems   Constitutional:  Negative for activity change, appetite change, fatigue and unexpected weight change.   HENT: Negative.     Eyes:  Negative for visual disturbance.   Respiratory:  Negative for shortness of breath and wheezing.    Cardiovascular:  Negative for chest pain, palpitations and " leg swelling.   Gastrointestinal:  Negative for abdominal pain, bloating and blood in stool.   Endocrine: Negative for diabetes and hair loss.   Genitourinary:  Positive for bladder incontinence and vaginal dryness. Negative for decreased libido, dyspareunia and hot flashes.   Musculoskeletal:  Negative for back pain and joint swelling.   Integumentary:  Negative for acne, hair changes and nipple discharge.   Neurological:  Negative for headaches.   Hematological:  Does not bruise/bleed easily.   Psychiatric/Behavioral:  Negative for depression and sleep disturbance. The patient is not nervous/anxious.    Breast: Negative for mastodynia and nipple discharge        Objective:      Physical Exam:   Constitutional: She is oriented to person, place, and time. She appears well-developed and well-nourished.    HENT:   Head: Normocephalic and atraumatic.    Eyes: Pupils are equal, round, and reactive to light. EOM are normal.     Cardiovascular:  Normal rate and regular rhythm.             Pulmonary/Chest: Effort normal and breath sounds normal.   BREASTS:  no mass, no tenderness, no deformity and no retraction. Right breast exhibits no inverted nipple, no mass, no nipple discharge, no skin change, no tenderness, no bleeding and no swelling. Left breast exhibits no inverted nipple, no mass, no nipple discharge, no skin change, no tenderness, no bleeding and no swelling. Breasts are symmetrical.              Abdominal: Soft. Bowel sounds are normal.     Genitourinary:    Pelvic exam was performed with patient supine.      Genitourinary Comments: PELVIC: Normal external female genitalia without lesions. Normal hair distribution. Adequate perineal body, normal urethral meatus. Vagina atrophic without lesions or discharge. No significant cystocele or rectocele. Bimanual exam shows uterus and cervix to be surgically absent. Adnexa without masses or tenderness.  RECTAL: Deferred                 Musculoskeletal: Normal range of  motion and moves all extremeties.       Neurological: She is alert and oriented to person, place, and time.    Skin: Skin is warm and dry.    Psychiatric: She has a normal mood and affect.            Assessment:        1. Encounter for gynecological examination without abnormal finding    2. Menopausal symptoms    3. Postmenopausal atrophic vaginitis    4. Urge incontinence                Plan:      Problem List Items Addressed This Visit    None  Visit Diagnoses       Encounter for gynecological examination without abnormal finding    -  Primary    Menopausal symptoms        Relevant Medications    estradioL (ESTRACE) 1 MG tablet (take 1/2 tablet nightly)    Postmenopausal atrophic vaginitis        Relevant Medications    estradioL (ESTRACE) 0.01 % (0.1 mg/gram) vaginal cream (Start on 1/26/2023)    Urge incontinence        Relevant Orders    Ambulatory referral/consult to Urogynecology             Follow up in about 1 year (around 1/25/2024).

## 2023-02-01 DIAGNOSIS — N95.1 MENOPAUSAL SYMPTOMS: ICD-10-CM

## 2023-02-01 RX ORDER — ESTRADIOL 1 MG/1
1 TABLET ORAL DAILY
Qty: 90 TABLET | Refills: 3 | Status: SHIPPED | OUTPATIENT
Start: 2023-02-01 | End: 2024-01-29 | Stop reason: DRUGHIGH

## 2023-02-01 NOTE — TELEPHONE ENCOUNTER
----- Message from Herbiecorwin Chengry sent at 2/1/2023  4:47 PM CST -----  Type:  Patient Returning Call    Who Called:pt  Who Left Message for Patient:  Does the patient know what this is regarding?:rx  Would the patient rather a call back or a response via [facility]? Call  Best Call Back Number:[phone]  Additional Information: pt states that she does not need anymore    estradioL  cream and her estradioL tablets were sent to [facility] and they were supposed to be sent to [facility] phone [phone], pt states that she gets all her prescriptions through that company [facility]

## 2023-02-03 ENCOUNTER — TELEPHONE (OUTPATIENT)
Dept: PRIMARY CARE CLINIC | Facility: CLINIC | Age: 73
End: 2023-02-03
Payer: MEDICARE

## 2023-02-03 NOTE — TELEPHONE ENCOUNTER
----- Message from Jessica Box sent at 2/3/2023  9:52 AM CST -----  Contact: Pt @[phone]  Returning a phone call.  Who left a message for the patient:  Ronna Grove    Do they know what this is regarding:  No     Would they like a phone call back or a response via [facility]:   Call back

## 2023-02-14 ENCOUNTER — TELEPHONE (OUTPATIENT)
Dept: UROGYNECOLOGY | Facility: CLINIC | Age: 73
End: 2023-02-14
Payer: MEDICARE

## 2023-02-14 NOTE — TELEPHONE ENCOUNTER
----- Message from Waleska Chiu sent at 2/14/2023  3:37 PM CST -----    Patient Returning Call        Who Called:pt  Does the patient know what this is regarding?:talk with nurse or doctor  about up coming appt  concern with test that doctor may need done  Would the patient rather a call back or a response via [facility]? call  Best Call Back Number:[phone]  Additional Information: call back

## 2023-03-05 DIAGNOSIS — F43.21 ADJUSTMENT DISORDER WITH DEPRESSED MOOD: ICD-10-CM

## 2023-03-05 DIAGNOSIS — F41.1 GAD (GENERALIZED ANXIETY DISORDER): ICD-10-CM

## 2023-03-05 RX ORDER — ESCITALOPRAM OXALATE 20 MG/1
TABLET ORAL
Qty: 90 TABLET | Refills: 2 | Status: SHIPPED | OUTPATIENT
Start: 2023-03-05 | End: 2023-11-18

## 2023-03-05 NOTE — TELEPHONE ENCOUNTER
No new care gaps identified.  [facility] Embedded Care Gaps. Reference number: [id number]. 3/05/2023   5:39:54 AM CST

## 2023-03-06 NOTE — TELEPHONE ENCOUNTER
Refill Decision Note   Nicole Jacques  is requesting a refill authorization.  Brief Assessment and Rationale for Refill:  Approve     Medication Therapy Plan:       Medication Reconciliation Completed: No   Comments:     No Care Gaps recommended.     Note composed:10:02 PM 03/05/2023

## 2023-03-24 DIAGNOSIS — I99.8 WHITE MATTER DISEASE OF BRAIN DUE TO ISCHEMIA: ICD-10-CM

## 2023-03-24 DIAGNOSIS — R90.82 WHITE MATTER DISEASE OF BRAIN DUE TO ISCHEMIA: ICD-10-CM

## 2023-03-24 RX ORDER — PRAVASTATIN SODIUM 10 MG/1
10 TABLET ORAL DAILY
Qty: 90 TABLET | Refills: 0 | Status: SHIPPED | OUTPATIENT
Start: 2023-03-24 | End: 2023-05-26 | Stop reason: SDUPTHER

## 2023-03-24 NOTE — TELEPHONE ENCOUNTER
----- Message from Jennifer Banda sent at 3/24/2023  3:34 PM CDT -----  Contact: [phone]  Requesting an RX refill or new RX.  Is this a refill or new RX: refill  RX name and strength   pravastatin (PRAVACHOL) 10 MG tablet 90 tablet   Is this a 30 day or 90 day RX: 90  Pharmacy name and phone #   [facility] - [place] [address] [address] [facility]   Phone:  [phone]  Fax:  [phone]  The doctors have asked that we provide their patients with the following 2 reminders -- prescription refills can take up to 72 hours, and a friendly reminder that in the future you can use your [facility] account to request refills: yes

## 2023-03-24 NOTE — TELEPHONE ENCOUNTER
No new care gaps identified.  [facility] Embedded Care Gaps. Reference number: [id number]. 3/24/2023   3:57:19 PM CDT

## 2023-03-29 ENCOUNTER — TELEPHONE (OUTPATIENT)
Dept: FAMILY MEDICINE | Facility: CLINIC | Age: 73
End: 2023-03-29
Payer: MEDICARE

## 2023-04-04 ENCOUNTER — TELEPHONE (OUTPATIENT)
Dept: OBSTETRICS AND GYNECOLOGY | Facility: CLINIC | Age: 73
End: 2023-04-04
Payer: MEDICARE

## 2023-04-04 NOTE — TELEPHONE ENCOUNTER
----- Message from Herbie Atkinson sent at 4/4/2023  2:01 PM CDT -----  Type:  Sooner Apoointment Request    Caller is requesting a sooner appointment.  Caller declined first available appointment listed below.  Caller will not accept being placed on the waitlist and is requesting a message be sent to doctor.  Name of Caller:pt  When is the first available appointment?06/07/2023  Symptoms:Urinary incontinence   Would the patient rather a call back or a response via [facility]? Call  Best Call Back Number:[phone]  Additional Information: pt would like a sooner appt

## 2023-05-16 DIAGNOSIS — I10 ESSENTIAL HYPERTENSION: ICD-10-CM

## 2023-05-16 RX ORDER — AMLODIPINE BESYLATE 10 MG/1
TABLET ORAL
Qty: 90 TABLET | Refills: 1 | Status: SHIPPED | OUTPATIENT
Start: 2023-05-16 | End: 2023-10-29

## 2023-05-16 NOTE — TELEPHONE ENCOUNTER
Refill Decision Note   Nicole Jacques  is requesting a refill authorization.  Brief Assessment and Rationale for Refill:  Approve     Medication Therapy Plan:         Comments:     Note composed:8:09 AM 05/16/2023             Appointments     Last Visit   11/22/2022 Zara Sorensen MD   Next Visit   5/26/2023 Zara Sorensen MD

## 2023-05-16 NOTE — TELEPHONE ENCOUNTER
No care due was identified.  [facility] Embedded Care Due Messages. Reference number: [id number].   5/16/2023 5:08:07 AM CDT

## 2023-05-26 ENCOUNTER — LAB VISIT (OUTPATIENT)
Dept: LAB | Facility: HOSPITAL | Age: 73
End: 2023-05-26
Attending: INTERNAL MEDICINE
Payer: MEDICARE

## 2023-05-26 ENCOUNTER — OFFICE VISIT (OUTPATIENT)
Dept: PRIMARY CARE CLINIC | Facility: CLINIC | Age: 73
End: 2023-05-26
Payer: MEDICARE

## 2023-05-26 VITALS
BODY MASS INDEX: 24.61 KG/M2 | HEIGHT: 65 IN | SYSTOLIC BLOOD PRESSURE: 108 MMHG | OXYGEN SATURATION: 96 % | HEART RATE: 68 BPM | TEMPERATURE: 99 F | DIASTOLIC BLOOD PRESSURE: 64 MMHG | WEIGHT: 147.69 LBS

## 2023-05-26 DIAGNOSIS — R10.31 BILATERAL LOWER ABDOMINAL PAIN: ICD-10-CM

## 2023-05-26 DIAGNOSIS — F33.0 MAJOR DEPRESSIVE DISORDER, RECURRENT, MILD: ICD-10-CM

## 2023-05-26 DIAGNOSIS — R90.82 WHITE MATTER DISEASE OF BRAIN DUE TO ISCHEMIA: ICD-10-CM

## 2023-05-26 DIAGNOSIS — R73.03 PRE-DIABETES: ICD-10-CM

## 2023-05-26 DIAGNOSIS — R10.32 BILATERAL LOWER ABDOMINAL PAIN: ICD-10-CM

## 2023-05-26 DIAGNOSIS — F41.1 GAD (GENERALIZED ANXIETY DISORDER): ICD-10-CM

## 2023-05-26 DIAGNOSIS — G25.0 BENIGN ESSENTIAL TREMOR: ICD-10-CM

## 2023-05-26 DIAGNOSIS — M15.9 PRIMARY OSTEOARTHRITIS INVOLVING MULTIPLE JOINTS: ICD-10-CM

## 2023-05-26 DIAGNOSIS — I10 ESSENTIAL HYPERTENSION: Primary | ICD-10-CM

## 2023-05-26 DIAGNOSIS — F13.20 SEDATIVE, HYPNOTIC OR ANXIOLYTIC DEPENDENCE, UNCOMPLICATED: ICD-10-CM

## 2023-05-26 DIAGNOSIS — I99.8 WHITE MATTER DISEASE OF BRAIN DUE TO ISCHEMIA: ICD-10-CM

## 2023-05-26 LAB
ALBUMIN SERPL BCP-MCNC: 3.9 G/DL (ref 3.5–5.2)
ALP SERPL-CCNC: 51 U/L (ref 55–135)
ALT SERPL W/O P-5'-P-CCNC: 22 U/L (ref 10–44)
ANION GAP SERPL CALC-SCNC: 7 MMOL/L (ref 8–16)
AST SERPL-CCNC: 30 U/L (ref 10–40)
BACTERIA #/AREA URNS AUTO: ABNORMAL /HPF
BASOPHILS # BLD AUTO: 0.02 K/UL (ref 0–0.2)
BASOPHILS NFR BLD: 0.4 % (ref 0–1.9)
BILIRUB SERPL-MCNC: 0.3 MG/DL (ref 0.1–1)
BILIRUB UR QL STRIP: NEGATIVE
BUN SERPL-MCNC: 29 MG/DL (ref 8–23)
CALCIUM SERPL-MCNC: 10.1 MG/DL (ref 8.7–10.5)
CHLORIDE SERPL-SCNC: 103 MMOL/L (ref 95–110)
CLARITY UR REFRACT.AUTO: ABNORMAL
CO2 SERPL-SCNC: 30 MMOL/L (ref 23–29)
COLOR UR AUTO: ABNORMAL
CREAT SERPL-MCNC: 1.1 MG/DL (ref 0.5–1.4)
DIFFERENTIAL METHOD: ABNORMAL
EOSINOPHIL # BLD AUTO: 0.1 K/UL (ref 0–0.5)
EOSINOPHIL NFR BLD: 1.5 % (ref 0–8)
ERYTHROCYTE [DISTWIDTH] IN BLOOD BY AUTOMATED COUNT: 12.7 % (ref 11.5–14.5)
EST. GFR  (NO RACE VARIABLE): 52.7 ML/MIN/1.73 M^2
GLUCOSE SERPL-MCNC: 53 MG/DL (ref 70–110)
GLUCOSE UR QL STRIP: NEGATIVE
HCT VFR BLD AUTO: 38.2 % (ref 37–48.5)
HGB BLD-MCNC: 12.2 G/DL (ref 12–16)
HGB UR QL STRIP: NEGATIVE
IMM GRANULOCYTES # BLD AUTO: 0.01 K/UL (ref 0–0.04)
IMM GRANULOCYTES NFR BLD AUTO: 0.2 % (ref 0–0.5)
KETONES UR QL STRIP: ABNORMAL
LEUKOCYTE ESTERASE UR QL STRIP: ABNORMAL
LYMPHOCYTES # BLD AUTO: 1 K/UL (ref 1–4.8)
LYMPHOCYTES NFR BLD: 20.6 % (ref 18–48)
MCH RBC QN AUTO: 30.2 PG (ref 27–31)
MCHC RBC AUTO-ENTMCNC: 31.9 G/DL (ref 32–36)
MCV RBC AUTO: 95 FL (ref 82–98)
MICROSCOPIC COMMENT: ABNORMAL
MONOCYTES # BLD AUTO: 0.4 K/UL (ref 0.3–1)
MONOCYTES NFR BLD: 7.8 % (ref 4–15)
NEUTROPHILS # BLD AUTO: 3.2 K/UL (ref 1.8–7.7)
NEUTROPHILS NFR BLD: 69.5 % (ref 38–73)
NITRITE UR QL STRIP: NEGATIVE
NRBC BLD-RTO: 0 /100 WBC
PH UR STRIP: 5 [PH] (ref 5–8)
PLATELET # BLD AUTO: 240 K/UL (ref 150–450)
PMV BLD AUTO: 11.4 FL (ref 9.2–12.9)
POTASSIUM SERPL-SCNC: 4.6 MMOL/L (ref 3.5–5.1)
PROT SERPL-MCNC: 8.6 G/DL (ref 6–8.4)
PROT UR QL STRIP: NEGATIVE
RBC # BLD AUTO: 4.04 M/UL (ref 4–5.4)
RBC #/AREA URNS AUTO: 1 /HPF (ref 0–4)
SODIUM SERPL-SCNC: 140 MMOL/L (ref 136–145)
SP GR UR STRIP: 1.02 (ref 1–1.03)
SQUAMOUS #/AREA URNS AUTO: 1 /HPF
URN SPEC COLLECT METH UR: ABNORMAL
WBC # BLD AUTO: 4.62 K/UL (ref 3.9–12.7)
WBC #/AREA URNS AUTO: 11 /HPF (ref 0–5)

## 2023-05-26 PROCEDURE — 80053 COMPREHEN METABOLIC PANEL: CPT | Performed by: INTERNAL MEDICINE

## 2023-05-26 PROCEDURE — 3074F SYST BP LT 130 MM HG: CPT | Mod: CPTII,S$GLB,, | Performed by: INTERNAL MEDICINE

## 2023-05-26 PROCEDURE — 1160F RVW MEDS BY RX/DR IN RCRD: CPT | Mod: CPTII,S$GLB,, | Performed by: INTERNAL MEDICINE

## 2023-05-26 PROCEDURE — 3288F PR FALLS RISK ASSESSMENT DOCUMENTED: ICD-10-PCS | Mod: CPTII,S$GLB,, | Performed by: INTERNAL MEDICINE

## 2023-05-26 PROCEDURE — 1160F PR REVIEW ALL MEDS BY PRESCRIBER/CLIN PHARMACIST DOCUMENTED: ICD-10-PCS | Mod: CPTII,S$GLB,, | Performed by: INTERNAL MEDICINE

## 2023-05-26 PROCEDURE — 1159F PR MEDICATION LIST DOCUMENTED IN MEDICAL RECORD: ICD-10-PCS | Mod: CPTII,S$GLB,, | Performed by: INTERNAL MEDICINE

## 2023-05-26 PROCEDURE — 4010F PR ACE/ARB THEARPY RXD/TAKEN: ICD-10-PCS | Mod: CPTII,S$GLB,, | Performed by: INTERNAL MEDICINE

## 2023-05-26 PROCEDURE — 99999 PR PBB SHADOW E&M-EST. PATIENT-LVL IV: CPT | Mod: PBBFAC,,, | Performed by: INTERNAL MEDICINE

## 2023-05-26 PROCEDURE — 85025 COMPLETE CBC W/AUTO DIFF WBC: CPT | Performed by: INTERNAL MEDICINE

## 2023-05-26 PROCEDURE — 81001 URINALYSIS AUTO W/SCOPE: CPT | Performed by: INTERNAL MEDICINE

## 2023-05-26 PROCEDURE — 3008F PR BODY MASS INDEX (BMI) DOCUMENTED: ICD-10-PCS | Mod: CPTII,S$GLB,, | Performed by: INTERNAL MEDICINE

## 2023-05-26 PROCEDURE — 1126F AMNT PAIN NOTED NONE PRSNT: CPT | Mod: CPTII,S$GLB,, | Performed by: INTERNAL MEDICINE

## 2023-05-26 PROCEDURE — 99214 OFFICE O/P EST MOD 30 MIN: CPT | Mod: S$GLB,,, | Performed by: INTERNAL MEDICINE

## 2023-05-26 PROCEDURE — 99499 RISK ADDL DX/OHS AUDIT: ICD-10-PCS | Mod: S$GLB,,, | Performed by: INTERNAL MEDICINE

## 2023-05-26 PROCEDURE — 3074F PR MOST RECENT SYSTOLIC BLOOD PRESSURE < 130 MM HG: ICD-10-PCS | Mod: CPTII,S$GLB,, | Performed by: INTERNAL MEDICINE

## 2023-05-26 PROCEDURE — 99214 PR OFFICE/OUTPT VISIT, EST, LEVL IV, 30-39 MIN: ICD-10-PCS | Mod: S$GLB,,, | Performed by: INTERNAL MEDICINE

## 2023-05-26 PROCEDURE — 3288F FALL RISK ASSESSMENT DOCD: CPT | Mod: CPTII,S$GLB,, | Performed by: INTERNAL MEDICINE

## 2023-05-26 PROCEDURE — 1126F PR PAIN SEVERITY QUANTIFIED, NO PAIN PRESENT: ICD-10-PCS | Mod: CPTII,S$GLB,, | Performed by: INTERNAL MEDICINE

## 2023-05-26 PROCEDURE — 1101F PT FALLS ASSESS-DOCD LE1/YR: CPT | Mod: CPTII,S$GLB,, | Performed by: INTERNAL MEDICINE

## 2023-05-26 PROCEDURE — 3078F PR MOST RECENT DIASTOLIC BLOOD PRESSURE < 80 MM HG: ICD-10-PCS | Mod: CPTII,S$GLB,, | Performed by: INTERNAL MEDICINE

## 2023-05-26 PROCEDURE — 1159F MED LIST DOCD IN RCRD: CPT | Mod: CPTII,S$GLB,, | Performed by: INTERNAL MEDICINE

## 2023-05-26 PROCEDURE — 99499 UNLISTED E&M SERVICE: CPT | Mod: S$GLB,,, | Performed by: INTERNAL MEDICINE

## 2023-05-26 PROCEDURE — 3078F DIAST BP <80 MM HG: CPT | Mod: CPTII,S$GLB,, | Performed by: INTERNAL MEDICINE

## 2023-05-26 PROCEDURE — 99999 PR PBB SHADOW E&M-EST. PATIENT-LVL IV: ICD-10-PCS | Mod: PBBFAC,,, | Performed by: INTERNAL MEDICINE

## 2023-05-26 PROCEDURE — 1101F PR PT FALLS ASSESS DOC 0-1 FALLS W/OUT INJ PAST YR: ICD-10-PCS | Mod: CPTII,S$GLB,, | Performed by: INTERNAL MEDICINE

## 2023-05-26 PROCEDURE — 4010F ACE/ARB THERAPY RXD/TAKEN: CPT | Mod: CPTII,S$GLB,, | Performed by: INTERNAL MEDICINE

## 2023-05-26 PROCEDURE — 3008F BODY MASS INDEX DOCD: CPT | Mod: CPTII,S$GLB,, | Performed by: INTERNAL MEDICINE

## 2023-05-26 PROCEDURE — 36415 COLL VENOUS BLD VENIPUNCTURE: CPT | Mod: PN | Performed by: INTERNAL MEDICINE

## 2023-05-26 RX ORDER — ALPRAZOLAM 2 MG/1
2 TABLET ORAL 2 TIMES DAILY
Qty: 60 TABLET | Refills: 5 | Status: SHIPPED | OUTPATIENT
Start: 2023-05-26 | End: 2023-12-01 | Stop reason: SDUPTHER

## 2023-05-26 RX ORDER — PRAVASTATIN SODIUM 10 MG/1
10 TABLET ORAL DAILY
Qty: 90 TABLET | Refills: 1 | Status: SHIPPED | OUTPATIENT
Start: 2023-05-26 | End: 2023-11-19

## 2023-05-26 RX ORDER — LOSARTAN POTASSIUM 100 MG/1
100 TABLET ORAL DAILY
Qty: 90 TABLET | Refills: 1 | Status: SHIPPED | OUTPATIENT
Start: 2023-05-26 | End: 2023-10-10

## 2023-05-26 NOTE — PROGRESS NOTES
Subjective     Patient ID: Nicole Jacques is a 73 y.o. female.    Chief Complaint: Hypertension    Last seen 6 months ago. Returns for scheduled f/u chronic  medical conditions. Taking daily meds as prescribed, hypertension is consistently controlled. Much stress lately, an older brother is on hospice, her  has kidney cancer and is scheduled for surgery in about six weeks. She is not exercising, attributes weight loss to decreased appetite. Has had a mild pain across lower abdomen 4-5/10 with no change in bowel or bladder function. No nausea or vomiting. No vaginal bleeding or discharge. Seeing UroGynecology in a couple of weeks.     PMH: , misc x 1.    Hypertension.  Pre-Diabetes, HbA1c up to 6.3%, 5.6% Oct. '22, LDL 88.  GERD, EGD  only showed hiatal hernia.  Depression with Anxiety.    Lumbar Spondylosis.  Mild OA Knees.   Sigmoid Diverticulosis.   Microvascular White Matter disease.     Colonoscopy 3/21 normal, repeat 10 yrs. Mammogram normal . Pelvic exam . BMD normal . Eye exam . Vaccines reviewed.     SOCIAL: No tobacco,  smokes. Social alcohol.  with 2 children. Retired  at [employer].     PSH: BTL, Hysterectomy due to heavy menses, ovaries remain. Left knee arthroscopic surgery due to injury kick boxing. Appendectomy. Bilateral Cataract extractions and Blepharoplasty.     FMH: Father  age 81 with bladder cancer and dementia. Mother  age 78 with hypertension and renal failure, and CHF, possible osteoporosis. Diabetes in grandmothers. Anxiety - mother, sister. Colon cancer in maternal uncle.    Allergies: Bactrim.    Medications: list reviewed and reconciled. Still takes daily Iron tab. Meloxicam only once or twice a week.             Review of Systems   Constitutional:  Negative for activity change, fatigue and fever.   HENT:  Negative for nasal congestion, ear pain, hearing loss, rhinorrhea, sneezing, sore throat, trouble  "swallowing and voice change.    Eyes:  Negative for pain and visual disturbance.   Respiratory:  Negative for cough, chest tightness, shortness of breath and wheezing.    Cardiovascular:  Negative for chest pain, palpitations and leg swelling.   Gastrointestinal:  Negative for blood in stool, constipation, diarrhea, nausea and vomiting.        Reflux is relieved with daily Omeprazole.   Genitourinary:  Negative for dysuria, frequency and vaginal bleeding.   Musculoskeletal:  Negative for arthralgias, gait problem, joint swelling and myalgias.   Integumentary:  Negative for color change and rash.   Neurological:  Negative for dizziness, syncope, facial asymmetry, speech difficulty, weakness, numbness and headaches.   Hematological:  Negative for adenopathy. Does not bruise/bleed easily.   Psychiatric/Behavioral:  Negative for confusion, dysphoric mood and sleep disturbance. The patient is nervous/anxious.         Depression improved on Lexapro, she wishes to continue it the same.        Objective   Vitals:    05/26/23 1114   BP: 108/64   Pulse: 68   Temp: 98.9 °F (37.2 °C)   SpO2: 96%   Weight: 67 kg (147 lb 11.2 oz)      From 156.5 six months ago.   Height: 5' 5" (1.651 m)   BMI=24.6  Physical Exam  Vitals reviewed.   Constitutional:       General: She is not in acute distress.     Appearance: She is well-developed. She is not diaphoretic.   HENT:      Head: Normocephalic and atraumatic.      Right Ear: Tympanic membrane and ear canal normal.      Left Ear: Tympanic membrane and ear canal normal.      Nose: Nose normal. No congestion.      Mouth/Throat:      Mouth: Mucous membranes are moist.      Pharynx: Oropharynx is clear.   Eyes:      General: No scleral icterus.     Extraocular Movements: Extraocular movements intact.      Conjunctiva/sclera: Conjunctivae normal.      Right eye: Right conjunctiva is not injected.      Left eye: Left conjunctiva is not injected.   Neck:      Thyroid: No thyromegaly.      " Vascular: No carotid bruit or JVD.   Cardiovascular:      Rate and Rhythm: Normal rate and regular rhythm.      Pulses: Normal pulses.      Heart sounds: Normal heart sounds. No murmur heard.    No friction rub. No gallop.   Pulmonary:      Effort: Pulmonary effort is normal. No respiratory distress.      Breath sounds: Normal breath sounds. No wheezing, rhonchi or rales.   Abdominal:      General: Bowel sounds are normal. There is no distension.      Palpations: Abdomen is soft. There is no mass.      Tenderness: There is no abdominal tenderness.   Musculoskeletal:         General: No tenderness or deformity. Normal range of motion.      Cervical back: Normal range of motion and neck supple.      Right lower leg: No edema.      Left lower leg: No edema.   Lymphadenopathy:      Cervical: No cervical adenopathy.   Skin:     General: Skin is warm and dry.      Coloration: Skin is not pale.      Findings: No erythema or rash.      Nails: There is no clubbing.   Neurological:      General: No focal deficit present.      Mental Status: She is alert and oriented to person, place, and time.      Cranial Nerves: No cranial nerve deficit.      Motor: No abnormal muscle tone.      Coordination: Coordination normal.      Gait: Gait normal.      Deep Tendon Reflexes: Reflexes are normal and symmetric.   Psychiatric:         Mood and Affect: Mood normal.         Behavior: Behavior normal.         Thought Content: Thought content normal.         Judgment: Judgment normal.          Assessment and Plan     1. Essential hypertension controlled, continue same  -     losartan (COZAAR) 100 MG tablet; Take 1 tablet (100 mg total) by mouth once daily.  Dispense: 90 tablet; Refill: 1  -     continue amlodipine 10mg recently refilled.    2. Pre-diabetes - stable on diet.     3. Major depressive disorder, recurrent, mild        -     continue Lexapro 20 mg.     4. ROSALIA (generalized anxiety disorder)  -     ALPRAZolam (XANAX) 2 MG Tab; Take 1  tablet (2 mg total) by mouth 2 (two) times daily. as needed for anxiety.  Dispense: 60 tablet; Refill: 5    5. Sedative, hypnotic or anxiolytic dependence, uncomplicated        -    she declines weaning down Alprazolam dose.     6. Benign essential tremor - stable.    7. White matter disease of brain due to ischemia  -     pravastatin (PRAVACHOL) 10 MG tablet; Take 1 tablet (10 mg total) by mouth once daily. For Vascular health.  Dispense: 90 tablet; Refill: 1    8. Primary osteoarthritis involving multiple joints  Uses Meloxicam sparingly    9. Bilateral lower abdominal pain  -     CBC Auto Differential; Future; Expected date: 05/26/2023  -     Comprehensive Metabolic Panel; Future; Expected date: 05/26/2023  -     Urinalysis  -     US Abdomen Complete; Future; Expected date: 05/26/2023            Follow up in about 6 months (around 11/26/2023).

## 2023-06-02 ENCOUNTER — HOSPITAL ENCOUNTER (OUTPATIENT)
Dept: RADIOLOGY | Facility: HOSPITAL | Age: 73
Discharge: HOME | End: 2023-06-02
Attending: INTERNAL MEDICINE
Payer: MEDICARE

## 2023-06-02 DIAGNOSIS — R10.32 BILATERAL LOWER ABDOMINAL PAIN: ICD-10-CM

## 2023-06-02 DIAGNOSIS — R10.31 BILATERAL LOWER ABDOMINAL PAIN: ICD-10-CM

## 2023-06-02 PROCEDURE — 76700 US EXAM ABDOM COMPLETE: CPT | Mod: 26,,, | Performed by: STUDENT IN AN ORGANIZED HEALTH CARE EDUCATION/TRAINING PROGRAM

## 2023-06-02 PROCEDURE — 76700 US ABDOMEN COMPLETE: ICD-10-PCS | Mod: 26,,, | Performed by: STUDENT IN AN ORGANIZED HEALTH CARE EDUCATION/TRAINING PROGRAM

## 2023-06-02 PROCEDURE — 76700 US EXAM ABDOM COMPLETE: CPT | Mod: TC,PO

## 2023-06-09 DIAGNOSIS — N39.41 URGE URINARY INCONTINENCE: ICD-10-CM

## 2023-06-10 RX ORDER — OXYBUTYNIN CHLORIDE 5 MG/1
TABLET ORAL
Qty: 180 TABLET | Refills: 3 | OUTPATIENT
Start: 2023-06-10

## 2023-06-10 NOTE — TELEPHONE ENCOUNTER
Quick DC. Inappropriate Request    Refill Authorization Note   Nicole Jacques  is requesting a refill authorization.  Brief Assessment and Rationale for Refill:  Quick Discontinue  Medication Therapy Plan:  D/C 5/26/23: side effects    Medication Reconciliation Completed:  No      Comments:     Note composed:1:40 AM 06/10/2023

## 2023-06-10 NOTE — TELEPHONE ENCOUNTER
No care due was identified.  [facility] Embedded Care Due Messages. Reference number: [id number].   6/09/2023 10:38:37 PM CDT

## 2023-06-13 DIAGNOSIS — N39.41 URGE URINARY INCONTINENCE: ICD-10-CM

## 2023-06-13 RX ORDER — OXYBUTYNIN CHLORIDE 5 MG/1
TABLET ORAL
Qty: 180 TABLET | Refills: 3 | OUTPATIENT
Start: 2023-06-13

## 2023-06-13 NOTE — TELEPHONE ENCOUNTER
No care due was identified.  [facility] Embedded Care Due Messages. Reference number: [id number].   6/13/2023 5:48:32 AM CDT

## 2023-06-13 NOTE — TELEPHONE ENCOUNTER
Refill Decision Note   Niocle Jacques  is requesting a refill authorization.  Brief Assessment and Rationale for Refill:  Quick Discontinue     Medication Therapy Plan:  discontinued on 5/26/2023 by Zara Sorensen MD for the following reason: Side effects.    Medication Reconciliation Completed: No   Comments:     No Care Gaps recommended.     Note composed:11:53 AM 06/13/2023

## 2023-07-08 DIAGNOSIS — N95.2 POSTMENOPAUSAL ATROPHIC VAGINITIS: ICD-10-CM

## 2023-07-14 ENCOUNTER — TELEPHONE (OUTPATIENT)
Dept: OBSTETRICS AND GYNECOLOGY | Facility: CLINIC | Age: 73
End: 2023-07-14
Payer: MEDICARE

## 2023-07-14 NOTE — TELEPHONE ENCOUNTER
Called patient no answer to inform that her script has been phoned into [facility] in [place].

## 2023-07-14 NOTE — TELEPHONE ENCOUNTER
----- Message from Shirley Banda MA sent at 7/14/2023  4:40 PM CDT -----  She needs the estradiol vaginal cream now called in to the [facility] in [facility] # [phone] is the pharmacy #. Pt 's # is [phone]

## 2023-07-14 NOTE — TELEPHONE ENCOUNTER
----- Message from Katlyn Hernando sent at 7/14/2023  4:17 PM CDT -----  Type:  RX Refill Request    Who Called: OSIEL HOPE [[id number]]  Refill or New Rx:refill   RX Name and Strength:estradioL (ESTRACE) 0.01 % (0.1 mg/gram) vaginal cream  How is the patient currently taking it? (ex. 1XDay):Place 1 g vaginally twice a week. - Vaginal  Preferred Pharmacy with phone number:[facility] ([facility] - [facility] [address]   Phone: [phone]  Fax:  [phone]    Local or Mail Order:mail  Ordering Provider:zaida  Would the patient rather a call back or a response via [facility]? Call back   Best Call Back Number:[phone]  Additional Information: Patient indicates she requested her medication be refilled with [facility] but they have had issues. [facility] indicates they have reached out to the providers office but have not been able to get verification to fill the medication. Patient indicates she would like to have the provider or someone from the providers office to authorize the refill. Patient indicates she is almost out of the medication and it has really been helping with the discomfort. Patient indicates she would like a call back with confirmation that the refill has been sent in.

## 2023-07-14 NOTE — TELEPHONE ENCOUNTER
Called patient to inform that her script is at [facility] not [facility] patient said okay I will  script at [facility] its by my house. Also called [facility] to make sure patient had refills.

## 2023-08-09 ENCOUNTER — OFFICE VISIT (OUTPATIENT)
Dept: UROGYNECOLOGY | Facility: CLINIC | Age: 73
End: 2023-08-09
Payer: MEDICARE

## 2023-08-09 VITALS
WEIGHT: 145.75 LBS | DIASTOLIC BLOOD PRESSURE: 80 MMHG | SYSTOLIC BLOOD PRESSURE: 112 MMHG | HEIGHT: 65 IN | BODY MASS INDEX: 24.28 KG/M2

## 2023-08-09 DIAGNOSIS — N39.41 URGE INCONTINENCE: Primary | ICD-10-CM

## 2023-08-09 DIAGNOSIS — N81.11 MIDLINE CYSTOCELE: ICD-10-CM

## 2023-08-09 DIAGNOSIS — N95.2 VAGINAL ATROPHY: ICD-10-CM

## 2023-08-09 PROCEDURE — 99214 PR OFFICE/OUTPT VISIT, EST, LEVL IV, 30-39 MIN: ICD-10-PCS | Mod: 25,S$GLB,, | Performed by: NURSE PRACTITIONER

## 2023-08-09 PROCEDURE — 3008F BODY MASS INDEX DOCD: CPT | Mod: CPTII,S$GLB,, | Performed by: NURSE PRACTITIONER

## 2023-08-09 PROCEDURE — 99999 PR PBB SHADOW E&M-EST. PATIENT-LVL V: CPT | Mod: PBBFAC,,, | Performed by: NURSE PRACTITIONER

## 2023-08-09 PROCEDURE — 1160F PR REVIEW ALL MEDS BY PRESCRIBER/CLIN PHARMACIST DOCUMENTED: ICD-10-PCS | Mod: CPTII,S$GLB,, | Performed by: NURSE PRACTITIONER

## 2023-08-09 PROCEDURE — 3008F PR BODY MASS INDEX (BMI) DOCUMENTED: ICD-10-PCS | Mod: CPTII,S$GLB,, | Performed by: NURSE PRACTITIONER

## 2023-08-09 PROCEDURE — 1126F PR PAIN SEVERITY QUANTIFIED, NO PAIN PRESENT: ICD-10-PCS | Mod: CPTII,S$GLB,, | Performed by: NURSE PRACTITIONER

## 2023-08-09 PROCEDURE — 1159F PR MEDICATION LIST DOCUMENTED IN MEDICAL RECORD: ICD-10-PCS | Mod: CPTII,S$GLB,, | Performed by: NURSE PRACTITIONER

## 2023-08-09 PROCEDURE — 4010F PR ACE/ARB THEARPY RXD/TAKEN: ICD-10-PCS | Mod: CPTII,S$GLB,, | Performed by: NURSE PRACTITIONER

## 2023-08-09 PROCEDURE — 3074F PR MOST RECENT SYSTOLIC BLOOD PRESSURE < 130 MM HG: ICD-10-PCS | Mod: CPTII,S$GLB,, | Performed by: NURSE PRACTITIONER

## 2023-08-09 PROCEDURE — 3074F SYST BP LT 130 MM HG: CPT | Mod: CPTII,S$GLB,, | Performed by: NURSE PRACTITIONER

## 2023-08-09 PROCEDURE — 99214 OFFICE O/P EST MOD 30 MIN: CPT | Mod: 25,S$GLB,, | Performed by: NURSE PRACTITIONER

## 2023-08-09 PROCEDURE — 1101F PR PT FALLS ASSESS DOC 0-1 FALLS W/OUT INJ PAST YR: ICD-10-PCS | Mod: CPTII,S$GLB,, | Performed by: NURSE PRACTITIONER

## 2023-08-09 PROCEDURE — 3079F DIAST BP 80-89 MM HG: CPT | Mod: CPTII,S$GLB,, | Performed by: NURSE PRACTITIONER

## 2023-08-09 PROCEDURE — 1126F AMNT PAIN NOTED NONE PRSNT: CPT | Mod: CPTII,S$GLB,, | Performed by: NURSE PRACTITIONER

## 2023-08-09 PROCEDURE — 99999 PR PBB SHADOW E&M-EST. PATIENT-LVL V: ICD-10-PCS | Mod: PBBFAC,,, | Performed by: NURSE PRACTITIONER

## 2023-08-09 PROCEDURE — 1160F RVW MEDS BY RX/DR IN RCRD: CPT | Mod: CPTII,S$GLB,, | Performed by: NURSE PRACTITIONER

## 2023-08-09 PROCEDURE — 1101F PT FALLS ASSESS-DOCD LE1/YR: CPT | Mod: CPTII,S$GLB,, | Performed by: NURSE PRACTITIONER

## 2023-08-09 PROCEDURE — 3079F PR MOST RECENT DIASTOLIC BLOOD PRESSURE 80-89 MM HG: ICD-10-PCS | Mod: CPTII,S$GLB,, | Performed by: NURSE PRACTITIONER

## 2023-08-09 PROCEDURE — 1159F MED LIST DOCD IN RCRD: CPT | Mod: CPTII,S$GLB,, | Performed by: NURSE PRACTITIONER

## 2023-08-09 PROCEDURE — 3288F FALL RISK ASSESSMENT DOCD: CPT | Mod: CPTII,S$GLB,, | Performed by: NURSE PRACTITIONER

## 2023-08-09 PROCEDURE — 4010F ACE/ARB THERAPY RXD/TAKEN: CPT | Mod: CPTII,S$GLB,, | Performed by: NURSE PRACTITIONER

## 2023-08-09 PROCEDURE — 3288F PR FALLS RISK ASSESSMENT DOCUMENTED: ICD-10-PCS | Mod: CPTII,S$GLB,, | Performed by: NURSE PRACTITIONER

## 2023-08-09 PROCEDURE — 87086 URINE CULTURE/COLONY COUNT: CPT | Performed by: NURSE PRACTITIONER

## 2023-08-09 NOTE — PATIENT INSTRUCTIONS
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  Empty well: wait a minute, lean forward on toilet.    --Avoid dietary irritants (see sheet).  Keep diary x 3-5 days to determine your irritants.  --KEGELS: do 10 in AM and 10 in PM, holding each x 10 seconds.  When you feel urge to go, STOP, KEGEL, and when urge has passed, then go to bathroom.  Consider PT in future.    --URGE: consider anticholinergic. If you are not improved with behavior changes and estrogen cream-- send me a message through the portal and I will try to get gemtesa or myrbetriq approved   --urine culture today    2)vaginal atrophy  --continue vaginal estrogen cream every other day    3)midline cystocele stage 2  --asymtpomatic  --pvr 5 mL    4)RTC prn-- If we start a medication, I will have you follow up in 2-3 months.    Bladder Irritants  Certain foods and drinks have been associated with worsening symptoms of urinary frequency, urgency, urge incontinence, or  bladder pain. If you suffer from any of these conditions, you may wish to try eliminating one or more of these foods from your  diet and see if your symptoms improve. If bladder symptoms are related to dietary factors, strict adherence to a diet that  eliminates the food should bring marked relief in 10 days. Once you are feeling better, you can begin to add foods back into  your diet, one at a time. If symptoms return, you will be able to identify the irritant. As you add foods back to your diet it is  very important that you drink significant amounts of water.  Low-acid fruit substitutions include apricots, papaya, pears and watermelon. Coffee drinkers can drink Kava or other lowacid  instant drinks. Tea drinkers can substitute non-citrus herbal and sun brewed teas. Calcium carbonate co-buffered with  calcium ascorbate can be substituted for Vitamin C. Prelief is a dietary supplement that works as an acid blocker for the  bladder.  Where to get more  information:   Overcoming Bladder Disorders by Shanelle Vega and Anupama Lizama, 1990   You Dont Have to Live with Cystitis! By Myra Hart, 1988  List of Common Bladder Irritants*  Alcoholic beverages  Apples and apple juice  Cantaloupe  Carbonated beverages  Chili and spicy foods  Chocolate  Citrus fruit  Coffee (including decaffeinated)  Cranberries and cranberry juice  Grapes  Guava  Milk Products: milk, cheese, cottage cheese, yogurt, ice cream  Peaches  Pineapple  Plums  Strawberries  Sugar especially artificial sweeteners, saccharin, aspartame, corn sweeteners, honey, fructose, sucrose, lactose  Tea  Tomatoes and tomato juice  Vitamin B complex  Vinegar  *Most people are not sensitive to ALL of these products; your goal is to find the foods that make YOUR  symptoms worse

## 2023-08-09 NOTE — PROGRESS NOTES
[facility] [place]  [address]  [facility] [address]    Nicole Jacques  [id number]  1950 August 9, 2023    Consulting Physician: Lyndsay Mata MD     Primary M.D.: Zara Sorensen MD    Chief Complaint   Patient presents with    Urinary Incontinence       HPI:     1)  UI:  (--) ROCHELLE < (+) UUI  X 5years.  (+) pads:3/day, usually moderate wetness  Daytime frequency: Q 3-4 hours.  Nocturia: Yes: 3-4 night.  Does not limit fluids prior to bedtime. (--) dysuria,  (--) hematuria,  (--) frequent UTIs.  (+) complete bladder emptying. Has tried oxybutynin in the past-- did not tolerate due to drying effects (did help with urgency/ frequency)    2)  POP:  Absent. Symptoms:(--)    (--) vaginal bleeding. (--) vaginal discharge. (+) sexually active.  (--) dyspareunia.   (+)  Vaginal dryness.  (+) vaginal estrogen use.     3)  BM:  (--) constipation/straining.  (--) chronic diarrhea. (--) hematochezia.  (--) fecal incontinence.  (--) fecal smearing/urgency.  (+) complete evacuation.  Takes magnesium supplement    Past Medical History  Past Medical History:   Diagnosis Date    Anxiety     Cataract     Depression     Family history of colon cancer     GERD (gastroesophageal reflux disease)     Hypertension     Lumbar spondylosis     Lumbar spondylosis     Overactive bladder     Urge urinary incontinence         Past Surgical History  Past Surgical History:   Procedure Laterality Date    APPENDECTOMY      CATARACT EXTRACTION W/  INTRAOCULAR LENS IMPLANT Left 06/03/2014    Dr Drake    CATARACT EXTRACTION W/  INTRAOCULAR LENS IMPLANT Right 07/07/2014    COLONOSCOPY N/A 11/02/2015    Procedure: COLONOSCOPY;  Surgeon: Desmond Casillas MD;  Location: [facility] ENDO (4TH FLR);  Service: Endoscopy;  Laterality: N/A;    COLONOSCOPY N/A 03/16/2021    Procedure: COLONOSCOPY;  Surgeon: Desmond Casillas MD;  Location: [facility] (4TH FLR);  Service: Endoscopy;  Laterality: N/A;  covid test 3/13 primary care,  instructions emailed-KPvt    EYE SURGERY      HYSTERECTOMY      partial    KNEE SURGERY Left     TUBAL LIGATION          Hysterectomy: Yes - Date: .  Indication: metrorrhagia.    Type: xlap  Cervix present: No  Ovaries present: Yes -   Other procedures at time of hysterectomy:  appendectomy    Past Ob History     x 2.    Largest infant weight: 7# 5 oz  yes FAVD. no episiotomy.      Gynecologic History  LMP: No LMP recorded. Patient has had a hysterectomy.  Age of menarche: 12  Age of menopause: 34  Menstrual history: normal until after tubal-- began having metrorrhagia  Pap test: 2018 normal no ecc.  History of abnormal paps: No.  History of STIs:  No  Mammogram: Date of last: 2022.  Result: Normal  Colonoscopy: Date of last: 2021.  Result:  diverticula.  Repeat due:  10 years.    DEXA:  Date of last: 2015.  Result:   Elevated BMD of the lumbar spine.       Recommendations:  1) Adequate calcium and Vitamin D therapy  2) Appropriate exercise  3) Consider repeat BMD in 4 years.    Family History  Family History   Problem Relation Age of Onset    Diabetes Paternal Grandmother     Diabetes Maternal Grandmother     Dementia Father     Cancer Father         bladder    Basal cell carcinoma Father     Hypertension Mother     Kidney disease Mother     Heart disease Mother     Diabetes Brother     Hypertension Brother     Hypertension Sister     Diabetes Sister     Hypertension Sister     Breast cancer Sister 54        recurrence at age 64    Diabetes Sister     No Known Problems Daughter     No Known Problems Son     Cancer Maternal Uncle         colon cancer    Amblyopia Neg Hx     Blindness Neg Hx     Cataracts Neg Hx     Glaucoma Neg Hx     Macular degeneration Neg Hx     Retinal detachment Neg Hx     Strabismus Neg Hx     Melanoma Neg Hx       Colon CA: No  Breast CA: Yes - sister  GYN CA: No   CA: Yes - father --bladder    Social History  Social History     Tobacco Use   Smoking Status Never     Passive exposure: Yes   Smokeless Tobacco Never   .    Social History     Substance and Sexual Activity   Alcohol Use Yes    Comment: Social.   .    Social History     Substance and Sexual Activity   Drug Use No       Allergies  Review of patient's allergies indicates:   Allergen Reactions    Sulfamethoxazole-trimethoprim Nausea And Vomiting and Other (See Comments)     Other reaction(s): Migraine       Medications  Current Outpatient Medications on File Prior to Visit   Medication Sig Dispense Refill    ALPRAZolam (XANAX) 2 MG Tab Take 1 tablet (2 mg total) by mouth 2 (two) times daily. as needed for anxiety. 60 tablet 5    amLODIPine (NORVASC) 10 MG tablet TAKE 1 TABLET BY MOUTH ONCE  DAILY 90 tablet 1    ascorbic acid, vitamin C, (VITAMIN C) 1000 MG tablet Take 2 tablets by mouth every morning. Takes two 1000 mg tablets every morning      aspirin (ECOTRIN) 81 MG EC tablet Take 81 mg by mouth every morning.      B-complex with vitamin C (Z-BEC OR EQUIV) tablet Take 1 tablet by mouth every morning.       co-enzyme Q-10 30 mg capsule Take 30 mg by mouth every morning.       EScitalopram oxalate (LEXAPRO) 20 MG tablet TAKE 1 TABLET BY MOUTH ONCE  DAILY 90 tablet 2    estradioL (ESTRACE) 0.01 % (0.1 mg/gram) vaginal cream Place 1 g vaginally twice a week. 42 g 3    estradioL (ESTRACE) 1 MG tablet Take 1 tablet (1 mg total) by mouth once daily. (Patient taking differently: Take 0.5 mg by mouth once daily.) 90 tablet 3    ferrous gluconate (FERGON) 324 MG tablet Take 1 tablet (324 mg total) by mouth daily with breakfast. 90 tablet 0    losartan (COZAAR) 100 MG tablet Take 1 tablet (100 mg total) by mouth once daily. 90 tablet 1    LUBRICANT EYE DROPS, GLYC-PG, 1-0.3 % Drop Place 1 drop into both eyes as needed.       meloxicam (MOBIC) 15 MG tablet TAKE 1 TABLET BY MOUTH DAILY AS  NEEDED FOR PAIN 90 tablet 0    MULTIVITAMIN (MULTIPLE VITAMIN ESSENTIAL ORAL) Take 1 tablet by mouth every morning.       omeprazole  "(PRILOSEC) 20 MG capsule TAKE 1 CAPSULE BY MOUTH  ONCE DAILY 90 capsule 3    pravastatin (PRAVACHOL) 10 MG tablet Take 1 tablet (10 mg total) by mouth once daily. For Vascular health. 90 tablet 1    VITAMIN D3 5,000 unit Tab Take 5,000 Units by mouth every morning.       omega-3 fatty acids 500 mg Cap Take 1 capsule by mouth every morning.        No current facility-administered medications on file prior to visit.       Review of Systems A 14 point ROS was reviewed with pertinent positives as noted above in the history of present illness.      Constitutional: negative  Eyes: negative  Endocrine: negative  Gastrointestinal: negative  Cardiovascular: negative  Respiratory: negative  Allergic/Immunologic: negative  Integumentary: negative  Psychiatric: negative  Musculoskeletal: negative   Ear/Nose/Throat: negative  Neurologic: negative  Genitourinary: SEE HPI  Hematologic/Lymphatic: negative   Breast: negative    Urogynecologic Exam  /80 (BP Location: Left arm, Patient Position: Sitting, BP Method: Medium (Manual))   Ht 5' 5" (1.651 m)   Wt 66.1 kg (145 lb 11.6 oz)   BMI 24.25 kg/m²     GENERAL APPEARANCE:  The patient is well-developed, well-nourished.   Neck:  Supple with no thyromegaly, no carotid bruits.  Heart:  Regular rate and rhythm, no murmurs, rubs or gallops.  Lungs:  Clear.  No CVA tenderness.  Abdomen:  Soft, nontender, nondistended, no hepatosplenomegaly.      PELVIC:    External genitalia:  Normal Bartholins, Skenes and labia bilaterally.    Urethra:  No caruncle, diverticulum or masses.  (+) hypermobility.    Vagina:  Atrophy (+) , no bladder masses or tender, no discharge.    Cervix:  absent  Uterus: uterus absent  Adnexa: Not palpable.    POP-Q:  Aa -1; Ba -1; C -7; Ap -3; Bp -3; D n/a.  Genital hiatus 3, perineal body 2 total vaginal length 9.      NEUROLOGIC:  Cranial nerves 2 through 12 intact.  Strength 5/5.  DTRs 2+ lower extremities.  S2 through 4 normal.  Sacral reflexes " intact.    EXT: LINDQUIST, 2+ pulses bilaterally, no C/C/E    COUGH STRESS TEST:  negative  KEGEL: 3 /5    RECTAL:    External:  Normal, (--) hemorrhoids, (--) dovetailing.   Internal:  deferred    PVR: 5 mL mL    Impression    1. Urge incontinence    2. Vaginal atrophy    3. Midline cystocele        Initial Plan  The patient was counseled regarding these issues. The patient was given a summary sheet containing each of these issues with possible options for evaluation and management. When appropriate, we also reviewed computer-generated diagrams specific to their diagnoses..  All questions were addressed to the patient's satisfaction.     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  Empty well: wait a minute, lean forward on toilet.    --Avoid dietary irritants (see sheet).  Keep diary x 3-5 days to determine your irritants.  --KEGELS: do 10 in AM and 10 in PM, holding each x 10 seconds.  When you feel urge to go, STOP, KEGEL, and when urge has passed, then go to bathroom.  Consider PT in future.    --URGE: consider anticholinergic. If you are not improved with behavior changes and estrogen cream-- send me a message through the portal and I will try to get gemtesa or myrbetriq approved   --urine culture today    2)vaginal atrophy  --continue vaginal estrogen cream every other day    3)midline cystocele stage 2  --asymtpomatic  --pvr 5 mL    4)RTC prn-- If we start a medication, I will have you follow up in 2-3 months.      I spent a total of 30 minutes on the day of the visit.  This includes face to face time and non-face to face time preparing to see the patient (eg, review of tests), obtaining and/or reviewing separately obtained history, documenting clinical information in the electronic or other health record, independently interpreting results and communicating results to the patient/family/caregiver, or care coordinator.     Thank you for requesting consultation of your patient.   I look forward to participating in their care.    Dinorah Szymanski  Female Pelvic Medicine and Reconstructive Surgery  [facility] [place], [place]

## 2023-08-10 LAB — BACTERIA UR CULT: NO GROWTH

## 2023-08-16 ENCOUNTER — TELEPHONE (OUTPATIENT)
Dept: PRIMARY CARE CLINIC | Facility: CLINIC | Age: 73
End: 2023-08-16
Payer: MEDICARE

## 2023-08-16 DIAGNOSIS — Z12.31 SCREENING MAMMOGRAM FOR BREAST CANCER: Primary | ICD-10-CM

## 2023-08-16 NOTE — TELEPHONE ENCOUNTER
----- Message from Ashok Moon sent at 8/16/2023  4:47 PM CDT -----  Regarding: Patient advice  Contact: Pt  Pt called in regards to getting an order for a mammogram screening       Pt can be reached at [phone]

## 2023-08-23 DIAGNOSIS — M15.9 PRIMARY OSTEOARTHRITIS INVOLVING MULTIPLE JOINTS: ICD-10-CM

## 2023-08-24 RX ORDER — MELOXICAM 15 MG/1
TABLET ORAL
Qty: 90 TABLET | Refills: 1 | Status: SHIPPED | OUTPATIENT
Start: 2023-08-24 | End: 2023-12-03

## 2023-09-26 ENCOUNTER — PATIENT MESSAGE (OUTPATIENT)
Dept: UROGYNECOLOGY | Facility: CLINIC | Age: 73
End: 2023-09-26
Payer: MEDICARE

## 2023-09-26 DIAGNOSIS — N39.41 URGE INCONTINENCE: Primary | ICD-10-CM

## 2023-09-27 DIAGNOSIS — Z78.0 MENOPAUSE: ICD-10-CM

## 2023-10-06 ENCOUNTER — HOSPITAL ENCOUNTER (OUTPATIENT)
Dept: RADIOLOGY | Facility: HOSPITAL | Age: 73
Discharge: HOME | End: 2023-10-06
Attending: INTERNAL MEDICINE
Payer: MEDICARE

## 2023-10-06 DIAGNOSIS — Z78.0 MENOPAUSE: ICD-10-CM

## 2023-10-06 PROCEDURE — 77080 DXA BONE DENSITY AXIAL SKELETON 1 OR MORE SITES: ICD-10-PCS | Mod: 26,,, | Performed by: RADIOLOGY

## 2023-10-06 PROCEDURE — 77080 DXA BONE DENSITY AXIAL: CPT | Mod: TC,PN

## 2023-10-06 PROCEDURE — 77080 DXA BONE DENSITY AXIAL: CPT | Mod: 26,,, | Performed by: RADIOLOGY

## 2023-10-09 ENCOUNTER — PATIENT MESSAGE (OUTPATIENT)
Dept: PRIMARY CARE CLINIC | Facility: CLINIC | Age: 73
End: 2023-10-09
Payer: MEDICARE

## 2023-10-10 DIAGNOSIS — I10 ESSENTIAL HYPERTENSION: ICD-10-CM

## 2023-10-10 RX ORDER — LOSARTAN POTASSIUM 100 MG/1
100 TABLET ORAL
Qty: 90 TABLET | Refills: 2 | Status: SHIPPED | OUTPATIENT
Start: 2023-10-10

## 2023-10-11 ENCOUNTER — TELEPHONE (OUTPATIENT)
Dept: PRIMARY CARE CLINIC | Facility: CLINIC | Age: 73
End: 2023-10-11
Payer: MEDICARE

## 2023-10-11 NOTE — TELEPHONE ENCOUNTER
Care Due:                  Date            Visit Type   Department     Provider  --------------------------------------------------------------------------------                                EP -                              PRIMARY      [facility] PRIMARY   Zara Valerie  Last Visit: 05-      CARE ([facility])   CARE           [place]                              EP -                              PRIMARY      [facility] PRIMARY   Zara Valerie  Next Visit: 12-      CARE ([facility])   CARE           [place]                                                            Last  Test          Frequency    Reason                     Performed    Due Date  --------------------------------------------------------------------------------    Lipid Panel.  12 months..  pravastatin..............  10-   10-    [employer] Embedded Care Due Messages. Reference number: [id number].   10/10/2023 10:24:42 PM CDT

## 2023-10-11 NOTE — TELEPHONE ENCOUNTER
Provider Staff:  Action required for this patient     Please see care gap opportunities below in Care Due Message.    Thanks!  [facility]     Appointments      Date Provider   Last Visit   5/26/2023 Zara Sorensen MD   Next Visit   12/1/2023 Zara Sorensen MD     Refill Decision Note   Nicole Jacques  is requesting a refill authorization.  Brief Assessment and Rationale for Refill:  Approve     Medication Therapy Plan:         Comments:     Note composed:10:34 PM 10/10/2023

## 2023-10-11 NOTE — TELEPHONE ENCOUNTER
Answer is yes to everyone over age 60, and I don't specify when or where. Same goes for Flu and new COVID vaccines - everybody. In what ever order they want.

## 2023-10-11 NOTE — TELEPHONE ENCOUNTER
----- Message from Annita Ga sent at 10/11/2023 10:51 AM CDT -----  Contact: Mobile [phone]  Patient would like to know if she should get the RSV vaccine?

## 2023-10-20 ENCOUNTER — CLINICAL SUPPORT (OUTPATIENT)
Dept: REHABILITATION | Facility: HOSPITAL | Age: 73
End: 2023-10-20
Payer: MEDICARE

## 2023-10-20 DIAGNOSIS — M62.89 PELVIC FLOOR DYSFUNCTION: ICD-10-CM

## 2023-10-20 DIAGNOSIS — N39.41 URGE URINARY INCONTINENCE: Primary | ICD-10-CM

## 2023-10-20 DIAGNOSIS — N39.41 URGE INCONTINENCE: ICD-10-CM

## 2023-10-20 PROCEDURE — 97530 THERAPEUTIC ACTIVITIES: CPT | Mod: PO

## 2023-10-20 PROCEDURE — 97161 PT EVAL LOW COMPLEX 20 MIN: CPT | Mod: PO

## 2023-10-20 PROCEDURE — 97140 MANUAL THERAPY 1/> REGIONS: CPT | Mod: PO

## 2023-10-20 NOTE — PLAN OF CARE
"[facility]   Physical Therapy Initial Evaluation        Date: 10/20/2023   Name: Nicole Jacques  Clinic Number: [id number]    Therapy Diagnosis:   Encounter Diagnoses   Name Primary?    Urge incontinence     Pelvic floor dysfunction     Urge urinary incontinence Yes     Physician: Dinorah Szymanski NP    Physician Orders: PT Eval and Treat   Medical Diagnosis from Referral: Urge incontinence [N39.41]  Evaluation Date: 10/20/2023  Authorization Period Expiration: 2024  Plan of Care Expiration: 2024  Progress Note Due: 2023  Visit # / Visits authorized:    FOTO: 1/3    Precautions: Standard     Time In: 10:03 am  Time Out: 11: 15 am  Total Appointment Time (timed & untimed codes): 72 minutes    SUBJECTIVE       Date of onset: 5+ years ago symptoms began    History of current condition - Nicole reports: she has been having incontinence for years that is progressively getting worse. States that she drinks plenty of water, but the more she drinks, the more she has to go to the bathroom. She runs to the restroom any time she takes a ride in the car and is tired of wearing pads. She is wearing thicker pads now as leakage has progressed. She has been using estradiol cream that she has used every other day for the last year or so which has helped. States she goes more during the night (2-3x/night) for "about a year or two". States she tries to stop drinking water before 7, but will sometimes forget. Vaginal pressure felt when she has a strong urge. Started feeling pain in both sides of her stomach for a while, but seems to have gotten better. Ultrasound showed no abdominal abnormalities. Has low iron that is being addressed with medication. States she has tremors since she was in high school that her doctors gave her Lexapro for as they believe it is anxiety induced. Zanax really helps her tremors and she only takes it as needed.    OB/GYN History: ; vaginal " deliveries, does not remember any tearing or stitches  Using vaginal estrogen cream: Yes, for about a year now.   Sexually active? Yes, but had vaginal dryness when taking Omeprazole. Estrogen cream has helped. Uses lubrication which helps some.   Pelvic Pain with: none reported  Pelvic pressure? No    Bladder/Bowel History: constipation/straining for movement during pregnancy due to iron shots from anemia. Constipation when she was younger as well.  Frequency of urination:   Daytime: 3x during the work day. 3-4 times prior to work in the morning. 8-10x/day           Nighttime: 2-3x/night  Difficulty initiating urine stream: No  Urine stream: strong  Complete emptying: Yes  Bladder leakage: Yes  Activities that cause leakage: when she stands after drinking fluids  Frequency of incidents: several times a day. ~4-5x/day and at night   Amount leaked (urine): full emptying  Urinary Urgency: Yes.  Unable to delay the urge sometimes as it is immediate. Can sometimes delay up to 15 minutes when she is distracted at work.  Pain with delaying the urge to urinate: No     Frequency of bowel movements: once a day  Difficulty initiating BM: Not since beginning magnesium  Quality/Shape of BM: [place] Stool Chart between type 1-3; varies with diet  Does Patient Feel Empty after BM? Yes  Bowel Urgency: No.  Able to delay the urge for at least 15 minute(s).  Fiber Supplements or Laxative Use? No; eats a lot of fiber and uses magnesium to sleep at night that has seemed to help with her bowel movements  Pain with BM: No   Bleeding with BM: No   Colon leakage: No    Form of protection: poise pads  Number of pads required in 24 hours: 3-4/day    Types of fluid intake: drinks about 2 bottles of water/day, electrolyte drinks  Diet: on a kidney friendly diet due to her ; eats a lot of fiber, fruits and  vegetables  Habitus: well developed, well nourished  Abuse/Neglect: Pt denies a history of physical or emotional abuse at this  "visit.       Pain:  Location: patient denies pain    Medical History: Nicole  has a past medical history of Anxiety, Cataract, Depression, Family history of colon cancer, GERD (gastroesophageal reflux disease), Hypertension, Lumbar spondylosis, Lumbar spondylosis, Overactive bladder, and Urge urinary incontinence.     Surgical History: Nicole Jacques  has a past surgical history that includes Knee surgery (Left); Appendectomy; Tubal ligation; Cataract extraction w/  intraocular lens implant (Left, 06/03/2014); Cataract extraction w/  intraocular lens implant (Right, 07/07/2014); Colonoscopy (N/A, 11/02/2015); Hysterectomy; Eye surgery; and Colonoscopy (N/A, 03/16/2021).    Medications: Nicole has a current medication list which includes the following prescription(s): alprazolam, amlodipine, ascorbic acid (vitamin c), aspirin, b-complex with vitamin c, co-enzyme q-10, escitalopram oxalate, estradiol, estradiol, ferrous gluconate, losartan, lubricant (p-glycol-glycerin), meloxicam, multivitamin, omeprazole, pravastatin, and vitamin d3.    Allergies:   Review of patient's allergies indicates:   Allergen Reactions    Sulfamethoxazole-trimethoprim Nausea And Vomiting and Other (See Comments)     Other reaction(s): Migraine        Imaging : abdominal ultrasound 06/02/2023    "Impression:     No significant abnormality."       Prior Therapy/Previous treatment included: None for this condition  Social History/Living Arrangements: lives with their spouse  Current exercise: yoga and walking at [employer]; used to play tennis  Occupation: works at [employer]  Prior Level of Function: Pt was independent with all ADLs and iADLs without pain, no reports of incontinence of bowel or bladder.  Current Level of Function: independent with elevated awareness of pad usage secondary to urinary leakage.      Constitutional Symptoms Review: The patient denies having any constitutional symptoms.     Flags Screening  Red Flags:The patient " "was screened for red flags and none were identified.      Pts goals: patient would like to decrease the amount of leakage experienced daily    OBJECTIVE     See EMR under MEDIA for written consent provided 10/20/2023  Chaperone: declined    ORTHO SCREEN  Posture in sitting: slouched   Posture in standing: forward head and minimal kyphotic positioning  Pelvic alignment: no sign of deviations noted in supine   SI Joint Palpation: Denies tenderness to SI joint palpation bilaterally.  Adductor Palpation: tender, increased tension , hypertonic, and trigger points    Squat: excessive anterior tibia translation accompanied by anterior weight shift     Lumbar Active Range of Motion:  WNL in all planes without patient reports of discomfort    Gait Analysis: narrow base of support        Balance Reactions:  not assessed today    ABDOMINAL WALL ASSESSMENT  Palpation: tender and increased tension ; recreates symptoms of urgency along lower abdominal wall  Abdominal strength: Rectus abdominus: moderate     Transverse abdominus: good activation, poor endurance hold  Scarring: vertical incision of lower abdomen post hysterectomy "years ago"  Pelvic Floor Muscle and Transverse Abdominus Synergy: present  Diastasis: absent     BREATHING MECHANICS ASSESSMENT   Thorax Assessment During Quiet Respiration: Decreased excursion of abdominal wall , Decreased excursion bilaterally of lateral ribs , and moderate excursion of sternum  Thorax Assessment During Deep Respiration: Decreased excursion of abdominal wall , Decreased excursion bilaterally of lateral ribs , and Excessive excursion of sternum    VAGINAL PELVIC FLOOR EXAM    EXTERNAL ASSESSMENT  Introitus: minimal gaping  Skin condition: redness noted from consistent wear of poise pads   Scarring: none noted   Sensation: WNL   Pain: none  Voluntary contraction: visible lift  Voluntary relaxation: nil  Involuntary contraction: visible drop  Bearing down: contraction followed by good " drop  Perineal descent: minimal to none      INTERNAL ASSESSMENT  Pain: tender areas noted as follows: posterior wall of pubic ramus, pubococcygeus (recreates symptoms of discomfort and urgency). Trigger points of right levator ani and obturator internus   Sensation: able to localized pressure appropriately   Vaginal vault: asymmetrical   Muscle Bulk: WFL with areas of hypertonus on right layer 3  Muscle Power: 2/5  Muscle Endurance: 10 sec  # Reps To Fatigue: 2    Fast Contractions in 10 seconds: 2     Quality of contraction: decreased hold and incomplete relaxation   Specificity: patient contracts: adductors   Coordination: cannot isolate PFM from abdominals   Prolapse check: anterior wall lengthening of vaginal vault >2 cm above level of introitus  Does Pelvic Floor drop and relax with a diaphragmatic breath? yes, minimally with verbal cues    Intake Outcome Measures for FOTO Urinary Problem Survey    Therapist reviewed FOTO scores for Nicole Jacques on 10/20/2023.     FOTO report- see Media section in Epic or account episode details in FOTO.      Intake Score:   42         TREATMENT        Treatment Time In: 10:50 am  Treatment Time Out: 11:20 am  Total Treatment time (time-based codes) separate from Evaluation: 32 minutes    Manual Therapy to develop flexibility, extensibility, and desensitization for 8 minutes including:   [x]trigger point/myofascial release of bilateral pubococcygeus and visceral mobilization of lower abdominal wall along lateral border of bladder    Therapeutic Activity Patient participated in dynamic functional therapeutic activities to improve functional performance for 24 minutes. Including: Education as described below.     [x] bladder irritants, anatomy/physiology of pelvic floor, posture/body mechanices, bladder retraining, diaphragmatic breathing, isometric abdominal exercises, kegels, proper bearing down techniques, fluid intake/dietary modifications, and behavior modifications    [x] Instruction on diaphragmatic breathing   [x] Education on visual cues/mental imagery for pelvic floor relaxation  [] Instruction on the Knack for reduction of stress urinary incontinence  [x] Pelvic anatomy edu and impact on current level of function   [x] HEP building/HEP review      Written Home Exercises and Education Provided: yes. Exercises were reviewed and Nicole was able to demonstrate them prior to the end of the session.  Nicole demonstrated good  understanding of the education provided. See EMR under Patient Instructions for exercises and education provided during therapy sessions.      ASSESSMENT     Nicole is a 73 y.o. female referred to outpatient Physical Therapy with a medical diagnosis of Urge incontinence [N39.41]. Pt presents with altered posture, pelvic asymmetry, poor knowledge of body mechanics and posture, adhered abdominal scar, poor trunk stability, pelvic floor tenderness, decreased pelvic muscle strength, decreased endurance of the pelvic muscles, decreased phasic ability of the pelvic muscles, increased tension of the pelvic muscles, poor quality of pelvic muscle contraction, increased frequency of urination, increased nocturia, poor coordination of pelvic floor muscles during ADL's leading to urinary or fecal leakage, poor fluid intake, dysfunctional voiding, and unable to co-contract or co-relax abdominal wall and pelvic floor muscles. Nicole responded well to brief soft tissue mobilization of tissues reproducing symptoms of urgency as evidenced in note above. Diaphragmatic breathing is difficult for patient to coordinate, but it did improve slightly following verbal and tactile cues. Overall, patient is expected to do well with skilled physical therapy addressing progressing urgency and pelvic floor dysfunction.      Pt prognosis is Good.   Pt will benefit from skilled outpatient Physical Therapy to address the deficits stated above and in the chart below, provide pt/family  education, and to maximize pt's level of independence.     Plan of care discussed with patient: Yes  Pt's spiritual, cultural and educational needs considered and patient is agreeable to the plan of care and goals as stated below:     Anticipated Barriers for therapy: none    Medical Necessity is demonstrated by the following  History  Co-morbidities and personal factors that may impact the plan of care [] LOW: no personal factors / co-morbidities  [] MODERATE: 1-2 personal factors / co-morbidities  [x] HIGH: 3+ personal factors / co-morbidities    Moderate / High Support Documentation:   Co-morbidities affecting plan of care: depression, anxiety, hypertension, pre-diabetes, GERD    Personal Factors:   no deficits     Examination  Body Structures and Functions, activity limitations and participation restrictions that may impact the plan of care [x] LOW: addressing 1-2 elements  [] MODERATE: 3+ elements  [] HIGH: 4+ elements (please support below)    Moderate / High Support Documentation: lumbar spondylosis, osteoarthritis     Clinical Presentation [x] LOW: stable  [] MODERATE: Evolving  [] HIGH: Unstable     Decision Making/ Complexity Score: low         Goals:  Short Term Goals: 4 weeks     Pt to be able to perform a 10 second kegel x 5 reps to demonstrate improving strength and endurance needed for continence.  Patient to demonstrate proper use of urge delay strategies to help reduce urge urinary incontinence with activities of daily living.   Pt to report a decrease in pad usage to no more than 2-3 a day to demonstrate improving pelvic floor function needed for continence.  Pt to be able to delay the urge to urinate at least 5 minutes with a strong urge to urinate in order to make it to the bathroom without leaking.  Pt to demonstrate proper diaphragmatic breathing to help with calming the nervous system in order to decrease pain and to improve abdominal wall musculature extensibility.     Long Term Goals: 12  weeks   Pt to be discharged with home plan for carry over after discharge.    Pt to be able to perform a 10 second kegel x 10 reps to demonstrate improving strength and endurance needed for continence.  Pt to report a decrease in pad usage to </= 1 pads a day to demonstrate improving pelvic floor muscle controls as evidenced by decreased episodes of incontinence needed to improve confidence in social situations.   Pt to demonstrate an improved score in the FOTO Urinary Problem survey  to at least 54 to demonstrate improving pelvic floor function aiding in continence during activities of daily living.    Pt to increase pelvic floor strength to at least 4/5 to demonstrate improved strength needed for continence with ADLs.   Pt to report a decrease in nocturia to no more than 1-2x/night for improved restorative sleep.      PLAN     Plan of care Certification: 10/20/2023 to 01/19/2024.    Outpatient Physical Therapy 1-2 times weekly for 12 weeks to include the following interventions: therapeutic exercises, therapeutic activity, neuromuscular re-education, gait training, manual therapy, modalities PRN, patient/family education, dry needling, and self care/home management    Tomasa Montes, PT, DPT    10/20/2023

## 2023-10-20 NOTE — PATIENT INSTRUCTIONS
Home Exercise Program: 10/20/2023      DIAPHRAGMATIC BREATHING     The diaphragm is a dome shaped muscle that forms the floor of the rib cage. It is the most efficient muscle for breathing and relaxation, although most people are not used to using the diaphragm. Diaphragmatic or belly breathing is an important technique to learn because it helps settle down or relax the autonomic nervous system. The correct use of diaphragmatic breathing can help to quiet brain activity resulting in the relaxation of all the muscles and organs of the body. This is accomplished by slow rhythmic breathing concentrated in the diaphragm muscle rather than the chest.    How to do proper relaxation breathing:    Start by lying on your back or reclining in a chair in a relaxed position. Place one hand on your chest and the other on your abdomen.  Relax your jaw by placing your tongue on the floor of your mouth and keeping your teeth slightly apart.   Take a deep breath in, letting the abdomen expand and rise while you keep your upper chest, neck and shoulders relaxed.   As you breathe out, allow your abdomen and chest to fall. Exhale completely.  It doesn't matter if you breathe in/out through your nose and/or mouth. Do whichever feels comfortable.  Remember to breathe slowly.  Do not force your breathing. Do not hold your breath.  Repeat for 10-15 minutes every day (you can break this up into 5 minutes at a time).     CONTROLLING URINARY / FECAL URGENCY      WHEN YOU EXPERIENCE A STRONG URGE TO URINATE OR DEFECATE:    FIRST Stop activity, stand quietly or sit down. Try to stay very still to maintain control. Avoid rushing to the toilet.    SECOND Begin Quick Flicks (1 second LIFT of pelvic floor muscles, 4 second DROP). Pelvic floor contractions send a message to the bladder to relax and hold urine.     THIRD Relax. Do not rush to the toilet. Take a deep belly or diaphragmatic breath and let it out slowly. Let the urge to urinate pass by  "using distraction techniques and positive thoughts. Try not to think about going to the bathroom.    FINALLY If the urge returns, repeat the above steps to regain control. When you feel the urge subside, walk normally to the bathroom. You can urinate once the urge has subsided.     .    Home Exercise Program: 10/20/2023    "Roll for Control"  Strategies to Delay Voiding    What to do when you feel like you "gotta go gotta go!"     Stop what you are doing.    Take a few good deep breaths (this settles down your nervous system and relaxes your bladder).    WHILE YOU CONTINUE DEEP BREATHING, activate your pelvic floor by performing several quick contractions and releases.  (Pelvic floor contractions send a message to the bladder to relax and hold urine.)  Sitting: Roll thigh in and out slowly or squeeze knees together  Standing: Roll thigh in/out slowly and gently    As you do the above, you can also count backwards from 100 (to help get your mind off the urge!).       After the urge to void has quietly, you may be able to process with your activity OR if it has been approximately 3 hours since you've voided, you may choose to go to the bathroom and void.        Remember......"Control your bladder before it controls YOU!"   "

## 2023-10-27 ENCOUNTER — CLINICAL SUPPORT (OUTPATIENT)
Dept: REHABILITATION | Facility: HOSPITAL | Age: 73
End: 2023-10-27
Payer: MEDICARE

## 2023-10-27 DIAGNOSIS — M62.89 PELVIC FLOOR DYSFUNCTION: Primary | ICD-10-CM

## 2023-10-27 DIAGNOSIS — N39.41 URGE URINARY INCONTINENCE: ICD-10-CM

## 2023-10-27 PROCEDURE — 97112 NEUROMUSCULAR REEDUCATION: CPT | Mod: PO

## 2023-10-27 PROCEDURE — 97140 MANUAL THERAPY 1/> REGIONS: CPT | Mod: PO

## 2023-10-27 PROCEDURE — 97530 THERAPEUTIC ACTIVITIES: CPT | Mod: PO

## 2023-10-27 NOTE — PATIENT INSTRUCTIONS
Abdominal Bracing with Pelvic Floor Muscle Contraction    Perform lying down with knees bent     Start by taking a deep breath in, then as you exhale (think about gently blowing out birthday candles) draw your pelvic floor and abdomen in. You should feel a firming about your mid-section and in your pelvic floor. (Do not think about sucking in like you're fitting into a pair of skinny jeans).   Be sure to fully relax in between each repetition.     Hold for 5 seconds. Perform 2 sets of 10 repetitions. Perform two times a day.

## 2023-10-28 DIAGNOSIS — K21.9 GASTROESOPHAGEAL REFLUX DISEASE: ICD-10-CM

## 2023-10-28 DIAGNOSIS — I10 ESSENTIAL HYPERTENSION: ICD-10-CM

## 2023-10-29 RX ORDER — AMLODIPINE BESYLATE 10 MG/1
TABLET ORAL
Qty: 90 TABLET | Refills: 1 | Status: SHIPPED | OUTPATIENT
Start: 2023-10-29 | End: 2024-03-21

## 2023-10-29 RX ORDER — OMEPRAZOLE 20 MG/1
CAPSULE, DELAYED RELEASE ORAL
Qty: 90 CAPSULE | Refills: 1 | Status: SHIPPED | OUTPATIENT
Start: 2023-10-29 | End: 2024-03-21

## 2023-10-29 NOTE — TELEPHONE ENCOUNTER
Refill Decision Note   Nicole Jacques  is requesting a refill authorization.  Brief Assessment and Rationale for Refill:  Approve     Medication Therapy Plan:         Comments:     Note composed:8:51 AM 10/29/2023

## 2023-10-29 NOTE — TELEPHONE ENCOUNTER
No care due was identified.  [facility] Embedded Care Due Messages. Reference number: [id number].   10/28/2023 10:07:30 PM CDT

## 2023-11-09 ENCOUNTER — CLINICAL SUPPORT (OUTPATIENT)
Dept: REHABILITATION | Facility: HOSPITAL | Age: 73
End: 2023-11-09
Payer: MEDICARE

## 2023-11-09 ENCOUNTER — TELEPHONE (OUTPATIENT)
Dept: UROGYNECOLOGY | Facility: CLINIC | Age: 73
End: 2023-11-09
Payer: MEDICARE

## 2023-11-09 DIAGNOSIS — N39.41 URGE URINARY INCONTINENCE: ICD-10-CM

## 2023-11-09 DIAGNOSIS — M62.89 PELVIC FLOOR DYSFUNCTION: Primary | ICD-10-CM

## 2023-11-09 PROCEDURE — 97530 THERAPEUTIC ACTIVITIES: CPT | Mod: PO

## 2023-11-09 PROCEDURE — 97112 NEUROMUSCULAR REEDUCATION: CPT | Mod: PO

## 2023-11-09 PROCEDURE — 97140 MANUAL THERAPY 1/> REGIONS: CPT | Mod: PO

## 2023-11-09 NOTE — PATIENT INSTRUCTIONS
Home Exercise Program: 11/09/2023    DIAPHRAGMATIC BREATHING     The diaphragm is a dome shaped muscle that forms the floor of the rib cage. It is the most efficient muscle for breathing and relaxation, although most people are not used to using the diaphragm. Diaphragmatic or belly breathing is an important technique to learn because it helps settle down or relax the autonomic nervous system. The correct use of diaphragmatic breathing can help to quiet brain activity resulting in the relaxation of all the muscles and organs of the body. This is accomplished by slow rhythmic breathing concentrated in the diaphragm muscle rather than the chest.    How to do proper relaxation breathing:    Start by lying on your back or reclining in a chair in a relaxed position. Place one hand on your chest and the other on your abdomen.  Relax your jaw by placing your tongue on the floor of your mouth and keeping your teeth slightly apart.   Take a deep breath in, letting the abdomen expand and rise while you keep your upper chest, neck and shoulders relaxed.   As you breathe out, allow your abdomen and chest to fall. Exhale completely.  It doesn't matter if you breathe in/out through your nose and/or mouth. Do whichever feels comfortable.  Remember to breathe slowly.  Do not force your breathing. Do not hold your breath.  Repeat for 10-15 minutes every day.    **if you are having trouble coordinating the breath in while allowing the belly to full, remember to use your hand for pressure or even add a book onto your stomach to watch it elevate with an inhale       Abdominal Bracing with Pelvic Floor Muscle Contraction    Perform lying down with knees bent     Start by taking a deep breath in, then as you exhale (think about gently blowing out birthday candles) draw your pelvic floor and abdomen in. You should feel a firming about your mid-section and in your pelvic floor. (Do not think about sucking in like you're fitting into a pair  of skinny jeans).   Be sure to fully relax in between each repetition.     Hold for 5 seconds. Perform 2 sets of 10 repetitions. Perform 1-2x/daily. Make sure to breathe IN and RELAX abdomen/pelvic floor after EVERY squeeze so that you are not increasing the tension in your pelvic floor too much again :)    Follow up about playing Tennis!!

## 2023-11-09 NOTE — PROGRESS NOTES
Pelvic Health Physical Therapy   Treatment Note     Name: Nicole Jacques  Clinic Number: [id number]    Therapy Diagnosis:   Encounter Diagnoses   Name Primary?    Pelvic floor dysfunction Yes    Urge urinary incontinence        Physician: Dinorah Szymanski NP    Visit Date: 11/9/2023    Physician Orders: PT Eval and Treat   Medical Diagnosis from Referral: Urge incontinence [N39.41]  Evaluation Date: 10/20/2023  Authorization Period Expiration: 12/28/2023  Plan of Care Expiration: 01/19/2024  Progress Note Due: 11/20/2023  Visit # / Visits authorized: 3/ 24   FOTO: 1/3 (provide next visit)  Cancelled Visits: 0  No Show Visits: 0    Time In: 10:50 am  Time Out: 11:58 am  Total Billable Time: 68 minutes    Precautions: Standard    Subjective     Pt reports: she is doing alright. She is doing okay with incontinence during the day but still has problems at night. States she pees more at night but is not getting up like she used to. When she wakes up in the morning, she has to hurry and go to the restroom. She will leak during the night if she drinks water. States the pad can be pretty wet when she wakes up. She had to stop the oxybutynin due to the drying side effects. Used the AZO bladder control which used to help a little. She is seeing her primary care doctor December 1st.     She was compliant with home exercise program.  Response to previous treatment: no adverse reaction  Functional change: improving urgency     Pain: 0/10  Location:  not applicable      Objective     Nicole received therapeutic exercises to develop  strength, endurance, ROM, flexibility, posture, and core stabilization for 0 minutes including:       Nicole received the following manual therapy techniques: to develop flexibility, extensibility, and desensitization for 15 minutes including:     [x] Trigger point/myofascial release of bilateral pelvic floor layers 2 & 3  [x] Periurethral and bladder neck fascial release    Nicole participated  in neuromuscular re-education activities to develop Coordination, Control, Down training, Posture, Proprioception, Kinesthetic, Balance, and Sense for 40 minutes including:    [x] Pelvic floor relaxation speed after performing a Kegel x 15  [x] Kegel's in supine 2 x 10, 5 second hold  [x] Diaphragmatic breathing during manual therapy for improved down training and proprioception/awareness  [x] Hip bridge with Kegel/exhale and adductor squeeze 3 x 10 ~cues to fully lengthen pelvic floor between repetitions  [] Side lying clamshells Kegel with abdominal isometric into small ball with exhale 2 x 10  [x] Abdominal isometrics with exhale 2 x 10, 5 seconds   [x] Pelvic floor quick flicks followed by pelvic drops 2 x 10    Nicole participated in dynamic functional therapeutic activities to improve functional performance for 13  minutes, including:    [] Reviewed roll for control with improved urgency   [] Sit to stand with Kegel and exhale on the ascent x 10   [x] Education on updated HEP incorporating Kegel's into her daily routine (in supine and with sit to stands); patient provided with additional HEP with changed verbal cues to address difficulty with motor coordination  [x] Education on bladder anatomy and how the detrusor muscles coordinate with pelvic floor. Additional education on urethral compressor musculature necessary for continence    Home Exercises Provided and Patient Education Provided     Education provided:   - bladder irritants, anatomy/physiology of pelvic floor, posture/body mechanices, bladder retraining, diaphragmatic breathing, isometric abdominal exercises, kegels, proper bearing down techniques, fluid intake/dietary modifications, and behavior modifications   Discussed progression of plan of care with patient; educated pt in activity modification; reviewed HEP with pt. Pt demonstrated and verbalized understanding of all instruction and was provided with a handout of HEP (see Patient  Instructions).     Written Home Exercises and Education Provided: yes. Exercises were reviewed and Nicole was able to demonstrate them prior to the end of the session.  Nicole demonstrated good  understanding of the education provided. See EMR under Patient Instructions for exercises and education provided during therapy sessions.    See EMR under Patient Instructions for exercises provided 10/27/2023.    Assessment     Nicole tolerated today's visit well with significant motor coordination deficits continued. Patient able to exhibit improved pelvic/core control with several verbal and tactile cue changes. Patient able to lengthen pelvic floor with diaphragmatic breathing today. However, after a break from this breathing, she returned to squeezing her pelvic floor musculature with deep breathing and had to be re-instructed on appropriate mechanics. Tactile overpressure onto diaphragm greatly improved abdominal filling/diaphragm dropping with inhale. Encouraged patient to discuss estrogen cream use with her doctor to clarify any uncertainty. Pt will continue to benefit from skilled outpatient physical therapy to address the deficits listed in the problem list box on initial evaluation, provide pt/family education and to maximize pt's level of independence in the home and community environment.     Nicole Is progressing well towards her goals.   Pt prognosis is Good.     Pt will continue to benefit from skilled outpatient physical therapy to address the deficits listed in the problem list box on initial evaluation, provide pt/family education and to maximize pt's level of independence in the home and community environment.     Pt's spiritual, cultural and educational needs considered and pt agreeable to plan of care and goals.     Anticipated barriers to physical therapy: none    Goals:  (PROGRESSING 11/09/2023)  Short Term Goals: 4 weeks      Pt to be able to perform a 10 second kegel x 5 reps to demonstrate  improving strength and endurance needed for continence.  Patient to demonstrate proper use of urge delay strategies to help reduce urge urinary incontinence with activities of daily living.   Pt to report a decrease in pad usage to no more than 2-3 a day to demonstrate improving pelvic floor function needed for continence.  Pt to be able to delay the urge to urinate at least 5 minutes with a strong urge to urinate in order to make it to the bathroom without leaking.  Pt to demonstrate proper diaphragmatic breathing to help with calming the nervous system in order to decrease pain and to improve abdominal wall musculature extensibility.      Long Term Goals: 12 weeks   Pt to be discharged with home plan for carry over after discharge.    Pt to be able to perform a 10 second kegel x 10 reps to demonstrate improving strength and endurance needed for continence.  Pt to report a decrease in pad usage to </= 1 pads a day to demonstrate improving pelvic floor muscle controls as evidenced by decreased episodes of incontinence needed to improve confidence in social situations.   Pt to demonstrate an improved score in the FOTO Urinary Problem survey  to at least 54 to demonstrate improving pelvic floor function aiding in continence during activities of daily living.    Pt to increase pelvic floor strength to at least 4/5 to demonstrate improved strength needed for continence with ADLs.   Pt to report a decrease in nocturia to no more than 1-2x/night for improved restorative sleep.      Plan     Plan of care Certification: 10/20/2023 to 01/19/2024.     Continue current plan of care with emphasis of strengthening periurethral musculature.     Next visit: incorporate strength and coordination training of pelvic/hip musculature with emphasis on breath control    Tomasa Montes, PT , DPT  11/09/2023

## 2023-11-18 DIAGNOSIS — F41.1 GAD (GENERALIZED ANXIETY DISORDER): ICD-10-CM

## 2023-11-18 DIAGNOSIS — F43.21 ADJUSTMENT DISORDER WITH DEPRESSED MOOD: ICD-10-CM

## 2023-11-18 DIAGNOSIS — N95.1 MENOPAUSAL SYMPTOMS: ICD-10-CM

## 2023-11-18 DIAGNOSIS — I99.8 WHITE MATTER DISEASE OF BRAIN DUE TO ISCHEMIA: ICD-10-CM

## 2023-11-18 DIAGNOSIS — R90.82 WHITE MATTER DISEASE OF BRAIN DUE TO ISCHEMIA: ICD-10-CM

## 2023-11-18 RX ORDER — ESCITALOPRAM OXALATE 20 MG/1
TABLET ORAL
Qty: 90 TABLET | Refills: 1 | Status: SHIPPED | OUTPATIENT
Start: 2023-11-18

## 2023-11-18 NOTE — TELEPHONE ENCOUNTER
Refill Decision Note   Nicole Websterain  is requesting a refill authorization.  Brief Assessment and Rationale for Refill:  Approve     Medication Therapy Plan:         Comments:     Note composed:9:49 AM 11/18/2023

## 2023-11-18 NOTE — TELEPHONE ENCOUNTER
No care due was identified.  [facility] Embedded Care Due Messages. Reference number: [id number].   11/18/2023 5:03:22 AM CST

## 2023-11-19 RX ORDER — PRAVASTATIN SODIUM 10 MG/1
TABLET ORAL
Qty: 100 TABLET | Refills: 1 | Status: SHIPPED | OUTPATIENT
Start: 2023-11-19

## 2023-11-19 NOTE — TELEPHONE ENCOUNTER
Refill Routing Note   Medication(s) are not appropriate for processing by [facility] for the following reason(s):        Required labs outdated    [facility] action(s):  Defer               Appointments  past 12m or future 3m with PCP    Date Provider   Last Visit   5/26/2023 Zara Sorensen MD   Next Visit   12/1/2023 Zara Sorensen MD   ED visits in past 90 days: 0        Note composed:3:48 AM 11/19/2023

## 2023-11-19 NOTE — TELEPHONE ENCOUNTER
No care due was identified.  [facility] Embedded Care Due Messages. Reference number: [id number].   11/18/2023 9:21:02 PM CST

## 2023-11-28 ENCOUNTER — TELEPHONE (OUTPATIENT)
Dept: OBSTETRICS AND GYNECOLOGY | Facility: CLINIC | Age: 73
End: 2023-11-28
Payer: MEDICARE

## 2023-12-01 ENCOUNTER — LAB VISIT (OUTPATIENT)
Dept: LAB | Facility: HOSPITAL | Age: 73
End: 2023-12-01
Attending: INTERNAL MEDICINE
Payer: MEDICARE

## 2023-12-01 ENCOUNTER — OFFICE VISIT (OUTPATIENT)
Dept: PRIMARY CARE CLINIC | Facility: CLINIC | Age: 73
End: 2023-12-01
Payer: MEDICARE

## 2023-12-01 VITALS
HEART RATE: 65 BPM | TEMPERATURE: 98 F | OXYGEN SATURATION: 97 % | SYSTOLIC BLOOD PRESSURE: 106 MMHG | BODY MASS INDEX: 24.97 KG/M2 | WEIGHT: 149.88 LBS | HEIGHT: 65 IN | DIASTOLIC BLOOD PRESSURE: 60 MMHG

## 2023-12-01 DIAGNOSIS — F41.1 GAD (GENERALIZED ANXIETY DISORDER): ICD-10-CM

## 2023-12-01 DIAGNOSIS — G31.84 MCI (MILD COGNITIVE IMPAIRMENT) WITH MEMORY LOSS: ICD-10-CM

## 2023-12-01 DIAGNOSIS — R73.03 PRE-DIABETES: ICD-10-CM

## 2023-12-01 DIAGNOSIS — I10 ESSENTIAL HYPERTENSION: ICD-10-CM

## 2023-12-01 DIAGNOSIS — I10 ESSENTIAL HYPERTENSION: Primary | ICD-10-CM

## 2023-12-01 DIAGNOSIS — Z12.31 ENCOUNTER FOR SCREENING MAMMOGRAM FOR BREAST CANCER: ICD-10-CM

## 2023-12-01 LAB
ALBUMIN SERPL BCP-MCNC: 4 G/DL (ref 3.5–5.2)
ALP SERPL-CCNC: 51 U/L (ref 55–135)
ALT SERPL W/O P-5'-P-CCNC: 32 U/L (ref 10–44)
ANION GAP SERPL CALC-SCNC: 7 MMOL/L (ref 8–16)
AST SERPL-CCNC: 37 U/L (ref 10–40)
BILIRUB SERPL-MCNC: 0.5 MG/DL (ref 0.1–1)
BUN SERPL-MCNC: 29 MG/DL (ref 8–23)
CALCIUM SERPL-MCNC: 9.8 MG/DL (ref 8.7–10.5)
CHLORIDE SERPL-SCNC: 103 MMOL/L (ref 95–110)
CHOLEST SERPL-MCNC: 171 MG/DL (ref 120–199)
CHOLEST/HDLC SERPL: 2.6 {RATIO} (ref 2–5)
CO2 SERPL-SCNC: 29 MMOL/L (ref 23–29)
CREAT SERPL-MCNC: 1.1 MG/DL (ref 0.5–1.4)
EST. GFR  (NO RACE VARIABLE): 52.7 ML/MIN/1.73 M^2
ESTIMATED AVG GLUCOSE: 120 MG/DL (ref 68–131)
GLUCOSE SERPL-MCNC: 99 MG/DL (ref 70–110)
HBA1C MFR BLD: 5.8 % (ref 4–5.6)
HDLC SERPL-MCNC: 66 MG/DL (ref 40–75)
HDLC SERPL: 38.6 % (ref 20–50)
LDLC SERPL CALC-MCNC: 94.6 MG/DL (ref 63–159)
NONHDLC SERPL-MCNC: 105 MG/DL
POTASSIUM SERPL-SCNC: 4.1 MMOL/L (ref 3.5–5.1)
PROT SERPL-MCNC: 8.8 G/DL (ref 6–8.4)
SODIUM SERPL-SCNC: 139 MMOL/L (ref 136–145)
TRIGL SERPL-MCNC: 52 MG/DL (ref 30–150)

## 2023-12-01 PROCEDURE — 36415 COLL VENOUS BLD VENIPUNCTURE: CPT | Mod: PN | Performed by: INTERNAL MEDICINE

## 2023-12-01 PROCEDURE — 99214 OFFICE O/P EST MOD 30 MIN: CPT | Mod: S$GLB,,, | Performed by: INTERNAL MEDICINE

## 2023-12-01 PROCEDURE — 1160F RVW MEDS BY RX/DR IN RCRD: CPT | Mod: CPTII,S$GLB,, | Performed by: INTERNAL MEDICINE

## 2023-12-01 PROCEDURE — 99214 PR OFFICE/OUTPT VISIT, EST, LEVL IV, 30-39 MIN: ICD-10-PCS | Mod: S$GLB,,, | Performed by: INTERNAL MEDICINE

## 2023-12-01 PROCEDURE — 3288F PR FALLS RISK ASSESSMENT DOCUMENTED: ICD-10-PCS | Mod: CPTII,S$GLB,, | Performed by: INTERNAL MEDICINE

## 2023-12-01 PROCEDURE — 80053 COMPREHEN METABOLIC PANEL: CPT | Performed by: INTERNAL MEDICINE

## 2023-12-01 PROCEDURE — 80061 LIPID PANEL: CPT | Performed by: INTERNAL MEDICINE

## 2023-12-01 PROCEDURE — 3074F PR MOST RECENT SYSTOLIC BLOOD PRESSURE < 130 MM HG: ICD-10-PCS | Mod: CPTII,S$GLB,, | Performed by: INTERNAL MEDICINE

## 2023-12-01 PROCEDURE — 3074F SYST BP LT 130 MM HG: CPT | Mod: CPTII,S$GLB,, | Performed by: INTERNAL MEDICINE

## 2023-12-01 PROCEDURE — 3008F BODY MASS INDEX DOCD: CPT | Mod: CPTII,S$GLB,, | Performed by: INTERNAL MEDICINE

## 2023-12-01 PROCEDURE — 1126F AMNT PAIN NOTED NONE PRSNT: CPT | Mod: CPTII,S$GLB,, | Performed by: INTERNAL MEDICINE

## 2023-12-01 PROCEDURE — 3288F FALL RISK ASSESSMENT DOCD: CPT | Mod: CPTII,S$GLB,, | Performed by: INTERNAL MEDICINE

## 2023-12-01 PROCEDURE — 4010F PR ACE/ARB THEARPY RXD/TAKEN: ICD-10-PCS | Mod: CPTII,S$GLB,, | Performed by: INTERNAL MEDICINE

## 2023-12-01 PROCEDURE — 99999 PR PBB SHADOW E&M-EST. PATIENT-LVL V: CPT | Mod: PBBFAC,,, | Performed by: INTERNAL MEDICINE

## 2023-12-01 PROCEDURE — 3078F DIAST BP <80 MM HG: CPT | Mod: CPTII,S$GLB,, | Performed by: INTERNAL MEDICINE

## 2023-12-01 PROCEDURE — 1160F PR REVIEW ALL MEDS BY PRESCRIBER/CLIN PHARMACIST DOCUMENTED: ICD-10-PCS | Mod: CPTII,S$GLB,, | Performed by: INTERNAL MEDICINE

## 2023-12-01 PROCEDURE — 1101F PT FALLS ASSESS-DOCD LE1/YR: CPT | Mod: CPTII,S$GLB,, | Performed by: INTERNAL MEDICINE

## 2023-12-01 PROCEDURE — 1159F MED LIST DOCD IN RCRD: CPT | Mod: CPTII,S$GLB,, | Performed by: INTERNAL MEDICINE

## 2023-12-01 PROCEDURE — 3078F PR MOST RECENT DIASTOLIC BLOOD PRESSURE < 80 MM HG: ICD-10-PCS | Mod: CPTII,S$GLB,, | Performed by: INTERNAL MEDICINE

## 2023-12-01 PROCEDURE — 3008F PR BODY MASS INDEX (BMI) DOCUMENTED: ICD-10-PCS | Mod: CPTII,S$GLB,, | Performed by: INTERNAL MEDICINE

## 2023-12-01 PROCEDURE — 1126F PR PAIN SEVERITY QUANTIFIED, NO PAIN PRESENT: ICD-10-PCS | Mod: CPTII,S$GLB,, | Performed by: INTERNAL MEDICINE

## 2023-12-01 PROCEDURE — 4010F ACE/ARB THERAPY RXD/TAKEN: CPT | Mod: CPTII,S$GLB,, | Performed by: INTERNAL MEDICINE

## 2023-12-01 PROCEDURE — 1101F PR PT FALLS ASSESS DOC 0-1 FALLS W/OUT INJ PAST YR: ICD-10-PCS | Mod: CPTII,S$GLB,, | Performed by: INTERNAL MEDICINE

## 2023-12-01 PROCEDURE — 1159F PR MEDICATION LIST DOCUMENTED IN MEDICAL RECORD: ICD-10-PCS | Mod: CPTII,S$GLB,, | Performed by: INTERNAL MEDICINE

## 2023-12-01 PROCEDURE — 99999 PR PBB SHADOW E&M-EST. PATIENT-LVL V: ICD-10-PCS | Mod: PBBFAC,,, | Performed by: INTERNAL MEDICINE

## 2023-12-01 PROCEDURE — 83036 HEMOGLOBIN GLYCOSYLATED A1C: CPT | Performed by: INTERNAL MEDICINE

## 2023-12-01 RX ORDER — ALPRAZOLAM 2 MG/1
2 TABLET ORAL 2 TIMES DAILY
Qty: 60 TABLET | Refills: 5 | Status: SHIPPED | OUTPATIENT
Start: 2023-12-01

## 2023-12-01 NOTE — PROGRESS NOTES
Subjective     Patient ID: Nicole Jacques is a 73 y.o. female.    Chief Complaint: Hypertension    Last seen 6 months ago. Returns for scheduled f/u chronic  medical conditions. Taking daily meds as prescribed, hypertension is consistently controlled. Seen by UroGynecology for incontinence, currently in pelvic floor therapy.     PMH: , misc x 1.    Hypertension.  Pre-Diabetes, HbA1c up to 6.3%, 5.6% Oct. '22, LDL 88.  GERD, EGD  only showed hiatal hernia.  Depression with Anxiety.    Lumbar Spondylosis.  Mild OA Knees.   Sigmoid Diverticulosis.   Microvascular White Matter disease.     Colonoscopy 3/21 normal, repeat 10 yrs. Mammogram normal . Pelvic exam . BMD normal 10/23. Eye exam . Vaccines reviewed, up to date.     SOCIAL: No tobacco,  smokes. Social alcohol.  with 2 children. 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     PSH: BTL, Hysterectomy due to heavy menses, ovaries remain. Left knee arthroscopic surgery due to injury kick boxing. Appendectomy. Bilateral Cataract extractions and Blepharoplasty.     FMH: Father  age 81 with bladder cancer and dementia. Mother  age 78 with hypertension and renal failure, and CHF, possible osteoporosis. Diabetes in grandmothers. Anxiety - mother, sister. Colon cancer in maternal uncle.    Allergies: Bactrim.    Medications: list reviewed and reconciled. Still takes daily OTC Iron tab. Meloxicam only once or twice a week.             Review of Systems   Constitutional:  Negative for fatigue and fever.   HENT:  Negative for nasal congestion, rhinorrhea and trouble swallowing.    Eyes:  Negative for visual disturbance.   Respiratory:  Negative for cough and shortness of breath.    Cardiovascular:  Negative for chest pain, palpitations and leg swelling.   Gastrointestinal:  Negative for abdominal pain, diarrhea, nausea and vomiting.   Genitourinary:  Negative for dysuria and hematuria.   Musculoskeletal:  Negative for  arthralgias and myalgias.   Integumentary:  Negative for rash and wound.   Neurological:  Positive for tremors and memory loss. Negative for dizziness, seizures, syncope, facial asymmetry, speech difficulty, weakness, headaches and coordination difficulties.        Requests re-evaluation for memory impairment, needs new referral, previous provider has retired.   Psychiatric/Behavioral:  Negative for dysphoric mood. The patient is nervous/anxious.         She feels stable on current dose of Lexapro.           Objective     Physical Exam  Vitals reviewed.   Constitutional:       General: She is not in acute distress.     Appearance: She is well-developed. She is not diaphoretic.   HENT:      Head: Normocephalic and atraumatic.      Right Ear: Tympanic membrane and ear canal normal.      Left Ear: Tympanic membrane and ear canal normal.      Nose: Nose normal. No congestion.      Mouth/Throat:      Mouth: Mucous membranes are moist.      Pharynx: Oropharynx is clear.   Eyes:      General: No scleral icterus.     Extraocular Movements: Extraocular movements intact.      Conjunctiva/sclera: Conjunctivae normal.      Right eye: Right conjunctiva is not injected.      Left eye: Left conjunctiva is not injected.      Pupils: Pupils are equal, round, and reactive to light.   Neck:      Thyroid: No thyromegaly.      Vascular: No carotid bruit or JVD.   Cardiovascular:      Rate and Rhythm: Normal rate and regular rhythm.      Pulses: Normal pulses.      Heart sounds: Normal heart sounds. No murmur heard.     No friction rub. No gallop.   Pulmonary:      Effort: Pulmonary effort is normal. No respiratory distress.      Breath sounds: Normal breath sounds. No wheezing, rhonchi or rales.   Abdominal:      General: Bowel sounds are normal. There is no distension.      Palpations: Abdomen is soft. There is no mass.      Tenderness: There is no abdominal tenderness.   Musculoskeletal:         General: No tenderness or deformity.  Normal range of motion.      Cervical back: Normal range of motion and neck supple.      Right lower leg: No edema.      Left lower leg: No edema.   Lymphadenopathy:      Cervical: No cervical adenopathy.   Skin:     General: Skin is warm and dry.      Coloration: Skin is not pale.      Findings: No erythema or rash.      Nails: There is no clubbing.   Neurological:      General: No focal deficit present.      Mental Status: She is alert and oriented to person, place, and time.      Cranial Nerves: No cranial nerve deficit or facial asymmetry.      Motor: Tremor present. No weakness or abnormal muscle tone.      Coordination: Coordination normal.      Gait: Gait normal.   Psychiatric:         Mood and Affect: Mood and affect normal.         Speech: Speech normal.         Behavior: Behavior normal.         Thought Content: Thought content normal.         Judgment: Judgment normal.   Labs 5/23: CMP with new mild elevation of TProt 8.6, and new mild decrease in GFR 52.7. Anemia resolved.     Assessment and Plan     1. Essential hypertension controlled  -     Comprehensive Metabolic Panel; Future; Expected date: 12/01/2023  -     Lipid Panel; Future; Expected date: 12/01/2023  -     continue Amlodipine, Losartan, Pravastatin- refills available.     2. Pre-diabetes  -     Hemoglobin A1C; Future; Expected date: 12/01/2023    3. MCI (mild cognitive impairment) with memory loss  -     Ambulatory referral/consult to Neurology; Future; Expected date: 12/08/2023    4. Encounter for screening mammogram for breast cancer        -     Mammogram is ordered and scheduled later this month.     5. ROSALIA (generalized anxiety disorder)  -     ALPRAZolam (XANAX) 2 MG Tab; Take 1 tablet (2 mg total) by mouth 2 (two) times daily. as needed for anxiety.  Dispense: 60 tablet; Refill: 5       Follow up in about 6 months (around 6/1/2024).

## 2023-12-03 ENCOUNTER — PATIENT MESSAGE (OUTPATIENT)
Dept: PRIMARY CARE CLINIC | Facility: CLINIC | Age: 73
End: 2023-12-03
Payer: MEDICARE

## 2023-12-03 DIAGNOSIS — R77.8 ELEVATED TOTAL PROTEIN: Primary | ICD-10-CM

## 2023-12-04 ENCOUNTER — TELEPHONE (OUTPATIENT)
Dept: NEUROLOGY | Facility: CLINIC | Age: 73
End: 2023-12-04
Payer: MEDICARE

## 2023-12-04 NOTE — TELEPHONE ENCOUNTER
----- Message from Judy Thibodeaux sent at 12/4/2023 12:11 PM CST -----  Pt has a referral for MCI (mild cognitive impairment) with memory loss. No appts in [facility]. Pt can be reached at  [phone].      Thank you

## 2023-12-06 ENCOUNTER — CLINICAL SUPPORT (OUTPATIENT)
Dept: REHABILITATION | Facility: HOSPITAL | Age: 73
End: 2023-12-06
Payer: MEDICARE

## 2023-12-06 DIAGNOSIS — N39.41 URGE URINARY INCONTINENCE: ICD-10-CM

## 2023-12-06 DIAGNOSIS — M62.89 PELVIC FLOOR DYSFUNCTION: Primary | ICD-10-CM

## 2023-12-06 PROCEDURE — 97112 NEUROMUSCULAR REEDUCATION: CPT

## 2023-12-06 PROCEDURE — 97530 THERAPEUTIC ACTIVITIES: CPT

## 2023-12-06 NOTE — PROGRESS NOTES
Pelvic Health Physical Therapy   Progress/ Treatment Note     Name: Nicole Jacques  Clinic Number: [id number]    Therapy Diagnosis:   Encounter Diagnoses   Name Primary?    Pelvic floor dysfunction Yes    Urge urinary incontinence        Physician: Dinorah Szymanski NP    Visit Date: 12/6/2023    Physician Orders: PT Eval and Treat   Medical Diagnosis from Referral: Urge incontinence [N39.41]  Evaluation Date: 10/20/2023  Authorization Period Expiration: 12/28/2023  Plan of Care Expiration: 01/19/2024  Progress Note Due: 11/20/2023  Visit # / Visits authorized: 4/ 24   FOTO: 2/3  Cancelled Visits: 2  No Show Visits: 0    Time In: 10:54 am  Time Out: 11:54 am  Total Billable Time: 60 minutes    Precautions: Standard    Subjective     Pt reports: she went to the doctor on the 1st and the report was not great. Her cholesterol is controlled, she is pre-diabetic, persistent mild impaired kidney function. She has been trying to drink more water. She has been running to the bathroom more because of the increased water intake. Some of the exercises have helped with the urgency. Her leakage at night is not as bad. She does still have some leakage. She is drinking water at night with her medications. It is still not as bad as it was before. She did not get to go back to tennis. She did talk to her doctor about estrogen cream use and vitamin E. Denies any pain with vaginal exams or intercourse recently. She does not have to leave yoga due to the urge to go.     She was compliant with home exercise program.  Response to previous treatment: no adverse reaction  Functional change: improving urgency     Pain: 0/10  Location:  not applicable      Objective     VAGINAL PELVIC FLOOR EXAM      INTERNAL ASSESSMENT  Pain: none   Sensation: able to localized pressure appropriately   Vaginal vault: WNL   Muscle Bulk: WFL   Muscle Power: 3/5  Muscle Endurance: 10 sec  # Reps To Fatigue: 5    Quality of contraction: WNL   Specificity:  WNL   Coordination: WNL   Comments: stool palpable in posterior vaginal vault      Nicole received therapeutic exercises to develop  strength, endurance, ROM, flexibility, posture, and core stabilization for 0 minutes including:       Nicole received the following manual therapy techniques: to develop flexibility, extensibility, and desensitization for 8 minutes including:     [] Trigger point/myofascial release of bilateral pelvic floor layers 2 & 3  [x] Periurethral and bladder neck fascial release ~brief due to large improvements  [x] Tactile cueing for endurance holds       Nicole participated in neuromuscular re-education activities to develop Coordination, Control, Down training, Posture, Proprioception, Kinesthetic, Balance, and Sense for 42 minutes including:    [x] Kegel's in supine 10 x 10 second hold (rest breaks as needed)  [x] Diaphragmatic breathing during manual therapy for improved down training and proprioception/awareness  [x] Hip bridge with Kegel/exhale and abduction, green theraband 2 x 10   [x] Side lying clamshells Kegel with abdominal isometric + Kegel/exhale, green theraband  2 x 10  [x] Abdominal isometrics with exhale 2 x 10, 10 seconds   [x] Side lying hip abductions x fatigue with emphasis on eccentric control and breath management      Nicole participated in dynamic functional therapeutic activities to improve functional performance for 10  minutes, including:    [x] Reviewed roll for control with improved urgency   [x] Reviewed breathing for optimization of intra-abdominal pressure and pelvic activation/relaxation during activity classes  [] Sit to stand with Kegel and exhale on the ascent x 10   [x] Updated/reviewed HEP as demonstrated in wrap up section of note  [] Education on bladder anatomy and how the detrusor muscles coordinate with pelvic floor. Additional education on urethral compressor musculature necessary for continence    Home Exercises Provided and Patient Education  Provided     Education provided:   - bladder irritants, anatomy/physiology of pelvic floor, posture/body mechanices, bladder retraining, diaphragmatic breathing, isometric abdominal exercises, kegels, proper bearing down techniques, fluid intake/dietary modifications, and behavior modifications   Discussed progression of plan of care with patient; educated pt in activity modification; reviewed HEP with pt. Pt demonstrated and verbalized understanding of all instruction and was provided with a handout of HEP (see Patient Instructions).     Written Home Exercises and Education Provided: yes. Exercises were reviewed and Nicole was able to demonstrate them prior to the end of the session.  Nicole demonstrated good  understanding of the education provided. See EMR under Patient Instructions for exercises and education provided during therapy sessions.    See EMR under Patient Instructions for exercises provided 10/27/2023.    Assessment     Nicole tolerated today's assessment extremely well with significant improvements in motor coordination, pelvic strength/endurance, and lengthening of pelvic floor. Patient is able to contract pelvic floor for 10 seconds x 5 prior to onset of fatigue which is a large improvement. Hip strengthening with incorporation of pelvic floor and breath management provided today to optimize pelvic floor function and control. Patient  instructed on updated home exercise program with focus on pelvic/hip strengthening. She was also encouraged to continue prior activities provided.  Pt will continue to benefit from skilled outpatient physical therapy to address the deficits listed in the problem list box on initial evaluation, provide pt/family education and to maximize pt's level of independence in the home and community environment. Discussed discharge following expiration of current plan of care.     Nicole Is progressing well towards her goals.   Pt prognosis is Good.     Pt will continue to  benefit from skilled outpatient physical therapy to address the deficits listed in the problem list box on initial evaluation, provide pt/family education and to maximize pt's level of independence in the home and community environment.     Pt's spiritual, cultural and educational needs considered and pt agreeable to plan of care and goals.     Anticipated barriers to physical therapy: none    Goals:    Short Term Goals: 4 weeks      Pt to be able to perform a 10 second kegel x 5 reps to demonstrate improving strength and endurance needed for continence. ( MET 12/06/2023)  Patient to demonstrate proper use of urge delay strategies to help reduce urge urinary incontinence with activities of daily living.  ( MET 12/06/2023)  Pt to report a decrease in pad usage to no more than 2-3 a day to demonstrate improving pelvic floor function needed for continence.  ( MET 12/06/2023) 3  Pt to be able to delay the urge to urinate at least 5 minutes with a strong urge to urinate in order to make it to the bathroom without leaking.  ( MET 12/06/2023)  Pt to demonstrate proper diaphragmatic breathing to help with calming the nervous system in order to decrease pain and to improve abdominal wall musculature extensibility.  ( MET 12/06/2023)     Long Term Goals: 12 weeks   Pt to be discharged with home plan for carry over after discharge.    ( NOT MET 12/06/2023)  Pt to be able to perform a 10 second kegel x 10 reps to demonstrate improving strength and endurance needed for continence. ( PART MET 12/06/2023) 5 X 10 sec  Pt to report a decrease in pad usage to </= 1 pads a day to demonstrate improving pelvic floor muscle controls as evidenced by decreased episodes of incontinence needed to improve confidence in social situations.   ( PART MET 12/06/2023)  Pt to demonstrate an improved score in the FOTO Urinary Problem survey  to at least 54 to demonstrate improving pelvic floor function aiding in continence during activities of daily  living.   ( PART MET 12/06/2023)  Pt to increase pelvic floor strength to at least 4/5 to demonstrate improved strength needed for continence with ADLs.  ( PART MET 12/06/2023)  Pt to report a decrease in nocturia to no more than 1-2x/night for improved restorative sleep.   ( MET 12/06/2023)    Plan     Plan of care Certification: 10/20/2023 to 01/19/2024.     Continue current plan of care with emphasis of strengthening periurethral musculature.     Next visit: incorporate/progress strength and coordination training of pelvic/hip musculature with emphasis on breath control    Tomasa Montes, PT , DPT  12/06/2023

## 2023-12-06 NOTE — PATIENT INSTRUCTIONS
"Abdominal Bracing with Pelvic Floor Muscle Contraction    Perform lying down with knees bent     Start by taking a deep breath in, then as you exhale (think about gently blowing out birthday candles) draw your pelvic floor and abdomen in. You should feel a firming about your mid-section and in your pelvic floor. (Do not think about sucking in like you're fitting into a pair of skinny jeans).   Be sure to fully relax in between each repetition.     Hold for 10 seconds. Perform 2 sets of 10 repetitions. Perform 1-2x/day.          Bridges with band around knees  Start on your back with knees bent  Perform Kegel and blow out before lifting your hips off of mat, keep tension on band with knees apart  Squeeze your buttocks and then raise your buttocks off the floor/bed as creating a "Bridge" with your body. Hold for 5 seconds and then lower yourself and repeat.  Repeat 3 sets of 10         Clamshells (+ Kegel with the knee opening)  - Start laying on your side, feet together and knees bent.   - Keep your feet together as you rotate your hip to bring your knee up. Don't let your pelvis rock forward.   - Make sure to exhale/Kegel as your knee lifts away from ground  - Repeat 3 sets of 10 on each side.         Side-lying Leg Lifts     - Start lying on your side with your bottom knee bent and your top leg straight. Slightly roll your top hip forward.   - Bring your top leg back slightly so that your leg is in a straight line with your trunk.   - Keeping your knee straight, raise your leg up towards the ceiling, then slowly lower back down. You should feel your muscle on the side of your hip/glute kassie.     Perform 2-3 sets to fatigue on each leg - meaning that during each set, you keep doing leg lifts until you feel a significant muscle burn and you don't feel that you can't do any more with the proper form.    "

## 2023-12-13 ENCOUNTER — CLINICAL SUPPORT (OUTPATIENT)
Dept: REHABILITATION | Facility: HOSPITAL | Age: 73
End: 2023-12-13
Payer: MEDICARE

## 2023-12-13 DIAGNOSIS — M62.89 PELVIC FLOOR DYSFUNCTION: Primary | ICD-10-CM

## 2023-12-13 DIAGNOSIS — N39.41 URGE URINARY INCONTINENCE: ICD-10-CM

## 2023-12-13 PROCEDURE — 97530 THERAPEUTIC ACTIVITIES: CPT

## 2023-12-13 PROCEDURE — 97112 NEUROMUSCULAR REEDUCATION: CPT

## 2023-12-13 NOTE — PROGRESS NOTES
Pelvic Health Physical Therapy    Treatment Note     Name: Nicole Jacques  Clinic Number: [id number]    Therapy Diagnosis:   Encounter Diagnoses   Name Primary?    Pelvic floor dysfunction Yes    Urge urinary incontinence        Physician: Dinorah Szymanski NP    Visit Date: 12/13/2023    Physician Orders: PT Eval and Treat   Medical Diagnosis from Referral: Urge incontinence [N39.41]  Evaluation Date: 10/20/2023  Authorization Period Expiration: 12/28/2023  Plan of Care Expiration: 01/19/2024  Progress Note Due: 01/06/2023  Visit # / Visits authorized: 5/ 24   FOTO: 2/3  Cancelled Visits: 2  No Show Visits: 0    Time In: 10:51 am  Time Out: 11:50 am  Total Billable Time: 59 minutes    Precautions: Standard    Subjective     Pt reports: she is going to go play tennis by herself and start lifting weights to keep getting stronger. Leakage and the pelvic floor are doing better. She is not running to the bathroom as much. She had type 1 stool this morning on the bristol stool chart and thinks it is due to lack of fiber. She has been drinking a lot of water. Yoga is going great and she is feeling better. She has not had any incontinence recently which is great.     She was compliant with home exercise program.  Response to previous treatment: no adverse reaction  Functional change: improving urgency     Pain: 0/10  Location:  not applicable      Objective       Nicole received therapeutic exercises to develop  strength, endurance, ROM, flexibility, posture, and core stabilization for 0 minutes including:       Nicole received the following manual therapy techniques: to develop flexibility, extensibility, and desensitization for 0 minutes including:     [] Trigger point/myofascial release of bilateral pelvic floor layers 2 & 3  [] Periurethral and bladder neck fascial release ~brief due to large improvements  [] Tactile cueing for endurance holds       Nicole participated in neuromuscular re-education activities to  develop Coordination, Control, Down training, Posture, Proprioception, Kinesthetic, Balance, and Sense for 49 minutes including:    (Includes time for rest/water breaks)    [x] Kegel's in child's pose 10 x 10 second hold   [x] Single knee to chest 2 x 10 diaphragmatic breaths each side  [x] Diaphragmatic breathing in child's pose x 30 breaths  [x] Hip bridge with Kegel/exhale and abduction, blue theraband 3 x 10   [x] Side lying clamshells Kegel with abdominal isometric + Kegel/exhale, green theraband  2 x 10  [] Standing abdominal isometrics with exhale 2 x 10, 10 seconds   [x] Side lying hip abductions x fatigue with emphasis on eccentric control and breath management      Nicole participated in dynamic functional therapeutic activities to improve functional performance for 10  minutes, including:    [] Reviewed roll for control with improved urgency   [x] Reviewed breathing for optimization of intra-abdominal pressure and pelvic activation/relaxation during activity classes  [x] Sit to stand with Kegel and exhale on the ascent,  x 25 with breaks as needed  [] Updated/reviewed HEP as demonstrated in wrap up section of note  [] Education on bladder anatomy and how the detrusor muscles coordinate with pelvic floor. Additional education on urethral compressor musculature necessary for continence    Home Exercises Provided and Patient Education Provided     Education provided:   - bladder irritants, anatomy/physiology of pelvic floor, posture/body mechanices, bladder retraining, diaphragmatic breathing, isometric abdominal exercises, kegels, proper bearing down techniques, fluid intake/dietary modifications, and behavior modifications   Discussed progression of plan of care with patient; educated pt in activity modification; reviewed HEP with pt. Pt demonstrated and verbalized understanding of all instruction and was provided with a handout of HEP (see Patient Instructions).     Written Home Exercises and Education  Provided: yes. Exercises were reviewed and Nicole was able to demonstrate them prior to the end of the session.  Nicole demonstrated good  understanding of the education provided. See EMR under Patient Instructions for exercises and education provided during therapy sessions.    See EMR under Patient Instructions for exercises provided 10/27/2023.    Assessment     Nicole tolerated today's visit well with increase in tremors following interventions secondary to increased exertion. Following rest breaks, there was a notable decrease in the intensity of her tremors. Some hip fatigue noted following today's visit as expected. Breath management emphasized during varying activities and functional tasks today with good understanding in comparison to prior visits.  Pt will continue to benefit from skilled outpatient physical therapy to address the deficits listed in the problem list box on initial evaluation, provide pt/family education and to maximize pt's level of independence in the home and community environment. Discussed discharge following expiration of current plan of care.     Nicole Is progressing well towards her goals.   Pt prognosis is Good.     Pt will continue to benefit from skilled outpatient physical therapy to address the deficits listed in the problem list box on initial evaluation, provide pt/family education and to maximize pt's level of independence in the home and community environment.     Pt's spiritual, cultural and educational needs considered and pt agreeable to plan of care and goals.     Anticipated barriers to physical therapy: none    Goals:  (PROGRESSING 12/13/2023)  Short Term Goals: 4 weeks      Pt to be able to perform a 10 second kegel x 5 reps to demonstrate improving strength and endurance needed for continence. ( MET 12/06/2023)  Patient to demonstrate proper use of urge delay strategies to help reduce urge urinary incontinence with activities of daily living.  ( MET 12/06/2023)  Pt  to report a decrease in pad usage to no more than 2-3 a day to demonstrate improving pelvic floor function needed for continence.  ( MET 12/06/2023) 3  Pt to be able to delay the urge to urinate at least 5 minutes with a strong urge to urinate in order to make it to the bathroom without leaking.  ( MET 12/06/2023)  Pt to demonstrate proper diaphragmatic breathing to help with calming the nervous system in order to decrease pain and to improve abdominal wall musculature extensibility.  ( MET 12/06/2023)     Long Term Goals: 12 weeks   Pt to be discharged with home plan for carry over after discharge.    ( NOT MET 12/06/2023)  Pt to be able to perform a 10 second kegel x 10 reps to demonstrate improving strength and endurance needed for continence. ( PART MET 12/06/2023) 5 X 10 sec  Pt to report a decrease in pad usage to </= 1 pads a day to demonstrate improving pelvic floor muscle controls as evidenced by decreased episodes of incontinence needed to improve confidence in social situations.   ( PART MET 12/06/2023)  Pt to demonstrate an improved score in the FOTO Urinary Problem survey  to at least 54 to demonstrate improving pelvic floor function aiding in continence during activities of daily living.   ( PART MET 12/06/2023)  Pt to increase pelvic floor strength to at least 4/5 to demonstrate improved strength needed for continence with ADLs.  ( PART MET 12/06/2023)  Pt to report a decrease in nocturia to no more than 1-2x/night for improved restorative sleep.   ( MET 12/06/2023)    Plan     Plan of care Certification: 10/20/2023 to 01/19/2024.     Continue current plan of care with emphasis of strengthening periurethral musculature.     Next visit: incorporate/progress strength and coordination training of pelvic/hip musculature with emphasis on breath control    Tomasa Montes, PT , DPT  12/13/2023

## 2023-12-14 ENCOUNTER — TELEPHONE (OUTPATIENT)
Dept: NEUROLOGY | Facility: CLINIC | Age: 73
End: 2023-12-14
Payer: MEDICARE

## 2023-12-14 NOTE — TELEPHONE ENCOUNTER
----- Message from Judy Thibodeaux sent at 12/14/2023 12:51 PM CST -----  Pt has a referral for MCI (mild cognitive impairment) with memory loss. No appts in [facility]. Pt can be reached at [phone].      Thank you

## 2023-12-20 ENCOUNTER — CLINICAL SUPPORT (OUTPATIENT)
Dept: REHABILITATION | Facility: HOSPITAL | Age: 73
End: 2023-12-20
Payer: MEDICARE

## 2023-12-20 DIAGNOSIS — M62.89 PELVIC FLOOR DYSFUNCTION: Primary | ICD-10-CM

## 2023-12-20 DIAGNOSIS — N39.41 URGE URINARY INCONTINENCE: ICD-10-CM

## 2023-12-20 PROCEDURE — 97112 NEUROMUSCULAR REEDUCATION: CPT

## 2023-12-20 PROCEDURE — 97140 MANUAL THERAPY 1/> REGIONS: CPT

## 2023-12-20 PROCEDURE — 97530 THERAPEUTIC ACTIVITIES: CPT

## 2023-12-20 NOTE — PROGRESS NOTES
Pelvic Health Physical Therapy    Treatment Note     Name: Nicole Jacques  Clinic Number: [id number]    Therapy Diagnosis:   Encounter Diagnoses   Name Primary?    Pelvic floor dysfunction Yes    Urge urinary incontinence        Physician: Dinorah Szymanski NP    Visit Date: 12/20/2023    Physician Orders: PT Eval and Treat   Medical Diagnosis from Referral: Urge incontinence [N39.41]  Evaluation Date: 10/20/2023  Authorization Period Expiration: 12/28/2023  Plan of Care Expiration: 01/19/2024  Progress Note Due: 01/06/2023  Visit # / Visits authorized: 6/ 24   FOTO: 2/3 (provide with discharge on 12/27/23)  Cancelled Visits: 2  No Show Visits: 0    Time In: 10:57 am  Time Out: 10:50 am  Total Billable Time: 53 minutes    Precautions: Standard    Subjective     Pt reports: she is doing good with her home exercise programs. Urgency is still doing better. A very little bit of leakage but not what it used to be. She has been drinking a lot of water without any issue and has not been going to the bathroom much at night (0-1x/night). Her back flared up a little bit yesterday but is feeling better today.     She was compliant with home exercise program.  Response to previous treatment: no adverse reaction  Functional change: improving urgency     Pain: 0/10  Location:  not applicable      Objective     Pelvic assessment: 4/5 squeeze with 10 second endurance hold. Patient is able to confirm location of intra-vaginal pressure. Good coordination with pelvic lengthening during bearing down and with diaphragmatic breathing    Nicole received therapeutic exercises to develop  strength, endurance, ROM, flexibility, posture, and core stabilization for 0 minutes including:       Nicole received the following manual therapy techniques: to develop flexibility, extensibility, and desensitization for 8 minutes including:     [x] Manual assessment of pelvic activation/drop with tactile cueing for increased endurance hold followed  by pelvic drop  [] Trigger point/myofascial release of bilateral pelvic floor layers 2 & 3  [] Periurethral and bladder neck fascial release ~brief due to large improvements  [] Tactile cueing for endurance holds       Nicole participated in neuromuscular re-education activities to develop Coordination, Control, Down training, Posture, Proprioception, Kinesthetic, Balance, and Sense for 35 minutes including:    (Includes time for rest/water breaks)    [x] Supine adductor squeeze + Kegel 10 x 10 second hold  [x] Standing adductor squeeze + Kegel 5 x 10 second hold  [x] Kegel's in child's pose 10 x 10 second hold   [x] Single knee to chest 3 x 5 diaphragmatic breaths each side  [x] Diaphragmatic breathing in child's pose x 10 breaths  [x] Hip bridge with Kegel/exhale and abduction, blue theraband 3 x 10   [] Side lying hip abductions x fatigue with emphasis on eccentric control and breath management    [] Side lying clamshells Kegel with abdominal isometric + Kegel/exhale, green theraband  2 x 10  [] Standing abdominal isometrics with exhale 2 x 10, 10 seconds       Nicole participated in dynamic functional therapeutic activities to improve functional performance for 10  minutes, including:    [] Reviewed roll for control with improved urgency   [] Reviewed breathing for optimization of intra-abdominal pressure and pelvic activation/relaxation during activity classes  [x] Sit to stand + adductor squeeze + Kegel and exhale on the ascent,  x 20 with breaks as needed  [x] Standing marching for pelvic stabilization and improved balance negotiating obstacles 2 x 10  [] Updated/reviewed HEP as demonstrated in wrap up section of note  [] Education on bladder anatomy and how the detrusor muscles coordinate with pelvic floor. Additional education on urethral compressor musculature necessary for continence    Home Exercises Provided and Patient Education Provided     Education provided:   - bladder irritants, anatomy/physiology  of pelvic floor, posture/body mechanices, bladder retraining, diaphragmatic breathing, isometric abdominal exercises, kegels, proper bearing down techniques, fluid intake/dietary modifications, and behavior modifications   Discussed progression of plan of care with patient; educated pt in activity modification; reviewed HEP with pt. Pt demonstrated and verbalized understanding of all instruction and was provided with a handout of HEP (see Patient Instructions).     Written Home Exercises and Education Provided: yes. Exercises were reviewed and Nicole was able to demonstrate them prior to the end of the session.  Nicole demonstrated good  understanding of the education provided. See EMR under Patient Instructions for exercises and education provided during therapy sessions.    See EMR under Patient Instructions for exercises provided 10/27/2023.    Assessment     Nicole demonstrates significant improvements in pelvic tone, strength, coordination, and relaxation. Good execution of pelvic activation in supine and during hip bridges. Pressure management is additionally improved with better understanding of breathing during activities. Discussed discharge in following visit on 12/27/2023 with updated HEP to be provided for continued improvements in pelvic strength, coordination, hip strengthening, and urge control.     Nicole Is progressing well towards her goals.   Pt prognosis is Good.     Pt will continue to benefit from skilled outpatient physical therapy to address the deficits listed in the problem list box on initial evaluation, provide pt/family education and to maximize pt's level of independence in the home and community environment.     Pt's spiritual, cultural and educational needs considered and pt agreeable to plan of care and goals.     Anticipated barriers to physical therapy: none    Goals:  (PROGRESSING 12/20/2023)  Short Term Goals: 4 weeks      Pt to be able to perform a 10 second kegel x 5 reps to  demonstrate improving strength and endurance needed for continence. ( MET 12/06/2023)  Patient to demonstrate proper use of urge delay strategies to help reduce urge urinary incontinence with activities of daily living.  ( MET 12/06/2023)  Pt to report a decrease in pad usage to no more than 2-3 a day to demonstrate improving pelvic floor function needed for continence.  ( MET 12/06/2023) 3  Pt to be able to delay the urge to urinate at least 5 minutes with a strong urge to urinate in order to make it to the bathroom without leaking.  ( MET 12/06/2023)  Pt to demonstrate proper diaphragmatic breathing to help with calming the nervous system in order to decrease pain and to improve abdominal wall musculature extensibility.  ( MET 12/06/2023)     Long Term Goals: 12 weeks   Pt to be discharged with home plan for carry over after discharge.    ( NOT MET 12/06/2023)  Pt to be able to perform a 10 second kegel x 10 reps to demonstrate improving strength and endurance needed for continence. ( PART MET 12/06/2023) 5 X 10 sec  Pt to report a decrease in pad usage to </= 1 pads a day to demonstrate improving pelvic floor muscle controls as evidenced by decreased episodes of incontinence needed to improve confidence in social situations.   ( PART MET 12/06/2023)  Pt to demonstrate an improved score in the FOTO Urinary Problem survey  to at least 54 to demonstrate improving pelvic floor function aiding in continence during activities of daily living.   ( PART MET 12/06/2023)  Pt to increase pelvic floor strength to at least 4/5 to demonstrate improved strength needed for continence with ADLs.  ( PART MET 12/06/2023)  Pt to report a decrease in nocturia to no more than 1-2x/night for improved restorative sleep.   ( MET 12/06/2023)    Plan     Plan of care Certification: 10/20/2023 to 01/19/2024.     Continue current plan of care with emphasis of strengthening periurethral musculature.     Next visit: incorporate/progress  strength and coordination training of pelvic/hip musculature with emphasis on breath control. Discharge 12/27/23    Tomasa Montes, PT , DPT  12/20/2023

## 2023-12-26 NOTE — PROGRESS NOTES
Pelvic Health Physical Therapy    Discharge Summary     Name: Nicole Jacques  Clinic Number: [id number]    Therapy Diagnosis:   Encounter Diagnosis   Name Primary?    Pelvic floor dysfunction Yes       Physician: Dinorah Szymanski NP    Visit Date: 12/27/2023    Physician Orders: PT Eval and Treat   Medical Diagnosis from Referral: Urge incontinence [N39.41]  Evaluation Date: 10/20/2023  Authorization Period Expiration: 12/28/2023  Plan of Care Expiration: 01/19/2024  Progress Note Due: 01/06/2023  Visit # / Visits authorized: 7/ 24   FOTO: 3/3  Cancelled Visits: 2  No Show Visits: 0    Time In: 11:00 am  Time Out: 11:39 am  Total Billable Time: 39 minutes    Precautions: Standard    Subjective     Pt reports: things are good. She has been having a lot of appointments. Her back is a little achy but it is okay. She has improved nocturia and urgency.       She was compliant with home exercise program.  Response to previous treatment: no adverse reaction  Functional change: improving urgency     Pain: 0/10  Location:  not applicable      Objective     Pelvic assessment: 4/5 squeeze with 10 second endurance hold. Patient is able to confirm location of intra-vaginal pressure. Good coordination with pelvic lengthening during bearing down and with diaphragmatic breathing. Significantly improved coordination required for urinary continence.       Nicole participated in neuromuscular re-education activities to develop Coordination, Control, Down training, Posture, Proprioception, Kinesthetic, Balance, and Sense for 0 minutes including:      Nicole participated in dynamic functional therapeutic activities to improve functional performance for 39  minutes, including:    [x] Reviewed roll for control with improved urgency , urge suppression techniques and knack additionally reviewed  [x] Reviewed breathing for optimization of intra-abdominal pressure and pelvic activation/relaxation during activity classes  [] Sit to stand  + adductor squeeze + Kegel and exhale on the ascent,  x 20 with breaks as needed  [] Standing marching for pelvic stabilization and improved balance negotiating obstacles 2 x 10  [x] Reviewed and performed all HEP previously provided with cues to ensure she does not irritate her back and activates the appropriate musculature.   [x] Reviewed progress, prognosis, regular bladder schedule, fluid intake and bladder irritants    Home Exercises Provided and Patient Education Provided     Education provided:   - bladder irritants, anatomy/physiology of pelvic floor, posture/body mechanices, bladder retraining, diaphragmatic breathing, isometric abdominal exercises, kegels, proper bearing down techniques, fluid intake/dietary modifications, and behavior modifications   Discussed progression of plan of care with patient; educated pt in activity modification; reviewed HEP with pt. Pt demonstrated and verbalized understanding of all instruction and was provided with a handout of HEP (see Patient Instructions).     Written Home Exercises and Education Provided: yes. Exercises were reviewed and Nicole was able to demonstrate them prior to the end of the session.  Nicole demonstrated good  understanding of the education provided. See EMR under Patient Instructions for exercises and education provided during therapy sessions.    See EMR under Patient Instructions for exercises provided 10/27/2023.    PHYSICAL THERAPY DISCHARGE SUMMARY     Status Towards Goals Met: Nicole met all short and long term goals. She has demonstrated great progress in pelvic strength, endurance, pelvic floor function, urgency, and incontinence. She has done very well with all of the home exercises provided and has been able to incorporate them into yoga classes. Nocturia is significantly improved and has stayed consistent for several weeks now. She continues to wear one panty liner a day just in case but states that she does not fill it with urine.      Goals:  (MET 12/27/2023)  Short Term Goals: 4 weeks      Pt to be able to perform a 10 second kegel x 5 reps to demonstrate improving strength and endurance needed for continence. ( MET 12/06/2023)  Patient to demonstrate proper use of urge delay strategies to help reduce urge urinary incontinence with activities of daily living.  ( MET 12/06/2023)  Pt to report a decrease in pad usage to no more than 2-3 a day to demonstrate improving pelvic floor function needed for continence.  ( MET 12/06/2023) 3  Pt to be able to delay the urge to urinate at least 5 minutes with a strong urge to urinate in order to make it to the bathroom without leaking.  ( MET 12/06/2023)  Pt to demonstrate proper diaphragmatic breathing to help with calming the nervous system in order to decrease pain and to improve abdominal wall musculature extensibility.  ( MET 12/06/2023)     Long Term Goals: 12 weeks   Pt to be discharged with home plan for carry over after discharge.    (MET 12/27023)  Pt to be able to perform a 10 second kegel x 10 reps to demonstrate improving strength and endurance needed for continence. ( MET 12/27/2023)   Pt to report a decrease in pad usage to </= 1 pads a day to demonstrate improving pelvic floor muscle controls as evidenced by decreased episodes of incontinence needed to improve confidence in social situations.   ( MET 12/27/2023)  Pt to demonstrate an improved score in the FOTO Urinary Problem survey  to at least 54 to demonstrate improving pelvic floor function aiding in continence during activities of daily living.   (MET 12/27/2023)  Pt to increase pelvic floor strength to at least 4/5 to demonstrate improved strength needed for continence with ADLs.  ( MET 12/27/2023)  Pt to report a decrease in nocturia to no more than 1-2x/night for improved restorative sleep.   (MET 12/06/2023)    Goals Not achieved and why:   All goals achieved as described above.       Discharge reason : Met goals    PLAN   This  patient is discharged from Outpatient [facility].     Tomasa Montes, PT  12/27/2023

## 2023-12-27 ENCOUNTER — CLINICAL SUPPORT (OUTPATIENT)
Dept: REHABILITATION | Facility: HOSPITAL | Age: 73
End: 2023-12-27
Payer: MEDICARE

## 2023-12-27 DIAGNOSIS — M62.89 PELVIC FLOOR DYSFUNCTION: Primary | ICD-10-CM

## 2023-12-27 PROCEDURE — 97530 THERAPEUTIC ACTIVITIES: CPT

## 2023-12-29 ENCOUNTER — HOSPITAL ENCOUNTER (OUTPATIENT)
Dept: RADIOLOGY | Facility: HOSPITAL | Age: 73
Discharge: HOME | End: 2023-12-29
Attending: INTERNAL MEDICINE
Payer: MEDICARE

## 2023-12-29 DIAGNOSIS — Z12.31 SCREENING MAMMOGRAM FOR BREAST CANCER: ICD-10-CM

## 2023-12-29 PROCEDURE — 77067 MAMMO DIGITAL SCREENING BILAT WITH TOMO: ICD-10-PCS | Mod: 26,,, | Performed by: RADIOLOGY

## 2023-12-29 PROCEDURE — 77063 BREAST TOMOSYNTHESIS BI: CPT | Mod: 26,,, | Performed by: RADIOLOGY

## 2023-12-29 PROCEDURE — 77063 MAMMO DIGITAL SCREENING BILAT WITH TOMO: ICD-10-PCS | Mod: 26,,, | Performed by: RADIOLOGY

## 2023-12-29 PROCEDURE — 77067 SCR MAMMO BI INCL CAD: CPT | Mod: TC

## 2023-12-29 PROCEDURE — 77067 SCR MAMMO BI INCL CAD: CPT | Mod: 26,,, | Performed by: RADIOLOGY

## 2024-01-29 ENCOUNTER — OFFICE VISIT (OUTPATIENT)
Dept: OBSTETRICS AND GYNECOLOGY | Facility: CLINIC | Age: 74
End: 2024-01-29
Payer: MEDICARE

## 2024-01-29 VITALS
HEIGHT: 65 IN | DIASTOLIC BLOOD PRESSURE: 67 MMHG | BODY MASS INDEX: 24.87 KG/M2 | WEIGHT: 149.25 LBS | SYSTOLIC BLOOD PRESSURE: 128 MMHG

## 2024-01-29 DIAGNOSIS — Z01.419 ENCOUNTER FOR GYNECOLOGICAL EXAMINATION WITHOUT ABNORMAL FINDING: Primary | ICD-10-CM

## 2024-01-29 DIAGNOSIS — N95.2 POSTMENOPAUSAL ATROPHIC VAGINITIS: ICD-10-CM

## 2024-01-29 DIAGNOSIS — N95.1 MENOPAUSAL SYMPTOMS: ICD-10-CM

## 2024-01-29 PROCEDURE — G0101 CA SCREEN;PELVIC/BREAST EXAM: HCPCS | Mod: GZ,S$GLB,, | Performed by: OBSTETRICS & GYNECOLOGY

## 2024-01-29 PROCEDURE — 99999 PR PBB SHADOW E&M-EST. PATIENT-LVL III: CPT | Mod: PBBFAC,,, | Performed by: OBSTETRICS & GYNECOLOGY

## 2024-01-29 RX ORDER — ESTRADIOL 1 MG/1
1 TABLET ORAL
Qty: 90 TABLET | Refills: 3 | OUTPATIENT
Start: 2024-01-29

## 2024-01-29 RX ORDER — ESTRADIOL 0.1 MG/G
1 CREAM VAGINAL
Qty: 42 G | Refills: 3
Start: 2024-01-29 | End: 2024-03-15 | Stop reason: SDUPTHER

## 2024-01-29 RX ORDER — ESTRADIOL 0.5 MG/1
0.5 TABLET ORAL DAILY
Qty: 90 TABLET | Refills: 3 | Status: SHIPPED | OUTPATIENT
Start: 2024-01-29

## 2024-01-29 RX ORDER — ESTRADIOL 0.1 MG/G
1 CREAM VAGINAL
Qty: 42 G | Refills: 3
Start: 2024-01-29 | End: 2024-01-29 | Stop reason: DRUGHIGH

## 2024-01-29 RX ORDER — ESTRADIOL 0.1 MG/G
CREAM VAGINAL
Qty: 42.5 G | Refills: 11 | OUTPATIENT
Start: 2024-01-29

## 2024-01-29 NOTE — PROGRESS NOTES
Subjective:       Patient ID: Nicole Jacques is a 74 y.o. female.    Chief Complaint:  Well Woman and Gynecologic Exam      History of Present Illness  Gynecologic Exam  Associated symptoms include frequency. Pertinent negatives include no abdominal pain, back pain or headaches.     Nicole Jacques is a 74 y.o. female  here for her annual GYN exam.   She is menopausal since age 34 with her hysterectomy and has been on HRT for many years since that time. She was advised to stop HRT several years ago, but feels better on it and had resumed hot flashes when she tried to stop. We weaned her down to Estradiol 0.5mg last year and she is still doing well. She has been in pelvic Floor PT with some improvement in bladder function.  denies vaginal itching or irritation.  Denies vaginal discharge.  She is sexually active. She has had1 partner for 54 years .   History of abnormal pap: No  Last Pap:  Had hysterectomy  Last MMG: normal- 2023: BI-RADS Category 1: Negative -routine follow-up in 12 months  Last Dexa: 10-: Normal study     Last Colonoscopy:  colonoscopy 3 years ago with abnormalities.(Diverticuli)  denies domestic violence. She does feel safe at home.     Past Medical History:   Diagnosis Date    Anxiety     Cataract     Depression     Family history of colon cancer     GERD (gastroesophageal reflux disease)     Hypertension     Lumbar spondylosis     Lumbar spondylosis     Overactive bladder     Urge urinary incontinence      Past Surgical History:   Procedure Laterality Date    APPENDECTOMY      CATARACT EXTRACTION W/  INTRAOCULAR LENS IMPLANT Left 2014    Dr Drake    CATARACT EXTRACTION W/  INTRAOCULAR LENS IMPLANT Right 2014    COLONOSCOPY N/A 2015    Procedure: COLONOSCOPY;  Surgeon: Desmond Casillas MD;  Location: [address];  Service: Endoscopy;  Laterality: N/A;    COLONOSCOPY N/A 2021    Procedure: COLONOSCOPY;  Surgeon: Desmond Casillas MD;  Location:  "[facility] (4TH FLR);  Service: Endoscopy;  Laterality: N/A;  covid test 3/13 primary care, instructions emailed-[facility]    EYE SURGERY      HYSTERECTOMY      partial    KNEE SURGERY Left     TUBAL LIGATION       Social History     Socioeconomic History    Marital status:    Tobacco Use    Smoking status: Never     Passive exposure: Yes    Smokeless tobacco: Never   Substance and Sexual Activity    Alcohol use: Yes     Comment: Social.    Drug use: No    Sexual activity: Yes     Partners: Male     Comment: , together x 54 years   Other Topics Concern    Are you pregnant or think you may be? No    Breast-feeding No     Family History   Problem Relation Age of Onset    Diabetes Paternal Grandmother     Diabetes Maternal Grandmother     Dementia Father     Cancer Father         bladder    Basal cell carcinoma Father     Hypertension Mother     Kidney disease Mother     Heart disease Mother     Diabetes Brother     Hypertension Brother     Hypertension Sister     Diabetes Sister     Hypertension Sister     Breast cancer Sister 54        recurrence at age 64    Diabetes Sister     No Known Problems Daughter     No Known Problems Son     Cancer Maternal Uncle         colon cancer    Amblyopia Neg Hx     Blindness Neg Hx     Cataracts Neg Hx     Glaucoma Neg Hx     Macular degeneration Neg Hx     Retinal detachment Neg Hx     Strabismus Neg Hx     Melanoma Neg Hx      OB History          2    Para   2    Term   2            AB        Living   2         SAB        IAB        Ectopic        Multiple        Live Births   2                 /67   Ht 5' 5" (1.651 m)   Wt 67.7 kg (149 lb 4 oz)   BMI 24.84 kg/m²         GYN & OB History    Date of Last Pap: No result found    OB History    Para Term  AB Living   2 2 2     2   SAB IAB Ectopic Multiple Live Births           2      # Outcome Date GA Lbr Lui/2nd Weight Sex Delivery Anes PTL Lv   2 Term      Vag-Spont   LIZZY   1 Term      " Vag-Spont   LIZZY       Review of Systems  Review of Systems   Constitutional:  Negative for activity change, appetite change, fatigue and unexpected weight change.   HENT: Negative.     Eyes:  Negative for visual disturbance.   Respiratory:  Negative for shortness of breath and wheezing.    Cardiovascular:  Negative for chest pain, palpitations and leg swelling.   Gastrointestinal:  Negative for abdominal pain, bloating and blood in stool.   Endocrine: Negative for diabetes and hair loss.   Genitourinary:  Positive for frequency and vaginal dryness. Negative for decreased libido and dyspareunia.   Musculoskeletal:  Negative for back pain and joint swelling.   Integumentary:  Negative for acne, hair changes and nipple discharge.   Neurological:  Negative for headaches.   Hematological:  Does not bruise/bleed easily.   Psychiatric/Behavioral:  Negative for depression and sleep disturbance. The patient is not nervous/anxious.    Breast: Negative for mastodynia and nipple discharge          Objective:      Physical Exam:   Constitutional: She is oriented to person, place, and time. She appears well-developed and well-nourished.    HENT:   Head: Normocephalic and atraumatic.    Eyes: Pupils are equal, round, and reactive to light. EOM are normal.     Cardiovascular:  Normal rate and regular rhythm.             Pulmonary/Chest: Effort normal and breath sounds normal.   BREASTS:  no mass, no tenderness, no deformity and no retraction. Right breast exhibits no inverted nipple, no mass, no nipple discharge, no skin change, no tenderness, no bleeding and no swelling. Left breast exhibits no inverted nipple, no mass, no nipple discharge, no skin change, no tenderness, no bleeding and no swelling. Breasts are symmetrical.              Abdominal: Soft. Bowel sounds are normal.     Genitourinary:    Pelvic exam was performed with patient supine.      Genitourinary Comments: PELVIC: Normal external female genitalia without lesions.  Normal hair distribution. Adequate perineal body, normal urethral meatus. Vagina atrophic without lesions or discharge. No significant cystocele or rectocele. Bimanual exam shows uterus and cervix to be surgically absent. Adnexa without masses or tenderness.  RECTAL: Deferred                 Musculoskeletal: Normal range of motion and moves all extremeties.       Neurological: She is alert and oriented to person, place, and time.    Skin: Skin is warm and dry.    Psychiatric: She has a normal mood and affect.              Assessment:        1. Encounter for gynecological examination without abnormal finding    2. Postmenopausal atrophic vaginitis    3. Menopausal symptoms                Plan:      Problem List Items Addressed This Visit    None  Visit Diagnoses       Encounter for gynecological examination without abnormal finding    -  Primary    Postmenopausal atrophic vaginitis        Relevant Medications    estradioL (ESTRACE) 0.01 % (0.1 mg/gram) vaginal cream    Menopausal symptoms        Relevant Medications    estradioL (ESTRACE) 0.5 MG tablet             No follow-ups on file.

## 2024-03-04 ENCOUNTER — TELEPHONE (OUTPATIENT)
Dept: NEUROLOGY | Facility: CLINIC | Age: 74
End: 2024-03-04
Payer: MEDICARE

## 2024-03-12 ENCOUNTER — TELEPHONE (OUTPATIENT)
Dept: NEUROLOGY | Facility: CLINIC | Age: 74
End: 2024-03-12
Payer: MEDICARE

## 2024-03-15 DIAGNOSIS — N95.2 POSTMENOPAUSAL ATROPHIC VAGINITIS: ICD-10-CM

## 2024-03-18 RX ORDER — ESTRADIOL 0.1 MG/G
1 CREAM VAGINAL
Qty: 42 G | Refills: 3
Start: 2024-03-18 | End: 2024-03-20 | Stop reason: SDUPTHER

## 2024-03-20 ENCOUNTER — NURSE TRIAGE (OUTPATIENT)
Dept: ADMINISTRATIVE | Facility: CLINIC | Age: 74
End: 2024-03-20
Payer: MEDICARE

## 2024-03-20 DIAGNOSIS — N95.2 POSTMENOPAUSAL ATROPHIC VAGINITIS: ICD-10-CM

## 2024-03-20 RX ORDER — ESTRADIOL 0.1 MG/G
1 CREAM VAGINAL
Qty: 42 G | Refills: 3 | Status: SHIPPED | OUTPATIENT
Start: 2024-03-20

## 2024-03-20 NOTE — TELEPHONE ENCOUNTER
Called patient no answer  to inform that her refill request has been forwarded to our PA madyson Stauffer due to Dr Mata is out for jury duty.

## 2024-03-20 NOTE — TELEPHONE ENCOUNTER
Pt call regarding estradioL vaginal cream. Was under the impression this would be called in for her today but has not been. I have explained to the patient that the medication is recorded in her EMR. Offered to call pharmacy in this regard.  Pharmacy reports they did receive the prescription but needs to verify if the patient is taking tablet and vaginal or just vaginal. I have updated patient and explained I would route this to the providers office for follow up during office hours.    Reason for Disposition   [1] Caller has NON-URGENT medicine question about med that PCP prescribed AND [2] triager unable to answer question    Protocols used: Medication Question Call-A-AH

## 2024-03-21 ENCOUNTER — TELEPHONE (OUTPATIENT)
Dept: OBSTETRICS AND GYNECOLOGY | Facility: CLINIC | Age: 74
End: 2024-03-21
Payer: MEDICARE

## 2024-03-21 DIAGNOSIS — I10 ESSENTIAL HYPERTENSION: ICD-10-CM

## 2024-03-21 DIAGNOSIS — K21.9 GASTROESOPHAGEAL REFLUX DISEASE: ICD-10-CM

## 2024-03-21 RX ORDER — AMLODIPINE BESYLATE 10 MG/1
TABLET ORAL
Qty: 90 TABLET | Refills: 2 | Status: SHIPPED | OUTPATIENT
Start: 2024-03-21

## 2024-03-21 RX ORDER — OMEPRAZOLE 20 MG/1
CAPSULE, DELAYED RELEASE ORAL
Qty: 90 CAPSULE | Refills: 2 | Status: SHIPPED | OUTPATIENT
Start: 2024-03-21

## 2024-03-21 NOTE — TELEPHONE ENCOUNTER
Refill Decision Note   Nicole Websterain  is requesting a refill authorization.  Brief Assessment and Rationale for Refill:  Approve     Medication Therapy Plan:         Comments:     Note composed:12:24 PM 03/21/2024

## 2024-03-21 NOTE — TELEPHONE ENCOUNTER
Called patient and [facility] to clarify that patient taking Estradiol tablets and also using the vaginal cream. Pharmacist said thanks patients vaginal cream is being processed.

## 2024-03-21 NOTE — TELEPHONE ENCOUNTER
No care due was identified.  [facility] Embedded Care Due Messages. Reference number: [id number].   3/21/2024 12:39:10 AM CDT

## 2024-04-04 DIAGNOSIS — F41.1 GAD (GENERALIZED ANXIETY DISORDER): ICD-10-CM

## 2024-04-04 DIAGNOSIS — F43.21 ADJUSTMENT DISORDER WITH DEPRESSED MOOD: ICD-10-CM

## 2024-04-04 NOTE — TELEPHONE ENCOUNTER
No care due was identified.  Health [facility] Embedded Care Due Messages. Reference number: [id number].   4/04/2024 4:47:14 AM CDT

## 2024-04-05 RX ORDER — ESCITALOPRAM OXALATE 20 MG/1
TABLET ORAL
Qty: 90 TABLET | Refills: 2 | Status: SHIPPED | OUTPATIENT
Start: 2024-04-05

## 2024-04-05 NOTE — TELEPHONE ENCOUNTER
Refill Decision Note   Nicole Sawyer  is requesting a refill authorization.  Brief Assessment and Rationale for Refill:  Approve     Medication Therapy Plan:         Comments:     Note composed:11:36 AM 04/05/2024

## 2024-04-23 ENCOUNTER — TELEPHONE (OUTPATIENT)
Dept: NEUROLOGY | Facility: CLINIC | Age: 74
End: 2024-04-23
Payer: MEDICARE

## 2024-04-23 NOTE — TELEPHONE ENCOUNTER
Spoke to Pt. I let her know her appt with Dr Licea was scheduled incorrectly and will be canceled. I asked if she wanted to be seen for her memory issue. At first she stated she needed a neurologist to help her ween off of Xanax then later stated she is having some memory issue and would like to see someone for that as well.

## 2024-04-24 ENCOUNTER — TELEPHONE (OUTPATIENT)
Dept: NEUROLOGY | Facility: CLINIC | Age: 74
End: 2024-04-24
Payer: MEDICARE

## 2024-05-01 ENCOUNTER — TELEPHONE (OUTPATIENT)
Dept: PRIMARY CARE CLINIC | Facility: CLINIC | Age: 74
End: 2024-05-01
Payer: MEDICARE

## 2024-05-01 DIAGNOSIS — I10 ESSENTIAL HYPERTENSION: ICD-10-CM

## 2024-05-01 DIAGNOSIS — R77.8 ELEVATED TOTAL PROTEIN: Primary | ICD-10-CM

## 2024-05-01 NOTE — TELEPHONE ENCOUNTER
For the record, I am happy to order advance labs on any established patient. I give no advance orders for new patients. Schedule one week before the visit is preferred.   Non-fasting labs ordered, Lipids and HbA1c done in December do not need to be repeated at this time.

## 2024-05-01 NOTE — TELEPHONE ENCOUNTER
Called and sp with pt, she states that she spoke to Ronna Grove who told her to call prior to her appointment on 06/07/24. I told pt that there are no requests for lab work which usually means you will decide what lab work is appropriate during the appointment. Pt insisted that I reach out to you, states that she would like to do her blood work 2 weeks in advance of her appt with you so you can go over the results in person with her.

## 2024-05-01 NOTE — TELEPHONE ENCOUNTER
----- Message from Hellen Atkinson sent at 5/1/2024  9:16 AM CDT -----  Patient Returning Call    Who Called? PT     Who Left Message for Patient? Ronna Grove MA    Would the patient rather a call back? YES      Best Call Back Number: [phone]    Additional Information:  THANK YOU

## 2024-05-02 ENCOUNTER — OFFICE VISIT (OUTPATIENT)
Dept: NEUROLOGY | Facility: CLINIC | Age: 74
End: 2024-05-02
Payer: MEDICARE

## 2024-05-02 ENCOUNTER — LAB VISIT (OUTPATIENT)
Dept: LAB | Facility: HOSPITAL | Age: 74
End: 2024-05-02
Payer: MEDICARE

## 2024-05-02 VITALS — SYSTOLIC BLOOD PRESSURE: 107 MMHG | HEART RATE: 64 BPM | DIASTOLIC BLOOD PRESSURE: 68 MMHG

## 2024-05-02 DIAGNOSIS — G31.84 MCI (MILD COGNITIVE IMPAIRMENT) WITH MEMORY LOSS: Primary | ICD-10-CM

## 2024-05-02 DIAGNOSIS — I10 PRIMARY HYPERTENSION: ICD-10-CM

## 2024-05-02 DIAGNOSIS — G60.9 NEUROPATHY, IDIOPATHIC: ICD-10-CM

## 2024-05-02 DIAGNOSIS — G31.84 MCI (MILD COGNITIVE IMPAIRMENT) WITH MEMORY LOSS: ICD-10-CM

## 2024-05-02 DIAGNOSIS — F41.1 GAD (GENERALIZED ANXIETY DISORDER): ICD-10-CM

## 2024-05-02 DIAGNOSIS — G25.0 BENIGN ESSENTIAL TREMOR: ICD-10-CM

## 2024-05-02 LAB
FOLATE SERPL-MCNC: >40 NG/ML (ref 4–24)
TSH SERPL DL<=0.005 MIU/L-ACNC: 0.87 UIU/ML (ref 0.4–4)
VIT B12 SERPL-MCNC: 1563 PG/ML (ref 210–950)

## 2024-05-02 PROCEDURE — 3288F FALL RISK ASSESSMENT DOCD: CPT | Mod: CPTII,GC,S$GLB, | Performed by: STUDENT IN AN ORGANIZED HEALTH CARE EDUCATION/TRAINING PROGRAM

## 2024-05-02 PROCEDURE — 82300 ASSAY OF CADMIUM: CPT | Performed by: STUDENT IN AN ORGANIZED HEALTH CARE EDUCATION/TRAINING PROGRAM

## 2024-05-02 PROCEDURE — 83921 ORGANIC ACID SINGLE QUANT: CPT | Performed by: STUDENT IN AN ORGANIZED HEALTH CARE EDUCATION/TRAINING PROGRAM

## 2024-05-02 PROCEDURE — 36415 COLL VENOUS BLD VENIPUNCTURE: CPT | Performed by: STUDENT IN AN ORGANIZED HEALTH CARE EDUCATION/TRAINING PROGRAM

## 2024-05-02 PROCEDURE — 99214 OFFICE O/P EST MOD 30 MIN: CPT | Mod: GC,S$GLB,, | Performed by: STUDENT IN AN ORGANIZED HEALTH CARE EDUCATION/TRAINING PROGRAM

## 2024-05-02 PROCEDURE — 4010F ACE/ARB THERAPY RXD/TAKEN: CPT | Mod: CPTII,GC,S$GLB, | Performed by: STUDENT IN AN ORGANIZED HEALTH CARE EDUCATION/TRAINING PROGRAM

## 2024-05-02 PROCEDURE — 84443 ASSAY THYROID STIM HORMONE: CPT | Performed by: STUDENT IN AN ORGANIZED HEALTH CARE EDUCATION/TRAINING PROGRAM

## 2024-05-02 PROCEDURE — 82607 VITAMIN B-12: CPT | Performed by: STUDENT IN AN ORGANIZED HEALTH CARE EDUCATION/TRAINING PROGRAM

## 2024-05-02 PROCEDURE — 82746 ASSAY OF FOLIC ACID SERUM: CPT | Performed by: STUDENT IN AN ORGANIZED HEALTH CARE EDUCATION/TRAINING PROGRAM

## 2024-05-02 PROCEDURE — 3074F SYST BP LT 130 MM HG: CPT | Mod: CPTII,GC,S$GLB, | Performed by: STUDENT IN AN ORGANIZED HEALTH CARE EDUCATION/TRAINING PROGRAM

## 2024-05-02 PROCEDURE — 1126F AMNT PAIN NOTED NONE PRSNT: CPT | Mod: CPTII,GC,S$GLB, | Performed by: STUDENT IN AN ORGANIZED HEALTH CARE EDUCATION/TRAINING PROGRAM

## 2024-05-02 PROCEDURE — 3078F DIAST BP <80 MM HG: CPT | Mod: CPTII,GC,S$GLB, | Performed by: STUDENT IN AN ORGANIZED HEALTH CARE EDUCATION/TRAINING PROGRAM

## 2024-05-02 PROCEDURE — 1101F PT FALLS ASSESS-DOCD LE1/YR: CPT | Mod: CPTII,GC,S$GLB, | Performed by: STUDENT IN AN ORGANIZED HEALTH CARE EDUCATION/TRAINING PROGRAM

## 2024-05-02 PROCEDURE — 84425 ASSAY OF VITAMIN B-1: CPT | Performed by: STUDENT IN AN ORGANIZED HEALTH CARE EDUCATION/TRAINING PROGRAM

## 2024-05-02 PROCEDURE — 1159F MED LIST DOCD IN RCRD: CPT | Mod: CPTII,GC,S$GLB, | Performed by: STUDENT IN AN ORGANIZED HEALTH CARE EDUCATION/TRAINING PROGRAM

## 2024-05-02 PROCEDURE — 99999 PR PBB SHADOW E&M-EST. PATIENT-LVL IV: CPT | Mod: PBBFAC,GC,, | Performed by: STUDENT IN AN ORGANIZED HEALTH CARE EDUCATION/TRAINING PROGRAM

## 2024-05-02 RX ORDER — PRIMIDONE 50 MG/1
50 TABLET ORAL DAILY
Qty: 30 TABLET | Refills: 11 | Status: SHIPPED | OUTPATIENT
Start: 2024-05-02 | End: 2025-05-02
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
## 2024-05-03 LAB
ARSENIC BLD-MCNC: <1 NG/ML
CADMIUM BLD-MCNC: 0.2 NG/ML
CITY: NORMAL
COUNTY: NORMAL
GUARDIAN FIRST NAME: NORMAL
GUARDIAN LAST NAME: NORMAL
HOME PHONE: NORMAL
LEAD BLD-MCNC: <1 MCG/DL
MERCURY BLD-MCNC: <1 NG/ML
RACE: NORMAL
STATE: NORMAL
STREET ADDRESS: NORMAL
VENOUS/CAPILLARY: NORMAL
ZIP: NORMAL

## 2024-05-04 DIAGNOSIS — I99.8 WHITE MATTER DISEASE OF BRAIN DUE TO ISCHEMIA: ICD-10-CM

## 2024-05-04 DIAGNOSIS — R90.82 WHITE MATTER DISEASE OF BRAIN DUE TO ISCHEMIA: ICD-10-CM

## 2024-05-05 RX ORDER — PRAVASTATIN SODIUM 10 MG/1
TABLET ORAL
Qty: 90 TABLET | Refills: 1 | Status: SHIPPED | OUTPATIENT
Start: 2024-05-05

## 2024-05-05 NOTE — TELEPHONE ENCOUNTER
No care due was identified.  [facility] Embedded Care Due Messages. Reference number: [id number].   5/04/2024 9:36:56 PM CDT

## 2024-05-05 NOTE — TELEPHONE ENCOUNTER
Refill Decision Note   Nicole Sawyer  is requesting a refill authorization.  Brief Assessment and Rationale for Refill:  Approve     Medication Therapy Plan:         Comments:     Note composed:3:01 AM 05/05/2024

## 2024-05-07 LAB — METHYLMALONATE SERPL-SCNC: 0.18 UMOL/L

## 2024-05-09 DIAGNOSIS — I10 ESSENTIAL HYPERTENSION: ICD-10-CM

## 2024-05-09 LAB — VIT B1 BLD-MCNC: 56 UG/L (ref 38–122)

## 2024-05-09 RX ORDER — LOSARTAN POTASSIUM 100 MG/1
100 TABLET ORAL
Qty: 90 TABLET | Refills: 1 | Status: SHIPPED | OUTPATIENT
Start: 2024-05-09

## 2024-05-10 NOTE — TELEPHONE ENCOUNTER
Refill Decision Note   Nicole Sawyer  is requesting a refill authorization.  Brief Assessment and Rationale for Refill:  Approve     Medication Therapy Plan:         Comments:     Note composed:10:03 PM 05/09/2024

## 2024-05-10 NOTE — TELEPHONE ENCOUNTER
No care due was identified.  [facility] Embedded Care Due Messages. Reference number: [id number].   5/09/2024 9:40:46 PM CDT

## 2024-05-31 ENCOUNTER — LAB VISIT (OUTPATIENT)
Dept: LAB | Facility: HOSPITAL | Age: 74
End: 2024-05-31
Attending: INTERNAL MEDICINE
Payer: MEDICARE

## 2024-05-31 DIAGNOSIS — R77.8 ELEVATED TOTAL PROTEIN: ICD-10-CM

## 2024-05-31 DIAGNOSIS — I10 ESSENTIAL HYPERTENSION: ICD-10-CM

## 2024-05-31 LAB
ALBUMIN SERPL BCP-MCNC: 4.3 G/DL (ref 3.5–5.2)
ALP SERPL-CCNC: 51 U/L (ref 38–126)
ALT SERPL W/O P-5'-P-CCNC: 18 U/L (ref 10–44)
ANION GAP SERPL CALC-SCNC: 10 MMOL/L (ref 8–16)
AST SERPL-CCNC: 29 U/L (ref 15–46)
BASOPHILS # BLD AUTO: 0.01 K/UL (ref 0–0.2)
BASOPHILS NFR BLD: 0.3 % (ref 0–1.9)
BILIRUB SERPL-MCNC: 0.4 MG/DL (ref 0.1–1)
CALCIUM SERPL-MCNC: 9.4 MG/DL (ref 8.7–10.5)
CHLORIDE SERPL-SCNC: 104 MMOL/L (ref 95–110)
CO2 SERPL-SCNC: 28 MMOL/L (ref 23–29)
CREAT SERPL-MCNC: 0.99 MG/DL (ref 0.5–1.4)
DIFFERENTIAL METHOD BLD: ABNORMAL
EOSINOPHIL # BLD AUTO: 0.1 K/UL (ref 0–0.5)
EOSINOPHIL NFR BLD: 1.9 % (ref 0–8)
ERYTHROCYTE [DISTWIDTH] IN BLOOD BY AUTOMATED COUNT: 12.8 % (ref 11.5–14.5)
EST. GFR  (NO RACE VARIABLE): 59.5 ML/MIN/1.73 M^2
GLUCOSE SERPL-MCNC: 150 MG/DL (ref 70–110)
HCT VFR BLD AUTO: 35.7 % (ref 37–48.5)
HGB BLD-MCNC: 11.8 G/DL (ref 12–16)
IMM GRANULOCYTES # BLD AUTO: 0.01 K/UL (ref 0–0.04)
IMM GRANULOCYTES NFR BLD AUTO: 0.3 % (ref 0–0.5)
LYMPHOCYTES # BLD AUTO: 0.9 K/UL (ref 1–4.8)
LYMPHOCYTES NFR BLD: 25.5 % (ref 18–48)
MCH RBC QN AUTO: 31.6 PG (ref 27–31)
MCHC RBC AUTO-ENTMCNC: 33.1 G/DL (ref 32–36)
MCV RBC AUTO: 96 FL (ref 82–98)
MONOCYTES # BLD AUTO: 0.3 K/UL (ref 0.3–1)
MONOCYTES NFR BLD: 7.9 % (ref 4–15)
NEUTROPHILS # BLD AUTO: 2.4 K/UL (ref 1.8–7.7)
NEUTROPHILS NFR BLD: 64.1 % (ref 38–73)
NRBC BLD-RTO: 0 /100 WBC
PLATELET # BLD AUTO: 256 K/UL (ref 150–450)
PMV BLD AUTO: 11.1 FL (ref 9.2–12.9)
POTASSIUM SERPL-SCNC: 4.5 MMOL/L (ref 3.5–5.1)
PROT SERPL-MCNC: 8.2 G/DL (ref 6–8.4)
RBC # BLD AUTO: 3.74 M/UL (ref 4–5.4)
SODIUM SERPL-SCNC: 142 MMOL/L (ref 136–145)
UUN UR-MCNC: 26 MG/DL (ref 7–17)
WBC # BLD AUTO: 3.68 K/UL (ref 3.9–12.7)

## 2024-05-31 PROCEDURE — 36415 COLL VENOUS BLD VENIPUNCTURE: CPT | Mod: PN | Performed by: INTERNAL MEDICINE

## 2024-05-31 PROCEDURE — 85025 COMPLETE CBC W/AUTO DIFF WBC: CPT | Mod: PN | Performed by: INTERNAL MEDICINE

## 2024-05-31 PROCEDURE — 80053 COMPREHEN METABOLIC PANEL: CPT | Mod: PN | Performed by: INTERNAL MEDICINE

## 2024-05-31 PROCEDURE — 84165 PROTEIN E-PHORESIS SERUM: CPT | Mod: PN | Performed by: INTERNAL MEDICINE

## 2024-05-31 PROCEDURE — 84165 PROTEIN E-PHORESIS SERUM: CPT | Mod: 26,,, | Performed by: PATHOLOGY

## 2024-06-03 LAB
ALBUMIN SERPL ELPH-MCNC: 3.86 G/DL (ref 3.35–5.55)
ALPHA1 GLOB SERPL ELPH-MCNC: 0.25 G/DL (ref 0.17–0.41)
ALPHA2 GLOB SERPL ELPH-MCNC: 0.89 G/DL (ref 0.43–0.99)
B-GLOBULIN SERPL ELPH-MCNC: 0.84 G/DL (ref 0.5–1.1)
GAMMA GLOB SERPL ELPH-MCNC: 1.86 G/DL (ref 0.67–1.58)
PROT SERPL-MCNC: 7.7 G/DL (ref 6–8.4)

## 2024-06-04 LAB — PATHOLOGIST INTERPRETATION SPE: NORMAL

## 2024-06-07 ENCOUNTER — OFFICE VISIT (OUTPATIENT)
Dept: PRIMARY CARE CLINIC | Facility: CLINIC | Age: 74
End: 2024-06-07
Payer: MEDICARE

## 2024-06-07 VITALS
OXYGEN SATURATION: 97 % | WEIGHT: 147.94 LBS | SYSTOLIC BLOOD PRESSURE: 98 MMHG | BODY MASS INDEX: 24.65 KG/M2 | HEART RATE: 70 BPM | HEIGHT: 65 IN | TEMPERATURE: 98 F | DIASTOLIC BLOOD PRESSURE: 58 MMHG

## 2024-06-07 DIAGNOSIS — G31.84 MCI (MILD COGNITIVE IMPAIRMENT) WITH MEMORY LOSS: ICD-10-CM

## 2024-06-07 DIAGNOSIS — N18.31 CHRONIC KIDNEY DISEASE, STAGE 3A: ICD-10-CM

## 2024-06-07 DIAGNOSIS — F33.0 MAJOR DEPRESSIVE DISORDER, RECURRENT, MILD: ICD-10-CM

## 2024-06-07 DIAGNOSIS — F13.20 SEDATIVE, HYPNOTIC OR ANXIOLYTIC DEPENDENCE, UNCOMPLICATED: ICD-10-CM

## 2024-06-07 DIAGNOSIS — R73.03 PRE-DIABETES: ICD-10-CM

## 2024-06-07 DIAGNOSIS — G25.0 BENIGN ESSENTIAL TREMOR: ICD-10-CM

## 2024-06-07 DIAGNOSIS — I10 ESSENTIAL HYPERTENSION: Primary | ICD-10-CM

## 2024-06-07 DIAGNOSIS — F41.1 GAD (GENERALIZED ANXIETY DISORDER): ICD-10-CM

## 2024-06-07 PROCEDURE — 4010F ACE/ARB THERAPY RXD/TAKEN: CPT | Mod: CPTII,S$GLB,, | Performed by: INTERNAL MEDICINE

## 2024-06-07 PROCEDURE — 1101F PT FALLS ASSESS-DOCD LE1/YR: CPT | Mod: CPTII,S$GLB,, | Performed by: INTERNAL MEDICINE

## 2024-06-07 PROCEDURE — 1160F RVW MEDS BY RX/DR IN RCRD: CPT | Mod: CPTII,S$GLB,, | Performed by: INTERNAL MEDICINE

## 2024-06-07 PROCEDURE — 1126F AMNT PAIN NOTED NONE PRSNT: CPT | Mod: CPTII,S$GLB,, | Performed by: INTERNAL MEDICINE

## 2024-06-07 PROCEDURE — 3288F FALL RISK ASSESSMENT DOCD: CPT | Mod: CPTII,S$GLB,, | Performed by: INTERNAL MEDICINE

## 2024-06-07 PROCEDURE — 1159F MED LIST DOCD IN RCRD: CPT | Mod: CPTII,S$GLB,, | Performed by: INTERNAL MEDICINE

## 2024-06-07 PROCEDURE — 99999 PR PBB SHADOW E&M-EST. PATIENT-LVL IV: CPT | Mod: PBBFAC,,, | Performed by: INTERNAL MEDICINE

## 2024-06-07 PROCEDURE — 3078F DIAST BP <80 MM HG: CPT | Mod: CPTII,S$GLB,, | Performed by: INTERNAL MEDICINE

## 2024-06-07 PROCEDURE — 3074F SYST BP LT 130 MM HG: CPT | Mod: CPTII,S$GLB,, | Performed by: INTERNAL MEDICINE

## 2024-06-07 PROCEDURE — 99214 OFFICE O/P EST MOD 30 MIN: CPT | Mod: S$GLB,,, | Performed by: INTERNAL MEDICINE

## 2024-06-07 RX ORDER — ALPRAZOLAM 2 MG/1
2 TABLET ORAL DAILY PRN
Qty: 30 TABLET | Refills: 5 | Status: SHIPPED | OUTPATIENT
Start: 2024-06-07

## 2024-06-07 NOTE — PROGRESS NOTES
Subjective     Patient ID: Nicole Jacques is a 74 y.o. female.    Chief Complaint: Annual Exam    Last seen 6 months ago. Returns for annual physical and f/u chronic medical conditions. Evaluated by Neurology for MCI - started Primidone. She is weaning down use of alprazolam - one a day now.   BP usually normal at home, 120's systolic per history. Taking daily meds as prescribed.     PMH: , misc x 1.    Hypertension.  Pre-Diabetes, HbA1c up to 6.3, 5.8% Dec. '23, LDL 95.  GERD, EGD  only showed hiatal hernia.  Depression with Anxiety.    Lumbar Spondylosis.  Mild OA Knees.   Sigmoid Diverticulosis.   Microvascular White Matter disease.   Elevated Total Protein level.   Mild CKD, GFR 50's.    Colonoscopy 3/21 normal, repeat 10 yrs. Mammogram normal . Pelvic exam . BMD normal 10/23. Eye exam  outside [facility]. Vaccines reviewed.     SOCIAL: No tobacco,  smokes. Social alcohol.  with 2 children. 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     PSH: BTL, Hysterectomy. Left knee scope. Appendectomy. Bilateral Cataract extractions and Blepharoplasty.     FMH: Father  age 81 with bladder cancer and dementia. Mother  age 78 with hypertension and renal failure, and CHF, possible osteoporosis. Diabetes in grandmothers. Anxiety - mother, sister. Colon cancer in maternal uncle.    Allergies: Bactrim.    Medications: list reviewed and reconciled.               Review of Systems   Constitutional:  Negative for activity change, appetite change, fatigue, fever and unexpected weight change.   HENT:  Negative for nasal congestion, ear pain, hearing loss, rhinorrhea, sneezing, sore throat, trouble swallowing and voice change.    Eyes:  Negative for pain and visual disturbance.   Respiratory:  Negative for cough, chest tightness, shortness of breath and wheezing.    Cardiovascular:  Negative for chest pain, palpitations and leg swelling.   Gastrointestinal:  Negative for abdominal pain,  "blood in stool, constipation, diarrhea, nausea and vomiting.   Genitourinary:  Positive for bladder incontinence. Negative for dysuria, frequency, pelvic pain and vaginal bleeding.   Musculoskeletal:  Negative for arthralgias, gait problem, joint swelling and myalgias.   Integumentary:  Negative for color change and rash.   Neurological:  Positive for tremors. Negative for dizziness, syncope, facial asymmetry, speech difficulty, weakness, numbness and headaches.   Hematological:  Negative for adenopathy. Does not bruise/bleed easily.   Psychiatric/Behavioral:  Negative for dysphoric mood and sleep disturbance. The patient is nervous/anxious.           Objective   Vitals:    06/07/24 1027   BP: (!) 98/58   BP Location: Right arm   Patient Position: Sitting   Pulse: 70   Temp: 97.7 °F (36.5 °C)   TempSrc: Oral   SpO2: 97%   Weight: 67.1 kg (147 lb 14.9 oz)          From 150 six months ago.    Height: 5' 5" (1.651 m)   BMI=24.6  Physical Exam  Vitals reviewed.   Constitutional:       General: She is not in acute distress.     Appearance: She is well-developed. She is not ill-appearing or diaphoretic.   HENT:      Head: Normocephalic and atraumatic.      Right Ear: Tympanic membrane and ear canal normal.      Left Ear: Tympanic membrane and ear canal normal.      Nose: Nose normal. No congestion.      Mouth/Throat:      Mouth: Mucous membranes are moist.      Pharynx: Oropharynx is clear.   Eyes:      General: No scleral icterus.     Extraocular Movements: Extraocular movements intact.      Conjunctiva/sclera: Conjunctivae normal.      Right eye: Right conjunctiva is not injected.      Left eye: Left conjunctiva is not injected.      Pupils: Pupils are equal, round, and reactive to light.   Neck:      Thyroid: No thyromegaly.      Vascular: No carotid bruit or JVD.   Cardiovascular:      Rate and Rhythm: Normal rate and regular rhythm.      Pulses: Normal pulses.      Heart sounds: Normal heart sounds. No murmur " heard.     No friction rub. No gallop.   Pulmonary:      Effort: Pulmonary effort is normal. No respiratory distress.      Breath sounds: Normal breath sounds. No wheezing, rhonchi or rales.   Abdominal:      General: Bowel sounds are normal. There is no distension.      Palpations: Abdomen is soft. There is no mass.      Tenderness: There is no abdominal tenderness.   Musculoskeletal:         General: No tenderness or deformity. Normal range of motion.      Cervical back: Normal range of motion and neck supple.      Right lower leg: No edema.      Left lower leg: No edema.   Lymphadenopathy:      Cervical: No cervical adenopathy.   Skin:     General: Skin is warm and dry.      Coloration: Skin is not pale.      Findings: No erythema or rash.      Nails: There is no clubbing.   Neurological:      General: No focal deficit present.      Mental Status: She is alert and oriented to person, place, and time.      Cranial Nerves: No cranial nerve deficit.      Motor: Tremor present. No weakness or abnormal muscle tone.      Coordination: Coordination normal.      Gait: Gait normal.   Psychiatric:         Mood and Affect: Mood and affect normal.         Speech: Speech normal.         Behavior: Behavior normal.         Thought Content: Thought content normal.         Judgment: Judgment normal.       Lab Visit on 05/31/2024   Component Date Value    WBC 05/31/2024 3.68 (L)     RBC 05/31/2024 3.74 (L)     Hemoglobin 05/31/2024 11.8 (L)     Hematocrit 05/31/2024 35.7 (L)     MCV 05/31/2024 96     MCH 05/31/2024 31.6 (H)     MCHC 05/31/2024 33.1     RDW 05/31/2024 12.8     Platelets 05/31/2024 256     MPV 05/31/2024 11.1     Immature Granulocytes 05/31/2024 0.3     Gran # (ANC) 05/31/2024 2.4     Immature Grans (Abs) 05/31/2024 0.01     Lymph # 05/31/2024 0.9 (L)     Mono # 05/31/2024 0.3     Eos # 05/31/2024 0.1     Baso # 05/31/2024 0.01     nRBC 05/31/2024 0     Gran % 05/31/2024 64.1     Lymph % 05/31/2024 25.5     Mono %  05/31/2024 7.9     Eosinophil % 05/31/2024 1.9     Basophil % 05/31/2024 0.3     Differential Method 05/31/2024 Automated     Sodium 05/31/2024 142     Potassium 05/31/2024 4.5     Chloride 05/31/2024 104     CO2 05/31/2024 28     Glucose 05/31/2024 150 (H)     BUN 05/31/2024 26 (H)     Creatinine 05/31/2024 0.99     Calcium 05/31/2024 9.4     Total Protein 05/31/2024 8.2     Albumin 05/31/2024 4.3     Total Bilirubin 05/31/2024 0.4     Alkaline Phosphatase 05/31/2024 51     AST 05/31/2024 29     ALT 05/31/2024 18     Anion Gap 05/31/2024 10     eGFR 05/31/2024 59.5 (A)     Protein, Serum 05/31/2024 7.7     Albumin 05/31/2024 3.86     Alpha-1 05/31/2024 0.25     Alpha-2 05/31/2024 0.89     Beta 05/31/2024 0.84     Gamma 05/31/2024 1.86 (H)     Pathologist Interpretati* 05/31/2024 REVIEWED         Assessment and Plan     1. Essential hypertension controlled       -   BP typically normal, continue Amlodipine 10, Losartan 100 mg daily.    2. Pre-diabetes - counseled on diet.     3. Chronic kidney disease, stage 3a - avoiding NSAIDS, stay well hydrated.     4. Sedative, hypnotic or anxiolytic dependence, uncomplicated    5. Major depressive disorder, recurrent, mild - stable on Lexapro 20 mg daily.    6. ROSALIA (generalized anxiety disorder)  -     ALPRAZolam (XANAX) 2 MG Tab; Take 1 tablet (2 mg total) by mouth daily as needed (Anxiety.). as needed for anxiety.  Dispense: 30 tablet; Refill: 5    7. MCI (mild cognitive impairment) with memory loss - continue Primidone.     8. Benign essential tremor       Follow up in about 6 months (around 12/7/2024).

## 2024-10-01 DIAGNOSIS — I10 ESSENTIAL HYPERTENSION: ICD-10-CM

## 2024-10-02 RX ORDER — LOSARTAN POTASSIUM 100 MG/1
100 TABLET ORAL DAILY
Qty: 90 TABLET | Refills: 2 | Status: SHIPPED | OUTPATIENT
Start: 2024-10-02

## 2024-10-02 NOTE — TELEPHONE ENCOUNTER
Refill Decision Note   Nicolekieran Websterain  is requesting a refill authorization.  Brief Assessment and Rationale for Refill:  Approve     Medication Therapy Plan:         Comments:     Note composed:9:31 AM 10/02/2024

## 2024-10-02 NOTE — TELEPHONE ENCOUNTER
No care due was identified.  [facility] Embedded Care Due Messages. Reference number: [id number].   10/01/2024 9:29:09 PM CDT

## 2024-10-29 DIAGNOSIS — F41.1 GAD (GENERALIZED ANXIETY DISORDER): ICD-10-CM

## 2024-10-29 DIAGNOSIS — F43.21 ADJUSTMENT DISORDER WITH DEPRESSED MOOD: ICD-10-CM

## 2024-10-29 RX ORDER — ESCITALOPRAM OXALATE 20 MG/1
TABLET ORAL
Qty: 90 TABLET | Refills: 2 | Status: SHIPPED | OUTPATIENT
Start: 2024-10-29

## 2024-12-02 DIAGNOSIS — K21.9 GASTROESOPHAGEAL REFLUX DISEASE: ICD-10-CM

## 2024-12-02 DIAGNOSIS — N95.1 MENOPAUSAL SYMPTOMS: ICD-10-CM

## 2024-12-02 DIAGNOSIS — I10 ESSENTIAL HYPERTENSION: ICD-10-CM

## 2024-12-03 RX ORDER — OMEPRAZOLE 20 MG/1
CAPSULE, DELAYED RELEASE ORAL
Qty: 90 CAPSULE | Refills: 1 | Status: SHIPPED | OUTPATIENT
Start: 2024-12-03

## 2024-12-03 RX ORDER — ESTRADIOL 0.5 MG/1
0.5 TABLET ORAL
Qty: 90 TABLET | Refills: 0 | Status: SHIPPED | OUTPATIENT
Start: 2024-12-03

## 2024-12-03 RX ORDER — AMLODIPINE BESYLATE 10 MG/1
TABLET ORAL
Qty: 90 TABLET | Refills: 1 | Status: SHIPPED | OUTPATIENT
Start: 2024-12-03

## 2024-12-03 NOTE — TELEPHONE ENCOUNTER
Refill Decision Note   Nicolekieran Websterain  is requesting a refill authorization.  Brief Assessment and Rationale for Refill:  Approve     Medication Therapy Plan:         Comments:     Note composed:9:49 AM 12/03/2024

## 2024-12-03 NOTE — TELEPHONE ENCOUNTER
No care due was identified.  [facility] Embedded Care Due Messages. Reference number: [id number].   12/02/2024 10:05:54 PM CST

## 2024-12-03 NOTE — TELEPHONE ENCOUNTER
Refill Decision Note   Nicolekieran Websterain  is requesting a refill authorization.  Brief Assessment and Rationale for Refill:  Approve     Medication Therapy Plan:         Comments:     Note composed:2:02 PM 12/03/2024

## 2024-12-04 ENCOUNTER — TELEPHONE (OUTPATIENT)
Dept: PRIMARY CARE CLINIC | Facility: CLINIC | Age: 74
End: 2024-12-04
Payer: MEDICARE

## 2024-12-04 NOTE — TELEPHONE ENCOUNTER
----- Message from Radha sent at 12/4/2024 10:34 AM CST -----  Contact: [phone]  Caller is requesting an earlier appointment then we can schedule.  Caller is requesting a message be sent to the provider.    If this is for urgent care symptoms, did you offer other providers at this location, providers at other locations, or [facility]? (yes, no, n/a):  n/a    If this is for the patients physical, did you offer to schedule next available and put on wait list, or to see NP or PA for their physical?  (yes, no, n/a):  n/a    When is the next available appointment with their provider:  12/12/24    Reason for the appointment:  6 mo f/u     Patient preference of timeframe to be scheduled:  December another date    Would the patient like a call back, or a response through their [facility] portal?:   phone    Comments:  patient spouse having an appointment the same date. Patient has been offered to see someone else but she want to be seen by pcp.

## 2024-12-04 NOTE — TELEPHONE ENCOUNTER
----- Message from Radha sent at 12/4/2024 10:34 AM CST -----  Contact: [phone]  Caller is requesting an earlier appointment then we can schedule.  Caller is requesting a message be sent to the provider.    If this is for urgent care symptoms, did you offer other providers at this location, providers at other locations, or [facility]? (yes, no, n/a):  n/a    If this is for the patients physical, did you offer to schedule next available and put on wait list, or to see NP or PA for their physical?  (yes, no, n/a):  n/a    When is the next available appointment with their provider:  12/12/24    Reason for the appointment:  6 mo f/u     Patient preference of timeframe to be scheduled:  December another date    Would the patient like a call back, or a response through their [facility] portal?:   phone    Comments:  patient spouse having an appointment the same date. Patient has been offered to see someone else but she want to be seen by pcp.

## 2024-12-16 ENCOUNTER — OFFICE VISIT (OUTPATIENT)
Dept: PRIMARY CARE CLINIC | Facility: CLINIC | Age: 74
End: 2024-12-16
Payer: MEDICARE

## 2024-12-16 VITALS
WEIGHT: 149.25 LBS | RESPIRATION RATE: 18 BRPM | BODY MASS INDEX: 24.87 KG/M2 | HEART RATE: 77 BPM | OXYGEN SATURATION: 97 % | DIASTOLIC BLOOD PRESSURE: 60 MMHG | HEIGHT: 65 IN | SYSTOLIC BLOOD PRESSURE: 102 MMHG

## 2024-12-16 DIAGNOSIS — F41.8 DEPRESSION WITH ANXIETY: ICD-10-CM

## 2024-12-16 DIAGNOSIS — Z23 NEED FOR COVID-19 VACCINE: ICD-10-CM

## 2024-12-16 DIAGNOSIS — R73.03 PRE-DIABETES: ICD-10-CM

## 2024-12-16 DIAGNOSIS — Z12.31 ENCOUNTER FOR SCREENING MAMMOGRAM FOR BREAST CANCER: ICD-10-CM

## 2024-12-16 DIAGNOSIS — N18.31 CHRONIC KIDNEY DISEASE, STAGE 3A: ICD-10-CM

## 2024-12-16 DIAGNOSIS — I10 ESSENTIAL HYPERTENSION: Primary | ICD-10-CM

## 2024-12-16 DIAGNOSIS — D64.9 NORMOCYTIC ANEMIA: ICD-10-CM

## 2024-12-16 LAB
ALBUMIN/CREAT UR: 12.9 UG/MG (ref 0–30)
CREAT UR-MCNC: 217 MG/DL (ref 15–325)
MICROALBUMIN UR DL<=1MG/L-MCNC: 28 UG/ML

## 2024-12-16 PROCEDURE — G2211 COMPLEX E/M VISIT ADD ON: HCPCS | Mod: S$GLB,,, | Performed by: INTERNAL MEDICINE

## 2024-12-16 PROCEDURE — 1159F MED LIST DOCD IN RCRD: CPT | Mod: CPTII,S$GLB,, | Performed by: INTERNAL MEDICINE

## 2024-12-16 PROCEDURE — 82043 UR ALBUMIN QUANTITATIVE: CPT | Performed by: INTERNAL MEDICINE

## 2024-12-16 PROCEDURE — 3074F SYST BP LT 130 MM HG: CPT | Mod: CPTII,S$GLB,, | Performed by: INTERNAL MEDICINE

## 2024-12-16 PROCEDURE — 4010F ACE/ARB THERAPY RXD/TAKEN: CPT | Mod: CPTII,S$GLB,, | Performed by: INTERNAL MEDICINE

## 2024-12-16 PROCEDURE — 99999 PR PBB SHADOW E&M-EST. PATIENT-LVL IV: CPT | Mod: PBBFAC,,, | Performed by: INTERNAL MEDICINE

## 2024-12-16 PROCEDURE — 1126F AMNT PAIN NOTED NONE PRSNT: CPT | Mod: CPTII,S$GLB,, | Performed by: INTERNAL MEDICINE

## 2024-12-16 PROCEDURE — 1101F PT FALLS ASSESS-DOCD LE1/YR: CPT | Mod: CPTII,S$GLB,, | Performed by: INTERNAL MEDICINE

## 2024-12-16 PROCEDURE — 1160F RVW MEDS BY RX/DR IN RCRD: CPT | Mod: CPTII,S$GLB,, | Performed by: INTERNAL MEDICINE

## 2024-12-16 PROCEDURE — 3078F DIAST BP <80 MM HG: CPT | Mod: CPTII,S$GLB,, | Performed by: INTERNAL MEDICINE

## 2024-12-16 PROCEDURE — 99214 OFFICE O/P EST MOD 30 MIN: CPT | Mod: S$GLB,,, | Performed by: INTERNAL MEDICINE

## 2024-12-16 PROCEDURE — 3288F FALL RISK ASSESSMENT DOCD: CPT | Mod: CPTII,S$GLB,, | Performed by: INTERNAL MEDICINE

## 2024-12-16 NOTE — PROGRESS NOTES
Subjective     Patient ID: Nicole Jacques is a 74 y.o. female.    Chief Complaint: Follow-up    Last seen 6 months ago for annual physical. Returns for scheduled f/u chronic medical conditions. Evaluated by Neurology for MCI in May - started Primidone, weaning Alprazolam 2 mg - 1/4 tab once daily now. Scheduled to see Psychiatry in one month. She is reluctant to stop Alprazolam, inquiring if Lexapro 20 can be increased (it is maximized). She had not requested any urgent visits for mood disturbance, I suspect there is little change on the lower dose of Xanax and encouraged her to discontinue it as planned.    PMH: , misc x 1.    Hypertension.  Pre-Diabetes, HbA1c up to 6.3, 5.8% Dec. '23, LDL 95.  GERD, EGD  only showed hiatal hernia.  Depression with Anxiety.    Lumbar Spondylosis.  Mild OA Knees.   Sigmoid Diverticulosis.   Microvascular White Matter disease.   Elevated Total Protein level.   Mild CKD, GFR 50's.    Colonoscopy 3/21 normal, repeat 10 yrs. Mammogram normal . Pelvic exam . BMD normal 10/23. Eye exam  outside [facility]. Vaccines reviewed.     SOCIAL: No tobacco,  smokes. Social alcohol.  with 2 children. 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     PSH: BTL, Hysterectomy. Left knee scope. Appendectomy. Bilateral Cataract extractions and Blepharoplasty.     FMH: Father  age 81 with bladder cancer and dementia. Mother  age 78 with hypertension and renal failure, and CHF, possible osteoporosis. Diabetes in grandmothers. Anxiety - mother, sister. Colon cancer in maternal uncle.    Allergies: Bactrim.    Medications: list reviewed and reconciled.               Review of Systems   Constitutional:  Negative for activity change, appetite change, fatigue, fever and unexpected weight change.   Respiratory:  Negative for cough and shortness of breath.    Cardiovascular:  Negative for chest pain, palpitations and leg swelling.   Gastrointestinal:  Negative for  "abdominal pain, diarrhea, nausea and vomiting.   Genitourinary:  Negative for dysuria and frequency.   Neurological:  Negative for dizziness, syncope, weakness and headaches.   Psychiatric/Behavioral:  Positive for dysphoric mood. Negative for suicidal ideas. The patient is nervous/anxious.         She feels she was unfairly judged by Neurology.           Objective   Vitals:    12/16/24 1254   BP: 102/60   BP Location: Right arm   Patient Position: Sitting   Pulse: 77   Resp: 18   SpO2: 97%   Weight: 67.7 kg (149 lb 4 oz)     Unchanged.    Height: 5' 5" (1.651 m)      Physical Exam  Constitutional:       General: She is not in acute distress.     Appearance: She is not ill-appearing.   Cardiovascular:      Rate and Rhythm: Normal rate and regular rhythm.      Heart sounds: Normal heart sounds.   Pulmonary:      Effort: Pulmonary effort is normal. No respiratory distress.      Breath sounds: Normal breath sounds. No wheezing, rhonchi or rales.   Musculoskeletal:         General: Normal range of motion.      Right lower leg: No edema.      Left lower leg: No edema.   Skin:     General: Skin is warm and dry.   Neurological:      Mental Status: She is alert.      Cranial Nerves: No cranial nerve deficit.      Coordination: Coordination normal.      Gait: Gait normal.   Psychiatric:         Mood and Affect: Mood is anxious. Affect is angry.         Speech: Speech normal.         Behavior: Behavior normal.         Thought Content: Thought content normal.          Assessment and Plan     1. Essential hypertension controlled  -     Lipid Panel; Future; Expected date: 12/16/2024  -     continue Amlodipine 10, Losartan 100 recently refilled.    2. Pre-diabetes  -     Hemoglobin A1C; Future; Expected date: 12/16/2024    3. Chronic kidney disease, stage 3a  -     Basic Metabolic Panel; Future; Expected date: 12/16/2024  -     Microalbumin/Creatinine Ratio, Urine    4. Normocytic anemia  -     CBC Without Differential; Future; " Expected date: 12/16/2024  -     Iron and TIBC; Future; Expected date: 12/16/2024    5. Depression with anxiety        -     continue Lexapro 20 mg daily, continue weaning off Alprazolam, Psychiatry consult as scheduled.    6. Encounter for screening mammogram for breast cancer        -      Mammogram is ordered and scheduled.     7. Need for COVID-19 vaccine - COVID booster today.       Follow up in about 6 months (around 6/16/2025) for Annual Physical..

## 2024-12-19 ENCOUNTER — LAB VISIT (OUTPATIENT)
Dept: LAB | Facility: HOSPITAL | Age: 74
End: 2024-12-19
Attending: INTERNAL MEDICINE
Payer: MEDICARE

## 2024-12-19 DIAGNOSIS — D64.9 NORMOCYTIC ANEMIA: ICD-10-CM

## 2024-12-19 DIAGNOSIS — I10 ESSENTIAL HYPERTENSION: ICD-10-CM

## 2024-12-19 DIAGNOSIS — N18.31 CHRONIC KIDNEY DISEASE, STAGE 3A: ICD-10-CM

## 2024-12-19 DIAGNOSIS — R73.03 PRE-DIABETES: ICD-10-CM

## 2024-12-19 LAB
ANION GAP SERPL CALC-SCNC: 9 MMOL/L (ref 8–16)
CALCIUM SERPL-MCNC: 9.8 MG/DL (ref 8.7–10.5)
CHLORIDE SERPL-SCNC: 100 MMOL/L (ref 95–110)
CHOLEST SERPL-MCNC: 180 MG/DL (ref 120–199)
CHOLEST/HDLC SERPL: 3 {RATIO} (ref 2–5)
CO2 SERPL-SCNC: 29 MMOL/L (ref 23–29)
CREAT SERPL-MCNC: 0.97 MG/DL (ref 0.5–1.4)
ERYTHROCYTE [DISTWIDTH] IN BLOOD BY AUTOMATED COUNT: 12.3 % (ref 11.5–14.5)
EST. GFR  (NO RACE VARIABLE): >60 ML/MIN/1.73 M^2
ESTIMATED AVG GLUCOSE: 126 MG/DL (ref 68–131)
GLUCOSE SERPL-MCNC: 131 MG/DL (ref 70–110)
HBA1C MFR BLD: 6 % (ref 4–5.6)
HCT VFR BLD AUTO: 37.5 % (ref 37–48.5)
HDLC SERPL-MCNC: 60 MG/DL (ref 40–75)
HDLC SERPL: 33.3 % (ref 20–50)
HGB BLD-MCNC: 12.6 G/DL (ref 12–16)
IRON SERPL-MCNC: 87 UG/DL (ref 30–160)
LDLC SERPL CALC-MCNC: 107.2 MG/DL (ref 63–159)
MCH RBC QN AUTO: 31.3 PG (ref 27–31)
MCHC RBC AUTO-ENTMCNC: 33.6 G/DL (ref 32–36)
MCV RBC AUTO: 93 FL (ref 82–98)
NONHDLC SERPL-MCNC: 120 MG/DL
PLATELET # BLD AUTO: 246 K/UL (ref 150–450)
PMV BLD AUTO: 11.3 FL (ref 9.2–12.9)
POTASSIUM SERPL-SCNC: 4.1 MMOL/L (ref 3.5–5.1)
RBC # BLD AUTO: 4.03 M/UL (ref 4–5.4)
SATURATED IRON: 25 % (ref 20–50)
SODIUM SERPL-SCNC: 138 MMOL/L (ref 136–145)
TOTAL IRON BINDING CAPACITY: 352 UG/DL (ref 250–450)
TRANSFERRIN SERPL-MCNC: 238 MG/DL (ref 200–375)
TRIGL SERPL-MCNC: 64 MG/DL (ref 30–150)
UUN UR-MCNC: 20 MG/DL (ref 7–17)
WBC # BLD AUTO: 3.3 K/UL (ref 3.9–12.7)

## 2024-12-19 PROCEDURE — 83036 HEMOGLOBIN GLYCOSYLATED A1C: CPT | Performed by: INTERNAL MEDICINE

## 2024-12-19 PROCEDURE — 36415 COLL VENOUS BLD VENIPUNCTURE: CPT | Mod: PN | Performed by: INTERNAL MEDICINE

## 2024-12-19 PROCEDURE — 80048 BASIC METABOLIC PNL TOTAL CA: CPT | Mod: PN | Performed by: INTERNAL MEDICINE

## 2024-12-19 PROCEDURE — 85027 COMPLETE CBC AUTOMATED: CPT | Mod: PN | Performed by: INTERNAL MEDICINE

## 2024-12-19 PROCEDURE — 80061 LIPID PANEL: CPT | Performed by: INTERNAL MEDICINE

## 2024-12-19 PROCEDURE — 83540 ASSAY OF IRON: CPT | Mod: PN | Performed by: INTERNAL MEDICINE

## 2024-12-20 ENCOUNTER — PATIENT MESSAGE (OUTPATIENT)
Dept: PRIMARY CARE CLINIC | Facility: CLINIC | Age: 74
End: 2024-12-20
Payer: MEDICARE

## 2024-12-30 ENCOUNTER — HOSPITAL ENCOUNTER (OUTPATIENT)
Dept: RADIOLOGY | Facility: HOSPITAL | Age: 74
Discharge: HOME | End: 2024-12-30
Attending: INTERNAL MEDICINE
Payer: MEDICARE

## 2024-12-30 DIAGNOSIS — Z12.31 ENCOUNTER FOR SCREENING MAMMOGRAM FOR BREAST CANCER: ICD-10-CM

## 2024-12-30 PROCEDURE — 77067 SCR MAMMO BI INCL CAD: CPT | Mod: 26,,, | Performed by: RADIOLOGY

## 2024-12-30 PROCEDURE — 77063 BREAST TOMOSYNTHESIS BI: CPT | Mod: TC

## 2024-12-30 PROCEDURE — 77063 BREAST TOMOSYNTHESIS BI: CPT | Mod: 26,,, | Performed by: RADIOLOGY

## 2025-01-22 ENCOUNTER — PATIENT MESSAGE (OUTPATIENT)
Dept: PSYCHIATRY | Facility: CLINIC | Age: 75
End: 2025-01-22
Payer: MEDICARE

## 2025-01-28 DIAGNOSIS — R90.82 WHITE MATTER DISEASE OF BRAIN DUE TO ISCHEMIA: ICD-10-CM

## 2025-01-28 DIAGNOSIS — I99.8 WHITE MATTER DISEASE OF BRAIN DUE TO ISCHEMIA: ICD-10-CM

## 2025-01-28 RX ORDER — PRAVASTATIN SODIUM 10 MG/1
TABLET ORAL
Qty: 90 TABLET | Refills: 1 | Status: SHIPPED | OUTPATIENT
Start: 2025-01-28

## 2025-01-28 NOTE — TELEPHONE ENCOUNTER
No care due was identified.  [facility] Embedded Care Due Messages. Reference number: [id number].   1/28/2025 3:43:27 AM CST

## 2025-01-28 NOTE — TELEPHONE ENCOUNTER
Refill Decision Note   Nicole Sawyer  is requesting a refill authorization.  Brief Assessment and Rationale for Refill:  Approve     Medication Therapy Plan:         Comments:     Note composed:11:38 AM 01/28/2025

## 2025-02-05 ENCOUNTER — OFFICE VISIT (OUTPATIENT)
Dept: PSYCHIATRY | Facility: CLINIC | Age: 75
End: 2025-02-05
Payer: MEDICARE

## 2025-02-05 VITALS
SYSTOLIC BLOOD PRESSURE: 130 MMHG | HEART RATE: 78 BPM | WEIGHT: 154 LBS | BODY MASS INDEX: 25.63 KG/M2 | DIASTOLIC BLOOD PRESSURE: 65 MMHG

## 2025-02-05 DIAGNOSIS — F41.1 GAD (GENERALIZED ANXIETY DISORDER): Primary | ICD-10-CM

## 2025-02-05 PROCEDURE — 3078F DIAST BP <80 MM HG: CPT | Mod: CPTII,S$GLB,, | Performed by: FAMILY MEDICINE

## 2025-02-05 PROCEDURE — 3075F SYST BP GE 130 - 139MM HG: CPT | Mod: CPTII,S$GLB,, | Performed by: FAMILY MEDICINE

## 2025-02-05 PROCEDURE — 1159F MED LIST DOCD IN RCRD: CPT | Mod: CPTII,S$GLB,, | Performed by: FAMILY MEDICINE

## 2025-02-05 PROCEDURE — 99999 PR PBB SHADOW E&M-EST. PATIENT-LVL III: CPT | Mod: PBBFAC,,, | Performed by: FAMILY MEDICINE

## 2025-02-05 PROCEDURE — 1160F RVW MEDS BY RX/DR IN RCRD: CPT | Mod: CPTII,S$GLB,, | Performed by: FAMILY MEDICINE

## 2025-02-05 PROCEDURE — 99204 OFFICE O/P NEW MOD 45 MIN: CPT | Mod: S$GLB,,, | Performed by: FAMILY MEDICINE

## 2025-02-05 RX ORDER — BUSPIRONE HYDROCHLORIDE 5 MG/1
5 TABLET ORAL 2 TIMES DAILY
Qty: 60 TABLET | Refills: 11 | Status: SHIPPED | OUTPATIENT
Start: 2025-02-05 | End: 2026-02-05

## 2025-02-05 NOTE — PROGRESS NOTES
"Outpatient Psychiatry Initial Visit ([facility])    2/5/2025    Nicole Jacques, a 75 y.o. female, presenting for initial evaluation visit. Met with patient.    Reason for Encounter: Referral from PCP . Patient complains of needding medication management for anxiety while tapering of Xanax.     History of Present Illness:   Patient present to clinic today with medical history of GERD, Pre-diabetes, Benign essential tremors and hypertension. Mental health history of Major depressive disorder, recurrent, mild and Generalized anxiety disorder. Patient states she is currently weaning off of Xanax and states she went to a Neurologist who told her to discontinue off the medication because she has been on it for years and may cause long term effects. Patient admits she has been having anxiety issues for years which has lead her to take Xanax for over 10 years. Currently patient has been taking Xanax 2 mg once a day and now she takes 1/4 of a tab. Patient has been on Lexapro 20 mg for 8 years and states it was working but now since she is weaning of the Xanax it is not helping as much. States she does not know where the anxiety stems from but states her mother and sister both have anxiety. Patient has benign essential tremors that she feels is due to her anxiety and feels that it is also hereditary because her mother and sister suffer from that as well.  Patient feels she doesn't have the energy to do things like she use to such as going to the fitness center to work out, garden and socialize. She feels she has a good support system and her and her  are living a good healthy life. She lives for her family and loves her grandchild.     Mood: "OK"   Anxiety: 7/10  Depression: 7/10      Standardized Screenings tools:   PHQ9: Mild depression-9  ROSALIA- 7: Mild Anxiety- 7        Stressors:  Life, social anxiety     History:     Past Psychiatric History:   Previous therapy: no  Previous psychiatric treatment and medication trials: " yes - Xanax   Previous psychiatric hospitalizations: no  Previous diagnoses: yes - Generalized anxiety disorder, depression   Previous suicide attempts: no  History of violence: no  Currently in treatment with .  Suicidal Ideation: Denies   Auditory Hallucination:Denies  Visual Hallucination:Denies  Education: some college    Social History:  Housing: House   Lives with:    Marital status:  54 years   Children: son and a daughter   Education: some college   Special Ed:Denies  Legal:Denies  Employment: retired   Access to gun:Denies  Hx of abuse:Denies    Substance Abuse History:  Recreational drugs: Denies  Alcohol: 2 times a year   Tobacco use: Denies  Rehab:Denies    Family Hx: Mom- anxiety     Neuro Hx  Seizure:Denies  Head trauma/TBI:Denies      Review Of Systems:     Medical Review Of Systems:  Pertinent positives noted in HPI    Psychiatric Review Of Systems:  Sleep: no states she hasn't been sleeping well since off the Xanax   Appetite changes: yes appetite up and down   Weight changes: no  Energy: no  Anhedonia no  Somatic symptoms: no  Anxiety/panic: yes  Guilty/hopeless: yes  Self-injurious behavior/risky behavior: no  Any drugs: no  Alcohol: no       Current Evaluation:       Mental Status Evaluation:  Appearance:  unremarkable, age appropriate, casually dressed, well dressed, neatly groomed   Behavior:  normal, cooperative   Speech:  no latency; no press   Mood:  anxious   Affect:  anxious   Thought Process:  normal and logical   Thought Content:  normal, no suicidality, no homicidality, delusions, or paranoia   Sensorium:  grossly intact, person, place, situation, time/date, day of week, month of year, year   Cognition:  grossly intact, fund of knowledge intact and appropriate to age and level of education, and memory > 3 objects at five mins   Insight:  limited awareness of illness   Judgment:  behavior is adequate to circumstances, age appropriate     Physical/Somatic Complaints   The  patient lists: no physical complaints.    Constitutional  unremarkable, age appropriate, casually dressed, well dressed       Laboratory Data  No visits with results within 1 Month(s) from this visit.   Latest known visit with results is:   Lab Visit on 12/19/2024   Component Date Value Ref Range Status    WBC 12/19/2024 3.30 (L)  3.90 - 12.70 K/uL Final    RBC 12/19/2024 4.03  4.00 - 5.40 M/uL Final    Hemoglobin 12/19/2024 12.6  12.0 - 16.0 g/dL Final    Hematocrit 12/19/2024 37.5  37.0 - 48.5 % Final    MCV 12/19/2024 93  82 - 98 fL Final    MCH 12/19/2024 31.3 (H)  27.0 - 31.0 pg Final    MCHC 12/19/2024 33.6  32.0 - 36.0 g/dL Final    RDW 12/19/2024 12.3  11.5 - 14.5 % Final    Platelets 12/19/2024 246  150 - 450 K/uL Final    MPV 12/19/2024 11.3  9.2 - 12.9 fL Final    Sodium 12/19/2024 138  136 - 145 mmol/L Final    Potassium 12/19/2024 4.1  3.5 - 5.1 mmol/L Final    Chloride 12/19/2024 100  95 - 110 mmol/L Final    CO2 12/19/2024 29  23 - 29 mmol/L Final    Glucose 12/19/2024 131 (H)  70 - 110 mg/dL Final    BUN 12/19/2024 20 (H)  7 - 17 mg/dL Final    Creatinine 12/19/2024 0.97  0.50 - 1.40 mg/dL Final    Calcium 12/19/2024 9.8  8.7 - 10.5 mg/dL Final    Anion Gap 12/19/2024 9  8 - 16 mmol/L Final    eGFR 12/19/2024 >60.0  >60 mL/min/1.73 m^2 Final    Cholesterol 12/19/2024 180  120 - 199 mg/dL Final    Triglycerides 12/19/2024 64  30 - 150 mg/dL Final    HDL 12/19/2024 60  40 - 75 mg/dL Final    LDL Cholesterol 12/19/2024 107.2  63.0 - 159.0 mg/dL Final    HDL/Cholesterol Ratio 12/19/2024 33.3  20.0 - 50.0 % Final    Total Cholesterol/HDL Ratio 12/19/2024 3.0  2.0 - 5.0 Final    Non-HDL Cholesterol 12/19/2024 120  mg/dL Final    Hemoglobin A1C 12/19/2024 6.0 (H)  4.0 - 5.6 % Final    Estimated Avg Glucose 12/19/2024 126  68 - 131 mg/dL Final    Iron 12/19/2024 87  30 - 160 ug/dL Final    Transferrin 12/19/2024 238  200 - 375 mg/dL Final    TIBC 12/19/2024 352  250 - 450 ug/dL Final    Saturated Iron  12/19/2024 25  20 - 50 % Final         Medications  Outpatient Encounter Medications as of 2/5/2025   Medication Sig Dispense Refill    ALPRAZolam (XANAX) 2 MG Tab Take 1 tablet (2 mg total) by mouth daily as needed (Anxiety.). as needed for anxiety. 30 tablet 5    amLODIPine (NORVASC) 10 MG tablet TAKE 1 TABLET BY MOUTH ONCE  DAILY 90 tablet 1    ascorbic acid, vitamin C, (VITAMIN C) 1000 MG tablet Take 2 tablets by mouth every morning. Takes two 1000 mg tablets every morning      aspirin (ECOTRIN) 81 MG EC tablet Take 81 mg by mouth every morning.      co-enzyme Q-10 30 mg capsule Take 30 mg by mouth every morning.       EScitalopram oxalate (LEXAPRO) 20 MG tablet TAKE 1 TABLET BY MOUTH ONCE  DAILY 90 tablet 2    estradioL (ESTRACE) 0.01 % (0.1 mg/gram) vaginal cream Place 1 g vaginally 3 (three) times a week. 42 g 3    estradioL (ESTRACE) 0.5 MG tablet TAKE 1 TABLET BY MOUTH ONCE  DAILY 90 tablet 0    losartan (COZAAR) 100 MG tablet Take 1 tablet (100 mg total) by mouth once daily. 90 tablet 2    LUBRICANT EYE DROPS, GLYC-PG, 1-0.3 % Drop Place 1 drop into both eyes as needed.       MULTIVITAMIN (MULTIPLE VITAMIN ESSENTIAL ORAL) Take 1 tablet by mouth every morning.       omeprazole (PRILOSEC) 20 MG capsule TAKE 1 CAPSULE BY MOUTH ONCE  DAILY 90 capsule 1    pravastatin (PRAVACHOL) 10 MG tablet TAKE 1 TABLET BY MOUTH ONCE  DAILY FOR VASCULAR HEALTH 90 tablet 1    primidone (MYSOLINE) 50 MG Tab Take 1 tablet (50 mg total) by mouth once daily. 30 tablet 11    VITAMIN D3 5,000 unit Tab Take 5,000 Units by mouth every morning.       [DISCONTINUED] pravastatin (PRAVACHOL) 10 MG tablet TAKE 1 TABLET BY MOUTH ONCE  DAILY FOR VASCULAR HEALTH 90 tablet 0     No facility-administered encounter medications on file as of 2/5/2025.           Assessment - Diagnosis - Goals:     Impression: Nicole THOMAS Jacques, a 75 y.o. female, presenting for initial evaluation visit for medication management of anxiety and  depression.    Diagnosis: Major depressive disorder, recurrent, mild  Generalized anxiety disorder.     Strengths and Liabilities: Strength: Patient accepts guidance/feedback, Strength: Patient is expressive/articulate., Strength: Patient is motivated for change., Liability: Patient lacks coping skills.    Treatment Goals:    Anxiety: acquiring relapse prevention skills, reducing negative automatic thoughts, reducing physical symptoms of anxiety, and reducing time spent worrying (<30 minutes/day)  Depression: acquiring relapse prevention skills, increasing energy, increasing motivation, reducing excessive guilt, reducing fatigue, and reducing negative automatic thoughts    Treatment Plan/Recommendations:   Medication Management: Continue current medications. The risks and benefits of medication were discussed with the patient.      Start Buspar 5 mg twice a day  Continue Lexapro 20 mg daily  Continue to taper off Xanax and monitor for signs and symptoms of tapering off Benzo. Patient is aware and will notify clinic and go to her nearest ER.   Discussed diagnosis, risks and benefits of proposed treatment above vs alternative treatments vs no treatment, and potential side effects of these treatments, and the inherent unpredicatbility of individual response to treatment.The patient expresses understanding and gives informed consent to pursue treatment at this time believing that the potential benefits outweight the potential risks. Patient has no other questions. Risks/adverse effects discussed at this time including but not limited to: GI side effects, sexual dysfunction, activation vs sedation, triggering of suicidal thoughts, and serotonin syndrome.  I discussed with the patient the risks of Extrapyramidal Side Effects (dystonia, akathisia, parkinsonism), Metabolic syndrome (inlcuding Hyperglycemia, hyperlipidemia), Neuroleptic Malignant syndrome (fever, muscle rigidity, autonomic instability), Orthostatic  hypotension, Tardive Dyskinesia with antipsychotic use.  Patient voices understanding and agreement with this plan  Provided crisis numbers  Encouraged patient to keep future appointments.  Instructed patient to call or message with questions  In the event of an emergency, including suicidal ideation, patient was advised to go to the emergency room      Return to Clinic: 6 weeks    Total time: 47 minutes with more than 50% of time spent counseling and/or coordinating care.  (which included pts differential diagnosis and prognosis for psychiatric conditions, risks, benefits of treatments, instructions and adherence to treatment plan, risk reduction, reviewing current psychiatric medication regimen, medical problems and social stressors. In addtion to possible discussion with other healthcare provider/s)    Lori Muller  PMHNP

## 2025-02-19 ENCOUNTER — PATIENT MESSAGE (OUTPATIENT)
Dept: UROGYNECOLOGY | Facility: CLINIC | Age: 75
End: 2025-02-19

## 2025-02-19 ENCOUNTER — OFFICE VISIT (OUTPATIENT)
Dept: UROGYNECOLOGY | Facility: CLINIC | Age: 75
End: 2025-02-19
Payer: MEDICARE

## 2025-02-19 ENCOUNTER — TELEPHONE (OUTPATIENT)
Dept: UROGYNECOLOGY | Facility: CLINIC | Age: 75
End: 2025-02-19

## 2025-02-19 VITALS
SYSTOLIC BLOOD PRESSURE: 126 MMHG | WEIGHT: 155.44 LBS | DIASTOLIC BLOOD PRESSURE: 70 MMHG | BODY MASS INDEX: 25.9 KG/M2 | HEART RATE: 59 BPM | HEIGHT: 65 IN

## 2025-02-19 DIAGNOSIS — N39.41 URGE INCONTINENCE: Primary | ICD-10-CM

## 2025-02-19 DIAGNOSIS — N81.11 MIDLINE CYSTOCELE: ICD-10-CM

## 2025-02-19 DIAGNOSIS — N95.2 VAGINAL ATROPHY: ICD-10-CM

## 2025-02-19 RX ORDER — MIRABEGRON 50 MG/1
50 TABLET, FILM COATED, EXTENDED RELEASE ORAL DAILY
Qty: 30 TABLET | Refills: 11 | Status: SHIPPED | OUTPATIENT
Start: 2025-02-19 | End: 2026-02-19

## 2025-02-19 RX ORDER — VIBEGRON 75 MG/1
75 TABLET, FILM COATED ORAL DAILY
Qty: 30 TABLET | Refills: 11 | Status: SHIPPED | OUTPATIENT
Start: 2025-02-19 | End: 2025-02-19

## 2025-02-19 NOTE — TELEPHONE ENCOUNTER
Contacted [facility] pharmacy and was informed the Gemtesa cost $300, co pay maybe included.   Sent pt a detailed portal message along with form to read over.

## 2025-02-19 NOTE — PROGRESS NOTES
Urogyn follow up  02/19/2025  .  [facility]  [address]  [facility] [id number]    Nicolekieran Jacques  [id number]  1950      Nicolekieran Jacques is a 75 y.o. here for a urogyn follow up.    Last HPI from 08/09/2023  1)  UI:  (--) ROCHELLE < (+) UUI  X 5years.  (+) pads:3/day, usually moderate wetness  Daytime frequency: Q 3-4 hours.  Nocturia: Yes: 3-4 night.  Does not limit fluids prior to bedtime. (--) dysuria,  (--) hematuria,  (--) frequent UTIs.  (+) complete bladder emptying. Has tried oxybutynin in the past-- did not tolerate due to drying effects (did help with urgency/ frequency)     2)  POP:  Absent. Symptoms:(--)    (--) vaginal bleeding. (--) vaginal discharge. (+) sexually active.  (--) dyspareunia.   (+)  Vaginal dryness.  (+) vaginal estrogen use.   POP-Q:  Aa -1; Ba -1; C -7; Ap -3; Bp -3; D n/a.  Genital hiatus 3, perineal body 2 total vaginal length 9   Pvr 5 mL     3)  BM:  (--) constipation/straining.  (--) chronic diarrhea. (--) hematochezia.  (--) fecal incontinence.  (--) fecal smearing/urgency.  (+) complete evacuation.  Takes magnesium supplement    Changes from last visit:  1)  Mixed urinary incontinence, urge > stress:    --voiding every 2 hours during the day and 2/night. Does not always limit fluids prior to bedtime.   --using 2-3 pads/ day with severe wetness.    2)vaginal atrophy  --using vaginal estrogen cream      3)midline cystocele stage 2  --asymtpomatic  --pvr 5 mL          Past Medical History:   Diagnosis Date    Anxiety     Cataract     Depression     Family history of colon cancer     GERD (gastroesophageal reflux disease)     Hypertension     Lumbar spondylosis     Lumbar spondylosis     Overactive bladder     Urge urinary incontinence        Past Surgical History:   Procedure Laterality Date    APPENDECTOMY      CATARACT EXTRACTION W/  INTRAOCULAR LENS IMPLANT Left 06/03/2014    Dr Drake    CATARACT EXTRACTION W/  INTRAOCULAR LENS IMPLANT Right 07/07/2014     COLONOSCOPY N/A 11/02/2015    Procedure: COLONOSCOPY;  Surgeon: Desmond Casillas MD;  Location: [facility] (4TH FLR);  Service: Endoscopy;  Laterality: N/A;    COLONOSCOPY N/A 03/16/2021    Procedure: COLONOSCOPY;  Surgeon: Desmond Casillas MD;  Location: [facility] (4TH FLR);  Service: Endoscopy;  Laterality: N/A;  covid test 3/13 primary care, instructions emailed-KPvt    EYE SURGERY      HYSTERECTOMY      partial    KNEE SURGERY Left     TUBAL LIGATION         Family History   Problem Relation Name Age of Onset    Diabetes Paternal Grandmother      Diabetes Maternal Grandmother      Dementia Father      Cancer Father          bladder    Basal cell carcinoma Father      Hypertension Mother      Kidney disease Mother      Heart disease Mother      Diabetes Brother      Hypertension Brother      Hypertension Sister      Diabetes Sister      Hypertension Sister      Breast cancer Sister  54        recurrence at age 64    Diabetes Sister      No Known Problems Daughter Sarai     No Known Problems Son Andrews     Cancer Maternal Uncle          colon cancer    Amblyopia Neg Hx      Blindness Neg Hx      Cataracts Neg Hx      Glaucoma Neg Hx      Macular degeneration Neg Hx      Retinal detachment Neg Hx      Strabismus Neg Hx      Melanoma Neg Hx         Social History     Socioeconomic History    Marital status:    Tobacco Use    Smoking status: Never     Passive exposure: Yes    Smokeless tobacco: Never   Substance and Sexual Activity    Alcohol use: Yes     Comment: Social.    Drug use: No    Sexual activity: Yes     Partners: Male     Comment: , together x 54 years   Other Topics Concern    Are you pregnant or think you may be? No    Breast-feeding No     Social Drivers of Health     Financial Resource Strain: Low Risk  (4/29/2024)    Overall Financial Resource Strain (CARDIA)     Difficulty of Paying Living Expenses: Not hard at all   Food Insecurity: No Food Insecurity (4/29/2024)    Hunger Vital  "Sign     Worried About Running Out of Food in the Last Year: Never true     Ran Out of Food in the Last Year: Never true   Transportation Needs: No Transportation Needs (4/29/2024)    PRAPARE - Transportation     Lack of Transportation (Medical): No     Lack of Transportation (Non-Medical): No   Physical Activity: Sufficiently Active (4/29/2024)    Exercise Vital Sign     Days of Exercise per Week: 3 days     Minutes of Exercise per Session: 60 min   Stress: Stress Concern Present (4/29/2024)    [employer] [employer] - Occupational Stress Questionnaire     Feeling of Stress : To some extent   Housing Stability: Unknown (4/29/2024)    Housing Stability Vital Sign     Unable to Pay for Housing in the Last Year: No       Current Medications[1]    Review of patient's allergies indicates:   Allergen Reactions    Sulfamethoxazole-trimethoprim Nausea And Vomiting and Other (See Comments)     Other reaction(s): Migraine       Well woman:  Pap test: 06/2018 normal no ecc.  History of abnormal paps: No.  History of STIs:  No  Mammogram: Date of last: 12/2022.  Result: Normal  Colonoscopy: Date of last: 03/2021.  Result:  diverticula.  Repeat due:  10 years.    DEXA:  Date of last: 12/2015.  Result:   Elevated BMD of the lumbar spine.       Recommendations:  1) Adequate calcium and Vitamin D therapy  2) Appropriate exercise  3) Consider repeat BMD in 4 years.    ROS:  As per HPI.      Exam  /70 (BP Location: Left arm, Patient Position: Sitting)   Pulse (!) 59   Ht 5' 5" (1.651 m)   Wt 70.5 kg (155 lb 6.8 oz)   BMI 25.86 kg/m²   General: alert and oriented, no acute distress  Respiratory: normal respiratory effort  Abd: soft, non-tender, non-distended    Pelvic--deferred    Impression  1. Urge incontinence  vibegron (GEMTESA) 75 mg Tab      2. Vaginal atrophy        3. Midline cystocele          We reviewed the above issues and discussed options for short-term versus long-term management of her " problems.   Plan:   1)  Mixed urinary incontinence, urge > stress:    --Empty bladder every 3 hours.  Empty well: wait a minute, lean forward on toilet.    --Avoid dietary irritants (see sheet).  Keep diary x 3-5 days to determine your irritants.  --KEGELS: do 10 in AM and 10 in PM, holding each x 10 seconds.  When you feel urge to go, STOP, KEGEL, and when urge has passed, then go to bathroom.  Consider PT in future.    --URGE: consider anticholinergic. If you are not improved with behavior changes and estrogen cream-- send me a message through the portal and I will try to get gemtesa or myrbetriq approved   --stopped oxybutynin in the past due to side effects  --trial gemtesa 75 mg daily-- let us know if it is not affodable     2)vaginal atrophy  --continue vaginal estrogen cream every other day     3)midline cystocele stage 2  --asymtpomatic  --pvr 5 mL     4)RTC 2 months for follow up        I spent a total of 20 minutes on the day of the visit.  This includes face to face time and non-face to face time preparing to see the patient (eg, review of tests), obtaining and/or reviewing separately obtained history, documenting clinical information in the electronic or other health record, independently interpreting results and communicating results to the patient/family/caregiver, or care coordinator.     Dinorah Szymanski, FÁTIMAP-BC  [facility]  Division of Female Pelvic Medicine and Reconstructive Surgery  Department of Obstetrics & Gynecology         [1]   Current Outpatient Medications   Medication Sig Dispense Refill    ALPRAZolam (XANAX) 2 MG Tab Take 1 tablet (2 mg total) by mouth daily as needed (Anxiety.). as needed for anxiety. 30 tablet 5    amLODIPine (NORVASC) 10 MG tablet TAKE 1 TABLET BY MOUTH ONCE  DAILY 90 tablet 1    ascorbic acid, vitamin C, (VITAMIN C) 1000 MG tablet Take 2 tablets by mouth every morning. Takes two 1000 mg tablets every morning      aspirin (ECOTRIN) 81 MG EC tablet Take  81 mg by mouth every morning.      busPIRone (BUSPAR) 5 MG Tab Take 1 tablet (5 mg total) by mouth 2 (two) times daily. 60 tablet 11    co-enzyme Q-10 30 mg capsule Take 30 mg by mouth every morning.       EScitalopram oxalate (LEXAPRO) 20 MG tablet TAKE 1 TABLET BY MOUTH ONCE  DAILY 90 tablet 2    estradioL (ESTRACE) 0.01 % (0.1 mg/gram) vaginal cream Place 1 g vaginally 3 (three) times a week. 42 g 3    estradioL (ESTRACE) 0.5 MG tablet TAKE 1 TABLET BY MOUTH ONCE  DAILY 90 tablet 0    losartan (COZAAR) 100 MG tablet Take 1 tablet (100 mg total) by mouth once daily. 90 tablet 2    MULTIVITAMIN (MULTIPLE VITAMIN ESSENTIAL ORAL) Take 1 tablet by mouth every morning.       omeprazole (PRILOSEC) 20 MG capsule TAKE 1 CAPSULE BY MOUTH ONCE  DAILY 90 capsule 1    pravastatin (PRAVACHOL) 10 MG tablet TAKE 1 TABLET BY MOUTH ONCE  DAILY FOR VASCULAR HEALTH 90 tablet 1    primidone (MYSOLINE) 50 MG Tab Take 1 tablet (50 mg total) by mouth once daily. 30 tablet 11    VITAMIN D3 5,000 unit Tab Take 5,000 Units by mouth every morning.       LUBRICANT EYE DROPS, GLYC-PG, 1-0.3 % Drop Place 1 drop into both eyes as needed.       vibegron (GEMTESA) 75 mg Tab Take 1 tablet (75 mg total) by mouth Daily. 30 tablet 11     No current facility-administered medications for this visit.

## 2025-02-19 NOTE — PATIENT INSTRUCTIONS
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  Empty well: wait a minute, lean forward on toilet.    --Avoid dietary irritants (see sheet).  Keep diary x 3-5 days to determine your irritants.  --KEGELS: do 10 in AM and 10 in PM, holding each x 10 seconds.  When you feel urge to go, STOP, KEGEL, and when urge has passed, then go to bathroom.  Consider PT in future.    --URGE: consider anticholinergic. If you are not improved with behavior changes and estrogen cream-- send me a message through the portal and I will try to get gemtesa or myrbetriq approved   --stopped oxybutynin in the past due to side effects  --trial gemtesa 75 mg daily-- let us know if it is not affodable     2)vaginal atrophy  --continue vaginal estrogen cream every other day     3)midline cystocele stage 2  --asymtpomatic  --pvr 5 mL     4)RTC 2 months for follow up

## 2025-02-19 NOTE — TELEPHONE ENCOUNTER
----- Message from Dana sent at 2/19/2025  4:27 PM CST -----  Regarding: Nicole  Who called:Esperanza is the request in detail: Patient say that the prescription for vibegron (GEMTESA) 75 mg Tab was too expensive and want to know if something else can be prescribed please reach out to the patient with informationCan the clinic reply by [facility]?noWould the patient rather a call back or a response via [facility]? CallbackBe call back number:.[phone] Information:

## 2025-03-18 LAB
LEFT EYE DM RETINOPATHY: NEGATIVE
RIGHT EYE DM RETINOPATHY: NEGATIVE

## 2025-03-19 ENCOUNTER — OFFICE VISIT (OUTPATIENT)
Dept: PSYCHIATRY | Facility: CLINIC | Age: 75
End: 2025-03-19
Payer: COMMERCIAL

## 2025-03-19 VITALS
WEIGHT: 147.19 LBS | SYSTOLIC BLOOD PRESSURE: 99 MMHG | DIASTOLIC BLOOD PRESSURE: 64 MMHG | HEART RATE: 76 BPM | BODY MASS INDEX: 24.49 KG/M2

## 2025-03-19 DIAGNOSIS — F41.1 GAD (GENERALIZED ANXIETY DISORDER): ICD-10-CM

## 2025-03-19 DIAGNOSIS — F41.0 GENERALIZED ANXIETY DISORDER WITH PANIC ATTACKS: Primary | ICD-10-CM

## 2025-03-19 DIAGNOSIS — F41.1 GENERALIZED ANXIETY DISORDER WITH PANIC ATTACKS: Primary | ICD-10-CM

## 2025-03-19 PROCEDURE — 99999 PR PBB SHADOW E&M-EST. PATIENT-LVL I: CPT | Mod: PBBFAC,,, | Performed by: FAMILY MEDICINE

## 2025-03-19 PROCEDURE — 3078F DIAST BP <80 MM HG: CPT | Mod: CPTII,S$GLB,, | Performed by: FAMILY MEDICINE

## 2025-03-19 PROCEDURE — 3074F SYST BP LT 130 MM HG: CPT | Mod: CPTII,S$GLB,, | Performed by: FAMILY MEDICINE

## 2025-03-19 PROCEDURE — 99214 OFFICE O/P EST MOD 30 MIN: CPT | Mod: S$GLB,,, | Performed by: FAMILY MEDICINE

## 2025-03-19 RX ORDER — TRAZODONE HYDROCHLORIDE 50 MG/1
50 TABLET ORAL NIGHTLY
Qty: 30 TABLET | Refills: 3 | Status: SHIPPED | OUTPATIENT
Start: 2025-03-19 | End: 2025-07-17

## 2025-03-19 RX ORDER — BUSPIRONE HYDROCHLORIDE 10 MG/1
10 TABLET ORAL 2 TIMES DAILY
Qty: 60 TABLET | Refills: 11 | Status: SHIPPED | OUTPATIENT
Start: 2025-03-19 | End: 2026-03-19

## 2025-03-19 NOTE — PROGRESS NOTES
"Outpatient Psychiatry Follow-Up Visit (MD/WAI)    3/19/2025    Clinical Status of Patient:  Outpatient (Ambulatory)    Chief Complaint:  Nicole Jacques is a 75 y.o. female who presents today for follow-up of depression and anxiety.  Met with patient.      Interval History and Content of Current Session 03/19/2025:    Pt reports today: "I am ok just a little down"    Mood overall is "is down "    Rates depression as 8/10 and anxiety as 10/10 over the past 2 weeks.    Patients mood is anxious, affect appears anxious. Linear and logical, friendly and cooperative, good eye contact.    Denies SI/HI/AVH. Pt reports sleeping well and normal appetite. Denies side effects of medications.    Pt reports taking medications as prescribed and behaving appropriately during interview today. Patient states she has not been taking Xanax since March the 8th stating she thought her PCP prescribed it until May but it wasn't. Since she has been off Xanax complaints of increased tremors, stating she threw up once and has developed an increased sense of smell. Denies any current side effects but states she has noticed that her anxiety has increased at times.   Patient states she loves her life,  and family. States her and her  take walks in [place] some days to help exercise and get out of the house.     Psychotherapy:  Target symptoms: depression, anxiety   Why chosen therapy is appropriate versus another modality: relevant to diagnosis  Outcome monitoring methods: self-report, observation  Therapeutic intervention type: supportive psychotherapy  Topics discussed/themes: building skills sets for symptom management, symptom recognition  The patient's response to the intervention is accepting. The patient's progress toward treatment goals is good.   Duration of intervention: 10 minutes.      Prior visit Patient present to clinic today with medical history of GERD, Pre-diabetes, Benign essential tremors and hypertension. " Mental health history of Major depressive disorder, recurrent, mild and Generalized anxiety disorder. Patient states she is currently weaning off of Xanax and states she went to a Neurologist who told her to discontinue off the medication because she has been on it for years and may cause long term effects. Patient admits she has been having anxiety issues for years which has lead her to take Xanax for over 10 years. Currently patient has been taking Xanax 2 mg once a day and now she takes 1/4 of a tab. Patient has been on Lexapro 20 mg for 8 years and states it was working but now since she is weaning of the Xanax it is not helping as much. States she does not know where the anxiety stems from but states her mother and sister both have anxiety. Patient has benign essential tremors that she feels is due to her anxiety and feels that it is also hereditary because her mother and sister suffer from that as well.  Patient feels she doesn't have the energy to do things like she use to such as going to the fitness center to work out, garden and socialize. She feels she has a good support system and her and her  are living a good healthy life. She lives for her family and loves her grandchild.    :            Review of Systems     ROS    Psychiatric Review Of Systems - Is patient experiencing or having changes in:  sleep: no states not able to fall asleep    appetite: no states she is eating but not eating as much   weight: no  energy/anergy: no  interest/pleasure/anhedonia: no  somatic symptoms: no  libido: no  anxiety/panic: yes states she is anxious with panic attacks   guilty/hopelessness: yes states she feels guilty about being on the medication so long   concentration: yes  S.I.B.s/risky behavior: no  Irritability: yes  Racing thoughts: yes  Impulsive behaviors: no  Paranoia: yes states paranoid to go out side or talk to people compared on how she was before.   AVH: no      Past Medical, Family and Social  History: The patient's past medical, family and social history have been reviewed and updated as appropriate within the electronic medical record - see encounter notes.      Current Medications:   Medication List with Changes/Refills   Current Medications    ALPRAZOLAM (XANAX) 2 MG TAB    Take 1 tablet (2 mg total) by mouth daily as needed (Anxiety.). as needed for anxiety.    AMLODIPINE (NORVASC) 10 MG TABLET    TAKE 1 TABLET BY MOUTH ONCE  DAILY    ASCORBIC ACID, VITAMIN C, (VITAMIN C) 1000 MG TABLET    Take 2 tablets by mouth every morning. Takes two 1000 mg tablets every morning    ASPIRIN (ECOTRIN) 81 MG EC TABLET    Take 81 mg by mouth every morning.    BUSPIRONE (BUSPAR) 5 MG TAB    Take 1 tablet (5 mg total) by mouth 2 (two) times daily.    CO-ENZYME Q-10 30 MG CAPSULE    Take 30 mg by mouth every morning.     ESCITALOPRAM OXALATE (LEXAPRO) 20 MG TABLET    TAKE 1 TABLET BY MOUTH ONCE  DAILY    ESTRADIOL (ESTRACE) 0.01 % (0.1 MG/GRAM) VAGINAL CREAM    Place 1 g vaginally 3 (three) times a week.    ESTRADIOL (ESTRACE) 0.5 MG TABLET    TAKE 1 TABLET BY MOUTH ONCE  DAILY    LOSARTAN (COZAAR) 100 MG TABLET    Take 1 tablet (100 mg total) by mouth once daily.    MIRABEGRON (MYRBETRIQ) 50 MG TB24    Take 1 tablet (50 mg total) by mouth once daily.    MULTIVITAMIN (MULTIPLE VITAMIN ESSENTIAL ORAL)    Take 1 tablet by mouth every morning.     OMEPRAZOLE (PRILOSEC) 20 MG CAPSULE    TAKE 1 CAPSULE BY MOUTH ONCE  DAILY    PRAVASTATIN (PRAVACHOL) 10 MG TABLET    TAKE 1 TABLET BY MOUTH ONCE  DAILY FOR VASCULAR HEALTH    PRIMIDONE (MYSOLINE) 50 MG TAB    Take 1 tablet (50 mg total) by mouth once daily.    VITAMIN D3 5,000 UNIT TAB    Take 5,000 Units by mouth every morning.          Allergies:   Review of patient's allergies indicates:   Allergen Reactions    Sulfamethoxazole-trimethoprim Nausea And Vomiting and Other (See Comments)     Other reaction(s): Migraine         Vitals   Vitals:    03/19/25 1257   BP: 99/64  "  Pulse: 76          Labs/Imaging/Studies:   No results found for this or any previous visit (from the past 48 hours).   No results found for: "PHENYTOIN", "PHENOBARB", "VALPROATE", "CBMZ"    Compliance: yes    Side effects: None    Risk Parameters:  Patient reports no suicidal ideation  Patient reports no homicidal ideation  Patient reports no self-injurious behavior  Patient reports no violent behavior    Exam (detailed: at least 9 elements; comprehensive: all 15 elements)   Constitutional  Vitals:  Most recent vital signs, dated less than 90 days prior to this appointment, were reviewed.   Vitals:    03/19/25 1257   BP: 99/64   Pulse: 76   Weight: 66.7 kg (147 lb 2.5 oz)        General:  unremarkable, age appropriate, casually dressed, well dressed, neatly groomed     Musculoskeletal  Muscle Strength/Tone:  not examined   Gait & Station:  non-ataxic     Psychiatric  Speech:  soft   Mood & Affect:  anxious  congruent and appropriate   Thought Process:  normal and logical   Associations:  intact   Thought Content:  normal, no suicidality, no homicidality, delusions, or paranoia   Insight:  limited awareness of illness   Judgement: behavior is adequate to circumstances, age appropriate   Orientation:  grossly intact, person, place, situation, time/date, day of week, month of year, year   Memory: intact for content of interview   Language: grossly intact   Attention Span & Concentration:  able to focus   Fund of Knowledge:  intact and appropriate to age and level of education     Assessment and Diagnosis   Status/Progress: Based on the examination today, the patient's problem(s) is/are adequately but not ideally controlled.  New problems have not been presented today.   Co-morbidities are not complicating management of the primary condition.  There are no active rule-out diagnoses for this patient at this time.     General Impression:   Major depressive disorder, recurrent, mild  Generalized anxiety disorder. "       Intervention/Counseling/Treatment Plan   Medication Management: Continue current medications. The risks and benefits of medication were discussed with the patient.    Continue Lexapro 20 mg every eveing  -Increase Buspar 10 mg twice a day   Start Trazadon 50 mg at night for sleep and anxiety   Xanax 0.5 mg daily as needed for anxiety. Patient is tapering off Xanax. 15 tablets given instructed patient to take sparingly.     The risks and benefits of medication were discussed with the patient.  Discussed diagnosis, risks and benefits of proposed treatment above vs alternative treatments vs no treatment, and potential side effects of these treatments. The patient expresses understanding of the above and displays the capacity to agree with this treatment given said understanding. Patient also agrees that, currently, the benefits outweigh the risks and would like to pursue treatment at this time.  Discussed inherent unpredictability of medications in each individual.   Encouraged Patient to keep future appointments.   Take medications as prescribed and abstain from substance abuse.   In the event of an emergency patient was advised to go to the emergency room      Return to Clinic: 2 months    PATY Heredia       Total face to face time: 39 min        *This note has been prepared using a combination of a dictation device and typing.  It has been checked for errors but some errors may still exist within the note as a result of speech recognition errors and/or typographical errors.

## 2025-03-20 ENCOUNTER — TELEPHONE (OUTPATIENT)
Dept: PSYCHIATRY | Facility: CLINIC | Age: 75
End: 2025-03-20
Payer: MEDICARE

## 2025-03-20 ENCOUNTER — PATIENT MESSAGE (OUTPATIENT)
Dept: PSYCHIATRY | Facility: CLINIC | Age: 75
End: 2025-03-20
Payer: MEDICARE

## 2025-03-20 NOTE — TELEPHONE ENCOUNTER
----- Message from Rosalia sent at 3/20/2025  2:11 PM CDT -----  Regarding: Medical Assistance  Contact: Patient  Type:  Needs Medical AdviceWho Called: Patient Symptoms (please be specific): n/a  How long has patient had these symptoms:  n/a Pharmacy name and phone #:  n/a Would the patient rather a call back or a response via [facility]? Call back Best Call Back Number:  [phone] Information: Patient stated she experienced a panic attack this afternoon and would like a call back for further assistance on how to proceed Please Assist

## 2025-03-21 ENCOUNTER — TELEPHONE (OUTPATIENT)
Dept: PSYCHIATRY | Facility: HOSPITAL | Age: 75
End: 2025-03-21
Payer: MEDICARE

## 2025-03-21 RX ORDER — ALPRAZOLAM 0.5 MG/1
0.5 TABLET ORAL DAILY PRN
Qty: 15 TABLET | Refills: 0 | Status: SHIPPED | OUTPATIENT
Start: 2025-03-21 | End: 2025-04-05

## 2025-03-21 NOTE — TELEPHONE ENCOUNTER
Called patient back to discuss anxiety from message in basket stating she was having a panic attack. Left voice message to call clinic or send message in portal.

## 2025-03-21 NOTE — TELEPHONE ENCOUNTER
Called patient back states she was concerned because she had a panic attack on yesterday. Patient has been weaning off Xanax which she has used daily. Last know dose was March 8. Provider explained to patient Xanax 0.5 mg as needed use sparingly new prescription will be sent. Please use only if needed for panic attacks until Lexapro and Buspar effects can be shown. Educated patient that clinic will be closed on the weekend and if she may have a panic attack to please go to the nearest emergency room if panic attack is not controled. Patient states she understands.    No

## 2025-03-24 DIAGNOSIS — Z00.00 ENCOUNTER FOR MEDICARE ANNUAL WELLNESS EXAM: ICD-10-CM

## 2025-04-03 ENCOUNTER — TELEPHONE (OUTPATIENT)
Dept: PRIMARY CARE CLINIC | Facility: CLINIC | Age: 75
End: 2025-04-03
Payer: MEDICARE

## 2025-04-11 ENCOUNTER — PATIENT OUTREACH (OUTPATIENT)
Dept: ADMINISTRATIVE | Facility: HOSPITAL | Age: 75
End: 2025-04-11
Payer: MEDICARE

## 2025-04-11 NOTE — PROGRESS NOTES
Non-diabetic eye exam uploaded to [facility]    , care everywhere, immunizations updated  Chart review complete

## 2025-04-15 ENCOUNTER — PATIENT MESSAGE (OUTPATIENT)
Dept: PSYCHIATRY | Facility: CLINIC | Age: 75
End: 2025-04-15
Payer: MEDICARE

## 2025-04-15 NOTE — PROGRESS NOTES
Urogyn follow up  04/16/2025  .  [facility]  [address]  [facility] [id number]    Nicolekieran Jacques  [id number]  1950      Nicolekieran Jacques is a 75 y.o. here for a urogyn follow up for mixed urinary incontinence    Last HPI from 08/09/2023  1)  UI:  (--) ROCHELLE < (+) UUI  X 5years.  (+) pads:3/day, usually moderate wetness  Daytime frequency: Q 3-4 hours.  Nocturia: Yes: 3-4 night.  Does not limit fluids prior to bedtime. (--) dysuria,  (--) hematuria,  (--) frequent UTIs.  (+) complete bladder emptying. Has tried oxybutynin in the past-- did not tolerate due to drying effects (did help with urgency/ frequency)     2)  POP:  Absent. Symptoms:(--)    (--) vaginal bleeding. (--) vaginal discharge. (+) sexually active.  (--) dyspareunia.   (+)  Vaginal dryness.  (+) vaginal estrogen use.   POP-Q:  Aa -1; Ba -1; C -7; Ap -3; Bp -3; D n/a.  Genital hiatus 3, perineal body 2 total vaginal length 9   Pvr 5 mL     3)  BM:  (--) constipation/straining.  (--) chronic diarrhea. (--) hematochezia.  (--) fecal incontinence.  (--) fecal smearing/urgency.  (+) complete evacuation.  Takes magnesium supplement    02/19/2025  1)  Mixed urinary incontinence, urge > stress:    --voiding every 2 hours during the day and 2/night. Does not always limit fluids prior to bedtime.   --using 2-3 pads/ day with severe wetness.    2)vaginal atrophy  --using vaginal estrogen cream      3)midline cystocele stage 2  --asymtpomatic  --pvr 5 mL    Changes since last visit:  1)  Mixed urinary incontinence, urge > stress:    --took myrbetriq x 2 weeks-- felt like she was having dry mouth, eyes  --stopped oxybutynin in the past due to side effects  --gemtesa was not affordable   - gets up 1x/night for bathroom, moderate wetness  - tries to limit fluids after 7pm  --voiding every 3 hours during the day  --using 2 pads/ day with moderate wetness    2)vaginal atrophy  --using vaginal estrogen cream 3 times weekly     3)midline  cystocele stage 2  --asymtpomatic      Past Medical History:   Diagnosis Date    Anxiety     Cataract     Depression     Family history of colon cancer     GERD (gastroesophageal reflux disease)     Hypertension     Lumbar spondylosis     Lumbar spondylosis     Overactive bladder     Urge urinary incontinence        Past Surgical History:   Procedure Laterality Date    APPENDECTOMY      CATARACT EXTRACTION W/  INTRAOCULAR LENS IMPLANT Left 06/03/2014    Dr Drake    CATARACT EXTRACTION W/  INTRAOCULAR LENS IMPLANT Right 07/07/2014    COLONOSCOPY N/A 11/02/2015    Procedure: COLONOSCOPY;  Surgeon: Desmond Casillas MD;  Location: [facility] ENDO ([facility] FLR);  Service: Endoscopy;  Laterality: N/A;    COLONOSCOPY N/A 03/16/2021    Procedure: COLONOSCOPY;  Surgeon: Desmond Casillas MD;  Location: [facility] ENDO ([facility] FLR);  Service: Endoscopy;  Laterality: N/A;  covid test 3/13 primary care, instructions emailed-KPvt    EYE SURGERY      HYSTERECTOMY      partial    KNEE SURGERY Left     TUBAL LIGATION         Family History   Problem Relation Name Age of Onset    Diabetes Paternal Grandmother      Diabetes Maternal Grandmother      Dementia Father      Cancer Father          bladder    Basal cell carcinoma Father      Hypertension Mother      Kidney disease Mother      Heart disease Mother      Diabetes Brother      Hypertension Brother      Hypertension Sister      Diabetes Sister      Hypertension Sister      Breast cancer Sister  54        recurrence at age 64    Diabetes Sister      No Known Problems Daughter Sarai     No Known Problems Son Andrews     Cancer Maternal Uncle          colon cancer    Amblyopia Neg Hx      Blindness Neg Hx      Cataracts Neg Hx      Glaucoma Neg Hx      Macular degeneration Neg Hx      Retinal detachment Neg Hx      Strabismus Neg Hx      Melanoma Neg Hx         Social History     Socioeconomic History    Marital status:    Tobacco Use    Smoking status: Never     Passive exposure: Yes     Smokeless tobacco: Never   Substance and Sexual Activity    Alcohol use: Yes     Comment: Social.    Drug use: No    Sexual activity: Yes     Partners: Male     Comment: , together x 54 years   Other Topics Concern    Are you pregnant or think you may be? No    Breast-feeding No     Social Drivers of Health     Financial Resource Strain: Low Risk  (4/29/2024)    Overall Financial Resource Strain (CARDIA)     Difficulty of Paying Living Expenses: Not hard at all   Food Insecurity: No Food Insecurity (4/29/2024)    Hunger Vital Sign     Worried About Running Out of Food in the Last Year: Never true     Ran Out of Food in the Last Year: Never true   Transportation Needs: No Transportation Needs (4/29/2024)    PRAPARE - Transportation     Lack of Transportation (Medical): No     Lack of Transportation (Non-Medical): No   Physical Activity: Sufficiently Active (4/29/2024)    Exercise Vital Sign     Days of Exercise per Week: 3 days     Minutes of Exercise per Session: 60 min   Stress: Stress Concern Present (4/29/2024)    [employer] - Occupational Stress Questionnaire     Feeling of Stress : To some extent   Housing Stability: Unknown (4/29/2024)    Housing Stability Vital Sign     Unable to Pay for Housing in the Last Year: No       Current Medications[1]    Review of patient's allergies indicates:   Allergen Reactions    Sulfamethoxazole-trimethoprim Nausea And Vomiting and Other (See Comments)     Other reaction(s): Migraine       Well woman:  Pap test: 06/2018 normal no ecc.  History of abnormal paps: No.  History of STIs:  No  Mammogram: Date of last: 12/2022.  Result: Normal  Colonoscopy: Date of last: 03/2021.  Result:  diverticula.  Repeat due:  10 years.    DEXA:  Date of last: 12/2015.  Result:   Elevated BMD of the lumbar spine.       Recommendations:  1) Adequate calcium and Vitamin D therapy  2) Appropriate exercise  3) Consider repeat BMD in 4 years.    ROS:  As per HPI.  "     Exam  /60 (BP Location: Left arm, Patient Position: Sitting)   Pulse 66   Ht 5' 5" (1.651 m)   BMI 24.49 kg/m²   General: alert and oriented, no acute distress  Respiratory: normal respiratory effort  Abd: soft, non-tender, non-distended    Pelvic--deferred    Impression  1. Urge incontinence        2. Midline cystocele        3. Vaginal dryness  estradioL (ESTRACE) 0.01 % (0.1 mg/gram) vaginal cream          We reviewed the above issues and discussed options for short-term versus long-term management of her problems.   Plan:   1)  Mixed urinary incontinence, urge > stress:    --Empty bladder every 3 hours.  Empty well: wait a minute, lean forward on toilet.    --Avoid dietary irritants (see sheet).  Keep diary x 3-5 days to determine your irritants.  --KEGELS: do 10 in AM and 10 in PM, holding each x 10 seconds.  When you feel urge to go, STOP, KEGEL, and when urge has passed, then go to bathroom.  Consider PT in future.    --URGE: --failed myrbetriq and oxybutynin     2)vaginal atrophy  --continue vaginal estrogen cream every other day     3)midline cystocele stage 2  --asymtpomatic  --pvr 5 mL     4)RTC for pessary fitting        I spent a total of 20 minutes on the day of the visit.  This includes face to face time and non-face to face time preparing to see the patient (eg, review of tests), obtaining and/or reviewing separately obtained history, documenting clinical information in the electronic or other health record, independently interpreting results and communicating results to the patient/family/caregiver, or care coordinator.     Dinorah Szymanski, [facility]  Division of Female Pelvic Medicine and Reconstructive Surgery  Department of Obstetrics & Gynecology         [1]   Current Outpatient Medications   Medication Sig Dispense Refill    amLODIPine (NORVASC) 10 MG tablet TAKE 1 TABLET BY MOUTH ONCE  DAILY 90 tablet 1    ascorbic acid, vitamin C, (VITAMIN C) 1000 MG " tablet Take 2 tablets by mouth every morning. Takes two 1000 mg tablets every morning      aspirin (ECOTRIN) 81 MG EC tablet Take 81 mg by mouth every morning.      busPIRone (BUSPAR) 10 MG tablet Take 1 tablet (10 mg total) by mouth 2 (two) times daily. 60 tablet 11    co-enzyme Q-10 30 mg capsule Take 30 mg by mouth every morning.       EScitalopram oxalate (LEXAPRO) 20 MG tablet TAKE 1 TABLET BY MOUTH ONCE  DAILY 90 tablet 2    estradioL (ESTRACE) 0.01 % (0.1 mg/gram) vaginal cream Place 1 g vaginally 3 (three) times a week. 42 g 3    estradioL (ESTRACE) 0.5 MG tablet TAKE 1 TABLET BY MOUTH ONCE  DAILY 90 tablet 0    losartan (COZAAR) 100 MG tablet Take 1 tablet (100 mg total) by mouth once daily. 90 tablet 2    MULTIVITAMIN (MULTIPLE VITAMIN ESSENTIAL ORAL) Take 1 tablet by mouth every morning.       omeprazole (PRILOSEC) 20 MG capsule TAKE 1 CAPSULE BY MOUTH ONCE  DAILY 90 capsule 1    pravastatin (PRAVACHOL) 10 MG tablet TAKE 1 TABLET BY MOUTH ONCE  DAILY FOR VASCULAR HEALTH 90 tablet 1    primidone (MYSOLINE) 50 MG Tab Take 1 tablet (50 mg total) by mouth once daily. 30 tablet 11    traZODone (DESYREL) 50 MG tablet Take 1 tablet (50 mg total) by mouth every evening. 30 tablet 3    VITAMIN D3 5,000 unit Tab Take 5,000 Units by mouth every morning.       ALPRAZolam (XANAX) 0.5 MG tablet Take 1 tablet (0.5 mg total) by mouth daily as needed for Anxiety. Use sparingly for anxiety 15 tablet 0    estradioL (ESTRACE) 0.01 % (0.1 mg/gram) vaginal cream Place 1 g vaginally once daily. 42.5 g 3    mirabegron (MYRBETRIQ) 50 mg Tb24 Take 1 tablet (50 mg total) by mouth once daily. (Patient not taking: Reported on 4/16/2025) 30 tablet 11     No current facility-administered medications for this visit.

## 2025-04-16 ENCOUNTER — OFFICE VISIT (OUTPATIENT)
Dept: UROGYNECOLOGY | Facility: CLINIC | Age: 75
End: 2025-04-16
Payer: MEDICARE

## 2025-04-16 VITALS
DIASTOLIC BLOOD PRESSURE: 60 MMHG | HEIGHT: 65 IN | SYSTOLIC BLOOD PRESSURE: 102 MMHG | BODY MASS INDEX: 24.49 KG/M2 | HEART RATE: 66 BPM

## 2025-04-16 DIAGNOSIS — N81.11 MIDLINE CYSTOCELE: ICD-10-CM

## 2025-04-16 DIAGNOSIS — N89.8 VAGINAL DRYNESS: ICD-10-CM

## 2025-04-16 DIAGNOSIS — N39.41 URGE INCONTINENCE: Primary | ICD-10-CM

## 2025-04-16 PROCEDURE — 1159F MED LIST DOCD IN RCRD: CPT | Mod: CPTII,S$GLB,, | Performed by: NURSE PRACTITIONER

## 2025-04-16 PROCEDURE — 3078F DIAST BP <80 MM HG: CPT | Mod: CPTII,S$GLB,, | Performed by: NURSE PRACTITIONER

## 2025-04-16 PROCEDURE — 99213 OFFICE O/P EST LOW 20 MIN: CPT | Mod: S$GLB,,, | Performed by: NURSE PRACTITIONER

## 2025-04-16 PROCEDURE — 1101F PT FALLS ASSESS-DOCD LE1/YR: CPT | Mod: CPTII,S$GLB,, | Performed by: NURSE PRACTITIONER

## 2025-04-16 PROCEDURE — 1126F AMNT PAIN NOTED NONE PRSNT: CPT | Mod: CPTII,S$GLB,, | Performed by: NURSE PRACTITIONER

## 2025-04-16 PROCEDURE — 3074F SYST BP LT 130 MM HG: CPT | Mod: CPTII,S$GLB,, | Performed by: NURSE PRACTITIONER

## 2025-04-16 PROCEDURE — 3288F FALL RISK ASSESSMENT DOCD: CPT | Mod: CPTII,S$GLB,, | Performed by: NURSE PRACTITIONER

## 2025-04-16 PROCEDURE — 1160F RVW MEDS BY RX/DR IN RCRD: CPT | Mod: CPTII,S$GLB,, | Performed by: NURSE PRACTITIONER

## 2025-04-16 PROCEDURE — 99999 PR PBB SHADOW E&M-EST. PATIENT-LVL IV: CPT | Mod: PBBFAC,,, | Performed by: NURSE PRACTITIONER

## 2025-04-16 RX ORDER — ESTRADIOL 0.1 MG/G
1 CREAM VAGINAL DAILY
Qty: 42.5 G | Refills: 3 | Status: SHIPPED | OUTPATIENT
Start: 2025-04-16

## 2025-04-17 NOTE — PATIENT INSTRUCTIONS
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  Empty well: wait a minute, lean forward on toilet.    --Avoid dietary irritants (see sheet).  Keep diary x 3-5 days to determine your irritants.  --KEGELS: do 10 in AM and 10 in PM, holding each x 10 seconds.  When you feel urge to go, STOP, KEGEL, and when urge has passed, then go to bathroom.  Consider PT in future.    --URGE: --failed myrbetriq and oxybutynin     2)vaginal atrophy  --continue vaginal estrogen cream every other day     3)midline cystocele stage 2  --asymtpomatic  --pvr 5 mL     4)RTC for pessary fitting

## 2025-04-21 ENCOUNTER — TELEPHONE (OUTPATIENT)
Dept: PSYCHIATRY | Facility: HOSPITAL | Age: 75
End: 2025-04-21
Payer: MEDICARE

## 2025-04-21 DIAGNOSIS — F41.1 GAD (GENERALIZED ANXIETY DISORDER): Primary | ICD-10-CM

## 2025-04-21 RX ORDER — ALPRAZOLAM 1 MG/1
1 TABLET ORAL DAILY PRN
Qty: 20 TABLET | Refills: 0 | Status: SHIPPED | OUTPATIENT
Start: 2025-04-21 | End: 2025-04-21 | Stop reason: CLARIF

## 2025-04-21 RX ORDER — ALPRAZOLAM 0.5 MG/1
0.5 TABLET ORAL DAILY PRN
Qty: 20 TABLET | Refills: 0 | Status: SHIPPED | OUTPATIENT
Start: 2025-04-21 | End: 2025-05-21

## 2025-04-21 NOTE — TELEPHONE ENCOUNTER
Called patient to discuss medication due to anxiety. No answer Voice mail was left. Also tried to call [facility] due to Xanax prescription. Pharmacy does not open until 9 am will call back.

## 2025-04-21 NOTE — TELEPHONE ENCOUNTER
Patient states she still feels anxious and is feeling down and depressed. States she ran out of Xanax and would like to see other options for depression and anxiety. Refilled Xanax 0.5 mg. Patient states she would like to continue with tapering off. Will discuss other options for depression and anxiety for next visit.

## 2025-04-22 ENCOUNTER — TELEPHONE (OUTPATIENT)
Dept: FAMILY MEDICINE | Facility: CLINIC | Age: 75
End: 2025-04-22
Payer: MEDICARE

## 2025-04-22 ENCOUNTER — TELEPHONE (OUTPATIENT)
Dept: PRIMARY CARE CLINIC | Facility: CLINIC | Age: 75
End: 2025-04-22
Payer: MEDICARE

## 2025-04-22 NOTE — TELEPHONE ENCOUNTER
Returned call to patient regards to scheduling NP appt  No answer LVM to return call to scheduling advising availability of NP appt    ----- Message from Sundar sent at 4/22/2025  3:27 PM CDT -----  Contact: pt  Type:  Sooner Apoointment RequestCaller is requesting a sooner appointment.  Caller declined first available appointment listed below.  Caller will not accept being placed on the waitlist and is requesting a message be sent to doctor.Name of Caller:pt When is the first available appointment?n/aSymptoms:annual wellness exam Would the patient rather a call back or a response via [facility]? Call Best Call Back Number:[phone] Additional Information:

## 2025-04-22 NOTE — TELEPHONE ENCOUNTER
----- Message from Rocío sent at 4/22/2025  3:14 PM CDT -----  Type:  Sooner Appointment RequestCaller is requesting a sooner appointment.  Caller declined first available appointment listed below.  Caller will not accept being placed on the waitlist and is requesting a message be sent to doctor.Name of Caller: Pt When is the first available appointment? eAWV Symptoms: Encounter for Medicare annual wellness exam [Z00.00]Would the patient rather a call back or a response via [facility]? Call back Best Call Back Number: [phone]

## 2025-04-23 DIAGNOSIS — I10 ESSENTIAL HYPERTENSION: ICD-10-CM

## 2025-04-23 RX ORDER — LOSARTAN POTASSIUM 100 MG/1
100 TABLET ORAL DAILY
Qty: 90 TABLET | Refills: 1 | Status: SHIPPED | OUTPATIENT
Start: 2025-04-23

## 2025-04-23 NOTE — TELEPHONE ENCOUNTER
Care Due:                  Date            Visit Type   Department     Provider  --------------------------------------------------------------------------------                                EP -                              PRIMARY      [facility] PRIMARY   Zara Padilla  Last Visit: 12-      CARE ([facility])   CARE           [place]                              EP -                              PRIMARY      [facility] PRIMARY   Zara Padilla  Next Visit: 06-      CARE ([facility])   CARE           [place]                                                            Last  Test          Frequency    Reason                     Performed    Due Date  --------------------------------------------------------------------------------    CMP.........  12 months..  pravastatin..............  05- 05-    [facility] Embedded Care Due Messages. Reference number: [id number].   4/23/2025 9:17:45 AM CDT

## 2025-04-29 DIAGNOSIS — K21.9 GASTROESOPHAGEAL REFLUX DISEASE: ICD-10-CM

## 2025-04-29 RX ORDER — OMEPRAZOLE 20 MG/1
20 CAPSULE, DELAYED RELEASE ORAL
Qty: 90 CAPSULE | Refills: 2 | Status: SHIPPED | OUTPATIENT
Start: 2025-04-29

## 2025-04-30 ENCOUNTER — PATIENT MESSAGE (OUTPATIENT)
Dept: PSYCHIATRY | Facility: CLINIC | Age: 75
End: 2025-04-30
Payer: MEDICARE

## 2025-04-30 ENCOUNTER — TELEPHONE (OUTPATIENT)
Dept: PSYCHIATRY | Facility: CLINIC | Age: 75
End: 2025-04-30
Payer: MEDICARE

## 2025-04-30 ENCOUNTER — OFFICE VISIT (OUTPATIENT)
Dept: PSYCHIATRY | Facility: CLINIC | Age: 75
End: 2025-04-30
Payer: MEDICARE

## 2025-04-30 DIAGNOSIS — F43.21 ADJUSTMENT DISORDER WITH DEPRESSED MOOD: ICD-10-CM

## 2025-04-30 DIAGNOSIS — F41.1 GAD (GENERALIZED ANXIETY DISORDER): ICD-10-CM

## 2025-04-30 RX ORDER — ALPRAZOLAM 1 MG/1
1 TABLET ORAL DAILY PRN
Qty: 30 TABLET | Refills: 0 | Status: CANCELLED | OUTPATIENT
Start: 2025-05-08 | End: 2025-06-07

## 2025-04-30 RX ORDER — BUSPIRONE HYDROCHLORIDE 15 MG/1
15 TABLET ORAL 2 TIMES DAILY
Qty: 60 TABLET | Refills: 11 | Status: SHIPPED | OUTPATIENT
Start: 2025-04-30 | End: 2026-04-30

## 2025-04-30 NOTE — TELEPHONE ENCOUNTER
Refill Decision Note   Nicolekieran Websterain  is requesting a refill authorization.  Brief Assessment and Rationale for Refill:  Approve     Medication Therapy Plan:         Comments:     Note composed:10:06 PM 04/29/2025

## 2025-04-30 NOTE — TELEPHONE ENCOUNTER
No care due was identified.  [facility] Embedded Care Due Messages. Reference number: [id number].   4/29/2025 9:21:54 PM CDT

## 2025-04-30 NOTE — PROGRESS NOTES
"Outpatient Psychiatry Follow-Up Visit (MD/WAI)    4/30/2025    Clinical Status of Patient:  Outpatient (Ambulatory)    Chief Complaint:  Nicole Jacques is a 75 y.o. female who presents today for follow-up of depression and anxiety.  Met with patient.      Interval History and Content of Current Session 04/30/2025:    Pt reports today: "Patient states she feels depressed."    Mood overall is "I'm anxious and depressed"    Rates depression as 8/10 and anxiety as 5/10 over the past 2 weeks.    Patients mood is anxious, affect appears anxious. Linear and logical, friendly and cooperative, good eye contact.    Denies SI/HI/AVH. Pt reports sleeping well and normal appetite. Denies side effects of medications.    Pt reports taking medications as prescribed and behaving appropriately during interview today. Patent states she is feeling depressed and can't get out of the feeling. States she goes to the health club 3 times a week to work out and states it helps with her sleep and makes her feel better. States she feels that she is not getting any effects from the Buspar.   Patient states she is getting between 6 and 8 hours of sleep on the Trazodone and has no issues with sleep. Admits when she gets anxious she takes 1 or a half of 0.5 mg  Xanax and it allow her to feel that she can function better and it is hard to focus on things when she has not taken the Xanax.   States when she takes Xanax she has an appetite, eating, able to go to the gym and activities with no depression or anxiety.   Patient states her  has noticed a difference in her mood and her consetration. Paitent states she has not been wanting to go to [employer] stating she has a fear of being around people. Denies any thoughts of wanting to hurt herself. Patient states she lives for her  and family.   Psychotherapy:  Target symptoms: depression, anxiety   Why chosen therapy is appropriate versus another modality: relevant to diagnosis  Outcome " "monitoring methods: self-report  Therapeutic intervention type: supportive psychotherapy  Topics discussed/themes: building skills sets for symptom management, symptom recognition  The patient's response to the intervention is accepting. The patient's progress toward treatment goals is good.   Duration of intervention: 17 minutes.      Prior visit 03/19/2025:     Pt reports today: "I am ok just a little down"     Mood overall is "is down "     Rates depression as 8/10 and anxiety as 10/10 over the past 2 weeks.     Patients mood is anxious, affect appears anxious. Linear and logical, friendly and cooperative, good eye contact.     Denies SI/HI/AVH. Pt reports sleeping well and normal appetite. Denies side effects of medications.     Pt reports taking medications as prescribed and behaving appropriately during interview today. Patient states she has not been taking Xanax since March the 8th stating she thought her PCP prescribed it until May but it wasn't. Since she has been off Xanax complaints of increased tremors, stating she threw up once and has developed an increased sense of smell. Denies any current side effects but states she has noticed that her anxiety has increased at times.   Patient states she loves her life,  and family. States her and her  take walks in [place] some days to help exercise and get out of the house.    :            Review of Systems     ROS    Psychiatric Review Of Systems - Is patient experiencing or having changes in:  sleep: yes  appetite: yes  weight: no  energy/anergy: States when she takes Xanax she feel she has energy.   interest/pleasure/anhedonia: yes  somatic symptoms: no  libido: no  anxiety/panic: yes   guilty/hopelessness: no  concentration: yes  S.I.B.s/risky behavior: no  Irritability: yes  Racing thoughts: yes  Impulsive behaviors: no  Paranoia: yes states she gets paranoid with noises, not know what it is making her nervious.   AVH: no      Past " Medical, Family and Social History: The patient's past medical, family and social history have been reviewed and updated as appropriate within the electronic medical record - see encounter notes.      Current Medications:   Medication List with Changes/Refills   Current Medications    ALPRAZOLAM (XANAX) 0.5 MG TABLET    Take 1 tablet (0.5 mg total) by mouth daily as needed for Anxiety.    AMLODIPINE (NORVASC) 10 MG TABLET    TAKE 1 TABLET BY MOUTH ONCE  DAILY    ASCORBIC ACID, VITAMIN C, (VITAMIN C) 1000 MG TABLET    Take 2 tablets by mouth every morning. Takes two 1000 mg tablets every morning    ASPIRIN (ECOTRIN) 81 MG EC TABLET    Take 81 mg by mouth every morning.    BUSPIRONE (BUSPAR) 10 MG TABLET    Take 1 tablet (10 mg total) by mouth 2 (two) times daily.    CO-ENZYME Q-10 30 MG CAPSULE    Take 30 mg by mouth every morning.     ESCITALOPRAM OXALATE (LEXAPRO) 20 MG TABLET    TAKE 1 TABLET BY MOUTH ONCE  DAILY    ESTRADIOL (ESTRACE) 0.01 % (0.1 MG/GRAM) VAGINAL CREAM    Place 1 g vaginally 3 (three) times a week.    ESTRADIOL (ESTRACE) 0.01 % (0.1 MG/GRAM) VAGINAL CREAM    Place 1 g vaginally once daily.    ESTRADIOL (ESTRACE) 0.5 MG TABLET    TAKE 1 TABLET BY MOUTH ONCE  DAILY    LOSARTAN (COZAAR) 100 MG TABLET    Take 1 tablet (100 mg total) by mouth once daily.    MIRABEGRON (MYRBETRIQ) 50 MG TB24    Take 1 tablet (50 mg total) by mouth once daily.    MULTIVITAMIN (MULTIPLE VITAMIN ESSENTIAL ORAL)    Take 1 tablet by mouth every morning.     OMEPRAZOLE (PRILOSEC) 20 MG CAPSULE    TAKE 1 CAPSULE BY MOUTH ONCE  DAILY    PRAVASTATIN (PRAVACHOL) 10 MG TABLET    TAKE 1 TABLET BY MOUTH ONCE  DAILY FOR VASCULAR HEALTH    PRIMIDONE (MYSOLINE) 50 MG TAB    Take 1 tablet (50 mg total) by mouth once daily.    TRAZODONE (DESYREL) 50 MG TABLET    Take 1 tablet (50 mg total) by mouth every evening.    VITAMIN D3 5,000 UNIT TAB    Take 5,000 Units by mouth every morning.          Allergies:   Review of patient's allergies  "indicates:   Allergen Reactions    Sulfamethoxazole-trimethoprim Nausea And Vomiting and Other (See Comments)     Other reaction(s): Migraine         Vitals   There were no vitals filed for this visit.       Labs/Imaging/Studies:   No results found for this or any previous visit (from the past 48 hours).   No results found for: "PHENYTOIN", "PHENOBARB", "VALPROATE", "CBMZ"    Compliance: yes    Side effects: None    Risk Parameters:  Patient reports no suicidal ideation  Patient reports no homicidal ideation  Patient reports no self-injurious behavior  Patient reports no violent behavior    Exam (detailed: at least 9 elements; comprehensive: all 15 elements)   Constitutional  Vitals:  Most recent vital signs, dated less than 90 days prior to this appointment, were reviewed.   There were no vitals filed for this visit.     General:  unremarkable, age appropriate, casually dressed, well dressed     Musculoskeletal  Muscle Strength/Tone:  not examined   Gait & Station:  non-ataxic     Psychiatric  Speech:  no latency; no press   Mood & Affect:  anxious, depressed  congruent and appropriate   Thought Process:  normal and logical   Associations:  intact   Thought Content:  normal, no suicidality, no homicidality, delusions, or paranoia   Insight:  limited awareness of illness   Judgement: behavior is adequate to circumstances, age appropriate   Orientation:  grossly intact, person, place, situation, time/date, day of week, month of year, year   Memory: intact for content of interview   Language: grossly intact   Attention Span & Concentration:  able to focus   Fund of Knowledge:  intact and appropriate to age and level of education     Assessment and Diagnosis   Status/Progress: Based on the examination today, the patient's problem(s) is/are adequately but not ideally controlled.  New problems have not been presented today.   Co-morbidities are not complicating management of the primary condition.  There are no active " rule-out diagnoses for this patient at this time.     General Impression:    Major depressive disorder, recurrent, mild  Generalized anxiety disorder.     Intervention/Counseling/Treatment Plan   Medication Management: Continue current medications. The risks and benefits of medication were discussed with the patient.  Increase Buspar to 15 mg twice a day.   Continue Lexapro 20 mg daily  Xanax 1 mg patient will call for refills in May.         The risks and benefits of medication were discussed with the patient.  Discussed diagnosis, risks and benefits of proposed treatment above vs alternative treatments vs no treatment, and potential side effects of these treatments. The patient expresses understanding of the above and displays the capacity to agree with this treatment given said understanding. Patient also agrees that, currently, the benefits outweigh the risks and would like to pursue treatment at this time.  Discussed inherent unpredictability of medications in each individual.   Encouraged Patient to keep future appointments.   Take medications as prescribed and abstain from substance abuse.   In the event of an emergency patient was advised to go to the emergency room      Return to Clinic: 2 months    PATY Heredia      Total face to face time: 40 min        *This note has been prepared using a combination of a dictation device and typing.  It has been checked for errors but some errors may still exist within the note as a result of speech recognition errors and/or typographical errors.

## 2025-05-07 ENCOUNTER — PATIENT MESSAGE (OUTPATIENT)
Dept: UROGYNECOLOGY | Facility: CLINIC | Age: 75
End: 2025-05-07
Payer: MEDICARE

## 2025-05-09 ENCOUNTER — PATIENT MESSAGE (OUTPATIENT)
Dept: PSYCHIATRY | Facility: CLINIC | Age: 75
End: 2025-05-09
Payer: MEDICARE

## 2025-05-12 ENCOUNTER — TELEPHONE (OUTPATIENT)
Dept: PSYCHIATRY | Facility: HOSPITAL | Age: 75
End: 2025-05-12
Payer: MEDICARE

## 2025-05-12 DIAGNOSIS — F41.1 GENERALIZED ANXIETY DISORDER WITH PANIC ATTACKS: ICD-10-CM

## 2025-05-12 DIAGNOSIS — F41.1 GAD (GENERALIZED ANXIETY DISORDER): Primary | ICD-10-CM

## 2025-05-12 DIAGNOSIS — F41.0 GENERALIZED ANXIETY DISORDER WITH PANIC ATTACKS: ICD-10-CM

## 2025-05-12 RX ORDER — ALPRAZOLAM 1 MG/1
1 TABLET ORAL DAILY PRN
Qty: 30 TABLET | Refills: 0 | Status: SHIPPED | OUTPATIENT
Start: 2025-05-12 | End: 2025-06-11

## 2025-05-20 ENCOUNTER — OFFICE VISIT (OUTPATIENT)
Dept: PSYCHIATRY | Facility: CLINIC | Age: 75
End: 2025-05-20
Payer: MEDICARE

## 2025-05-20 VITALS
SYSTOLIC BLOOD PRESSURE: 120 MMHG | DIASTOLIC BLOOD PRESSURE: 75 MMHG | WEIGHT: 157.5 LBS | BODY MASS INDEX: 26.21 KG/M2 | HEART RATE: 83 BPM

## 2025-05-20 DIAGNOSIS — F33.0 MAJOR DEPRESSIVE DISORDER, RECURRENT, MILD: Primary | ICD-10-CM

## 2025-05-20 DIAGNOSIS — F41.1 GAD (GENERALIZED ANXIETY DISORDER): ICD-10-CM

## 2025-05-20 PROCEDURE — 3074F SYST BP LT 130 MM HG: CPT | Mod: CPTII,S$GLB,, | Performed by: FAMILY MEDICINE

## 2025-05-20 PROCEDURE — 3078F DIAST BP <80 MM HG: CPT | Mod: CPTII,S$GLB,, | Performed by: FAMILY MEDICINE

## 2025-05-20 PROCEDURE — 99213 OFFICE O/P EST LOW 20 MIN: CPT | Mod: S$GLB,,, | Performed by: FAMILY MEDICINE

## 2025-05-20 PROCEDURE — 99999 PR PBB SHADOW E&M-EST. PATIENT-LVL I: CPT | Mod: PBBFAC,,, | Performed by: FAMILY MEDICINE

## 2025-05-20 NOTE — PROGRESS NOTES
"Outpatient Psychiatry Follow-Up Visit (MD/WAI)    5/20/2025    Clinical Status of Patient:  Outpatient (Ambulatory)    Chief Complaint:  Nicole Jacques is a 75 y.o. female who presents today for follow-up of anxiety.  Met with patient.      Interval History and Content of Current Session 05/20/2025:    Pt reports today: "I am doing well just tired "    Mood overall is "ok"    Rates depression as 8/10 and anxiety as 8/10 over the past 2 weeks.    Patients mood is calm, affect appears relaxed. Linear and logical, friendly and cooperative, good eye contact.    Denies SI/HI/AVH. Pt reports sleeping well and normal appetite. Denies side effects of medications.    Pt reports taking medications as prescribed and behaving appropriately during interview today. My mood is better feeling ok today, my granddaughter graduated from high school yesterday. Patient states she is sleeping better and can't get up early enough or go to sleep early. Patients states the Trazodone helps her sleep. Patient states she still goes to the gym 3 times a week and also walks in the park. Patient states she use to do things around the house but feels depressed at times and doesn't. Patient states she has a fear of doing things now and doesn't know why. Patient states when she lays in bed her mind may go from one thing to another. Patient states she is not taking Xanax everyday only every other day. States on the days she doesn't take it she feels nervious, anxious and does not have a appetite. Patient states at times she feels overwhelmed especially when she see someone she may know in the store. Denies any thoughts of wanting to hurt herself. Patient states she just worries a lot especially with her health and her husbands health so she has started cooking health with fruits and vegetables.     Psychotherapy:  Target symptoms: depression, anxiety   Why chosen therapy is appropriate versus another modality: relevant to diagnosis  Outcome " "monitoring methods: self-report, observation  Therapeutic intervention type: supportive psychotherapy  Topics discussed/themes: building skills sets for symptom management, symptom recognition  The patient's response to the intervention is accepting. The patient's progress toward treatment goals is good.   Duration of intervention: 10 minutes.      Prior visit 04/30/2025:     Pt reports today: "Patient states she feels depressed."     Mood overall is "I'm anxious and depressed"     Rates depression as 8/10 and anxiety as 5/10 over the past 2 weeks.     Patients mood is anxious, affect appears anxious. Linear and logical, friendly and cooperative, good eye contact.     Denies SI/HI/AVH. Pt reports sleeping well and normal appetite. Denies side effects of medications.     Pt reports taking medications as prescribed and behaving appropriately during interview today. Patent states she is feeling depressed and can't get out of the feeling. States she goes to the health club 3 times a week to work out and states it helps with her sleep and makes her feel better. States she feels that she is not getting any effects from the Buspar.   Patient states she is getting between 6 and 8 hours of sleep on the Trazodone and has no issues with sleep. Admits when she gets anxious she takes 1 or a half of 0.5 mg  Xanax and it allow her to feel that she can function better and it is hard to focus on things when she has not taken the Xanax.   States when she takes Xanax she has an appetite, eating, able to go to the gym and activities with no depression or anxiety.   Patient states her  has noticed a difference in her mood and her concentration. Patient states she has not been wanting to go to [employer] stating she has a fear of being around people. Denies any thoughts of wanting to hurt herself. Patient states she lives for her  and family. :            Review of Systems     ROS    Psychiatric Review Of Systems - Is patient " experiencing or having changes in:  sleep: yes  appetite: yes  weight: no  energy/anergy: no  interest/pleasure/anhedonia: yes  somatic symptoms: no  libido: no  anxiety/panic: yes  guilty/hopelessness: yes feels hopeless that she can't do things around her home.   concentration: yes  S.I.B.s/risky behavior: no  Irritability: yes  Racing thoughts: yes  Impulsive behaviors: no  Paranoia: yes when people ask too many questions   AVH: no      Past Medical, Family and Social History: The patient's past medical, family and social history have been reviewed and updated as appropriate within the electronic medical record - see encounter notes.      Current Medications:   Medication List with Changes/Refills   Current Medications    ALPRAZOLAM (XANAX) 1 MG TABLET    Take 1 tablet (1 mg total) by mouth daily as needed for Anxiety.    AMLODIPINE (NORVASC) 10 MG TABLET    TAKE 1 TABLET BY MOUTH ONCE  DAILY    ASCORBIC ACID, VITAMIN C, (VITAMIN C) 1000 MG TABLET    Take 2 tablets by mouth every morning. Takes two 1000 mg tablets every morning    ASPIRIN (ECOTRIN) 81 MG EC TABLET    Take 81 mg by mouth every morning.    BUSPIRONE (BUSPAR) 15 MG TABLET    Take 1 tablet (15 mg total) by mouth 2 (two) times daily.    CO-ENZYME Q-10 30 MG CAPSULE    Take 30 mg by mouth every morning.     ESCITALOPRAM OXALATE (LEXAPRO) 20 MG TABLET    TAKE 1 TABLET BY MOUTH ONCE  DAILY    ESTRADIOL (ESTRACE) 0.01 % (0.1 MG/GRAM) VAGINAL CREAM    Place 1 g vaginally 3 (three) times a week.    ESTRADIOL (ESTRACE) 0.01 % (0.1 MG/GRAM) VAGINAL CREAM    Place 1 g vaginally once daily.    ESTRADIOL (ESTRACE) 0.5 MG TABLET    TAKE 1 TABLET BY MOUTH ONCE  DAILY    LOSARTAN (COZAAR) 100 MG TABLET    Take 1 tablet (100 mg total) by mouth once daily.    MIRABEGRON (MYRBETRIQ) 50 MG TB24    Take 1 tablet (50 mg total) by mouth once daily.    MULTIVITAMIN (MULTIPLE VITAMIN ESSENTIAL ORAL)    Take 1 tablet by mouth every morning.     OMEPRAZOLE (PRILOSEC) 20 MG  "CAPSULE    TAKE 1 CAPSULE BY MOUTH ONCE  DAILY    PRAVASTATIN (PRAVACHOL) 10 MG TABLET    TAKE 1 TABLET BY MOUTH ONCE  DAILY FOR VASCULAR HEALTH    PRIMIDONE (MYSOLINE) 50 MG TAB    Take 1 tablet (50 mg total) by mouth once daily.    TRAZODONE (DESYREL) 50 MG TABLET    Take 1 tablet (50 mg total) by mouth every evening.    VITAMIN D3 5,000 UNIT TAB    Take 5,000 Units by mouth every morning.          Allergies:   Review of patient's allergies indicates:   Allergen Reactions    Sulfamethoxazole-trimethoprim Nausea And Vomiting and Other (See Comments)     Other reaction(s): Migraine         Vitals   Vitals:    05/20/25 1257   BP: 120/75   Pulse: 83          Labs/Imaging/Studies:   No results found for this or any previous visit (from the past 48 hours).   No results found for: "PHENYTOIN", "PHENOBARB", "VALPROATE", "CBMZ"    Compliance: yes    Side effects: None    Risk Parameters:  Patient reports no suicidal ideation  Patient reports no homicidal ideation  Patient reports no self-injurious behavior  Patient reports no violent behavior    Exam (detailed: at least 9 elements; comprehensive: all 15 elements)   Constitutional  Vitals:  Most recent vital signs, dated less than 90 days prior to this appointment, were reviewed.   Vitals:    05/20/25 1257   BP: 120/75   Pulse: 83   Weight: 71.5 kg (157 lb 8.3 oz)        General:  unremarkable, age appropriate, casually dressed, well dressed     Musculoskeletal  Muscle Strength/Tone:  not examined   Gait & Station:  non-ataxic     Psychiatric  Speech:  no latency; no press   Mood & Affect:  anxious  congruent and appropriate   Thought Process:  normal and logical   Associations:  intact   Thought Content:  normal, no suicidality, no homicidality, delusions, or paranoia   Insight:  limited awareness of illness   Judgement: behavior is adequate to circumstances, age appropriate   Orientation:  grossly intact, person, place, situation, time/date, day of week, month of year, " year   Memory: intact for content of interview   Language: grossly intact   Attention Span & Concentration:  able to focus   Fund of Knowledge:  intact and appropriate to age and level of education     Assessment and Diagnosis   Status/Progress: Based on the examination today, the patient's problem(s) is/are adequately but not ideally controlled.  New problems have not been presented today.   Co-morbidities are not complicating management of the primary condition.  There are no active rule-out diagnoses for this patient at this time.     General Impression:    Major depressive disorder, recurrent, mild  Generalized anxiety disorder.     Intervention/Counseling/Treatment Plan   Medication Management: Continue current medications. The risks and benefits of medication were discussed with the patient.    -Increase Buspar to 15 mg twice a day.   Continue Lexapro 20 mg daily  Xanax 1 mg every other day. Plan is to wean off medication.        The risks and benefits of medication were discussed with the patient.  Discussed diagnosis, risks and benefits of proposed treatment above vs alternative treatments vs no treatment, and potential side effects of these treatments. The patient expresses understanding of the above and displays the capacity to agree with this treatment given said understanding. Patient also agrees that, currently, the benefits outweigh the risks and would like to pursue treatment at this time.  Discussed inherent unpredictability of medications in each individual.   Encouraged Patient to keep future appointments.   Take medications as prescribed and abstain from substance abuse.   In the event of an emergency patient was advised to go to the emergency room      Return to Clinic: 3 months    PATY Heredia      Total face to face time: 25 min        *This note has been prepared using a combination of a dictation device and typing.  It has been checked for errors but some errors may still exist within  the note as a result of speech recognition errors and/or typographical errors.

## 2025-05-27 ENCOUNTER — OFFICE VISIT (OUTPATIENT)
Dept: UROGYNECOLOGY | Facility: CLINIC | Age: 75
End: 2025-05-27
Payer: MEDICARE

## 2025-05-27 VITALS
HEIGHT: 65 IN | HEART RATE: 73 BPM | SYSTOLIC BLOOD PRESSURE: 101 MMHG | DIASTOLIC BLOOD PRESSURE: 54 MMHG | BODY MASS INDEX: 26.23 KG/M2 | WEIGHT: 157.44 LBS

## 2025-05-27 DIAGNOSIS — Z46.89 ENCOUNTER FOR FITTING AND ADJUSTMENT OF PESSARY: Primary | ICD-10-CM

## 2025-05-27 DIAGNOSIS — N89.8 VAGINAL DRYNESS: ICD-10-CM

## 2025-05-27 DIAGNOSIS — N81.11 MIDLINE CYSTOCELE: ICD-10-CM

## 2025-05-27 DIAGNOSIS — N39.41 URGE INCONTINENCE: ICD-10-CM

## 2025-05-27 PROCEDURE — 99214 OFFICE O/P EST MOD 30 MIN: CPT | Mod: 25,S$GLB,,

## 2025-05-27 PROCEDURE — 1159F MED LIST DOCD IN RCRD: CPT | Mod: CPTII,S$GLB,,

## 2025-05-27 PROCEDURE — 1101F PT FALLS ASSESS-DOCD LE1/YR: CPT | Mod: CPTII,S$GLB,,

## 2025-05-27 PROCEDURE — 1126F AMNT PAIN NOTED NONE PRSNT: CPT | Mod: CPTII,S$GLB,,

## 2025-05-27 PROCEDURE — 3288F FALL RISK ASSESSMENT DOCD: CPT | Mod: CPTII,S$GLB,,

## 2025-05-27 PROCEDURE — 3074F SYST BP LT 130 MM HG: CPT | Mod: CPTII,S$GLB,,

## 2025-05-27 PROCEDURE — 3078F DIAST BP <80 MM HG: CPT | Mod: CPTII,S$GLB,,

## 2025-05-27 PROCEDURE — 99999 PR PBB SHADOW E&M-EST. PATIENT-LVL IV: CPT | Mod: PBBFAC,,,

## 2025-05-27 PROCEDURE — 57160 INSERT PESSARY/OTHER DEVICE: CPT | Mod: S$GLB,,,

## 2025-05-27 NOTE — PATIENT INSTRUCTIONS
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  Empty well: wait a minute, lean forward on toilet.    --Avoid dietary irritants (see sheet).  Keep diary x 3-5 days to determine your irritants.  --KEGELS: do 10 in AM and 10 in PM, holding each x 10 seconds.  When you feel urge to go, STOP, KEGEL, and when urge has passed, then go to bathroom.  --URGE: --failed gemtesa, myrbetriq, and oxybutynin. Completed 6 sessions of PT. Trial pessary. Patient fit with #4 ring with support pessary. Patient was able to insert and remove on her own - will order pessary and deliver to her house. Message us when you have inserted it so we can schedule you for a 1 month follow up.    -- PESSARY CARE: Remove pessary as frequently as nightly and as infrequently as every 2-3 weeks.  Remove before intercourse.  May need to remove before bowel movements if straining dislodges.   Wash with soap and water (no ).  Replace using small amount of water-based lubricant (like KY jelly) at end being introduced into vagina.     --to insert:  Fold in half, push down behind cervix and back to back of vagina. Lift up under pubic bone.  --to remove: grab rim with forefinger and thumb, pull down and twist out        2)vaginal atrophy  --continue vaginal estrogen twice a week     3)midline cystocele stage 2  -- asymtpomatic  -- pessary     4)RTC for pessary check

## 2025-05-27 NOTE — PROGRESS NOTES
Urogyn follow up  05/27/2025  .  [facility] - [facility]  [address] [address]    Nicole Jacques  [id number]  1950    Nicole Jacques is a 75 y.o. here for a urogyn follow up and pessary fitting.    Last HPI from 08/09/2023  1)  UI:  (--) ROCHELLE < (+) UUI  X 5years.  (+) pads:3/day, usually moderate wetness  Daytime frequency: Q 3-4 hours.  Nocturia: Yes: 3-4 night.  Does not limit fluids prior to bedtime. (--) dysuria,  (--) hematuria,  (--) frequent UTIs.  (+) complete bladder emptying. Has tried oxybutynin in the past-- did not tolerate due to drying effects (did help with urgency/ frequency)     2)  POP:  Absent. Symptoms:(--)    (--) vaginal bleeding. (--) vaginal discharge. (+) sexually active.  (--) dyspareunia.   (+)  Vaginal dryness.  (+) vaginal estrogen use.   POP-Q:  Aa -1; Ba -1; C -7; Ap -3; Bp -3; D n/a.  Genital hiatus 3, perineal body 2 total vaginal length 9   Pvr 5 mL     3)  BM:  (--) constipation/straining.  (--) chronic diarrhea. (--) hematochezia.  (--) fecal incontinence.  (--) fecal smearing/urgency.  (+) complete evacuation.  Takes magnesium supplement     02/19/2025  1)  Mixed urinary incontinence, urge > stress:    --voiding every 2 hours during the day and 2/night. Does not always limit fluids prior to bedtime.   --using 2-3 pads/ day with severe wetness.     2)vaginal atrophy  --using vaginal estrogen cream      3)midline cystocele stage 2  --asymtpomatic  --pvr 5 mL     4/16/2025:  1)  Mixed urinary incontinence, urge > stress:    --took myrbetriq x 2 weeks-- felt like she was having dry mouth, eyes  --stopped oxybutynin in the past due to side effects  --gemtesa was not affordable  -- gets up 1x/night for bathroom, moderate wetness  -- tries to limit fluids after 7pm  --voiding every 3 hours during the day  -- using 2 pads/ day with moderate wetness     2)vaginal atrophy  --using vaginal estrogen cream 3 times weekly     3)midline cystocele stage  2  --asymtpomatic    Changes from last visit (4/16/2025):  -- gemtesa not affordable, mybetriq did not work, failed oxybutynin due to side effects  -- completed pelvic floor PT, intermittently does exercises at home  -- using vaginal estrogen cream every day  -- here for pessary fitting    Past Medical History:   Diagnosis Date    Anxiety     Cataract     Depression     Family history of colon cancer     GERD (gastroesophageal reflux disease)     Hypertension     Lumbar spondylosis     Lumbar spondylosis     Overactive bladder     Urge urinary incontinence        Past Surgical History:   Procedure Laterality Date    APPENDECTOMY      CATARACT EXTRACTION W/  INTRAOCULAR LENS IMPLANT Left 06/03/2014    Dr Drake    CATARACT EXTRACTION W/  INTRAOCULAR LENS IMPLANT Right 07/07/2014    COLONOSCOPY N/A 11/02/2015    Procedure: COLONOSCOPY;  Surgeon: Desmond Casillas MD;  [facility]  Service: Endoscopy;  Laterality: N/A;    COLONOSCOPY N/A 03/16/2021    Procedure: COLONOSCOPY;  Surgeon: Desmond Casillas MD;  [facility]  Service: Endoscopy;  Laterality: N/A;  covid test 3/13 primary care, instructions emailed-KPvt    EYE SURGERY      HYSTERECTOMY      partial    KNEE SURGERY Left     TUBAL LIGATION         Family History   Problem Relation Name Age of Onset    Diabetes Paternal Grandmother      Diabetes Maternal Grandmother      Dementia Father      Cancer Father          bladder    Basal cell carcinoma Father      Hypertension Mother      Kidney disease Mother      Heart disease Mother      Diabetes Brother      Hypertension Brother      Hypertension Sister      Diabetes Sister      Hypertension Sister      Breast cancer Sister  54        recurrence at age 64    Diabetes Sister      No Known Problems Daughter Sarai     No Known Problems Son Andrews     Cancer Maternal Uncle          colon cancer    Amblyopia Neg Hx      Blindness Neg Hx      Cataracts Neg Hx      Glaucoma Neg Hx       Macular degeneration Neg Hx      Retinal detachment Neg Hx      Strabismus Neg Hx      Melanoma Neg Hx         Social History     Socioeconomic History    Marital status:    Tobacco Use    Smoking status: Never     Passive exposure: Yes    Smokeless tobacco: Never   Substance and Sexual Activity    Alcohol use: Yes     Comment: Social.    Drug use: No    Sexual activity: Yes     Partners: Male     Comment: , together x 54 years   Other Topics Concern    Are you pregnant or think you may be? No    Breast-feeding No     Social Drivers of Health     Financial Resource Strain: Low Risk  (4/30/2025)    Overall Financial Resource Strain (CARDIA)     Difficulty of Paying Living Expenses: Not very hard   Food Insecurity: No Food Insecurity (4/30/2025)    Hunger Vital Sign     Worried About Running Out of Food in the Last Year: Never true     Ran Out of Food in the Last Year: Never true   Transportation Needs: Unknown (4/30/2025)    PRAPARE - Transportation     Lack of Transportation (Medical): No   Physical Activity: Unknown (4/30/2025)    Exercise Vital Sign     Days of Exercise per Week: 3 days   Stress: Stress Concern Present (4/30/2025)    [employer] [place] [employer] - Occupational Stress Questionnaire     Feeling of Stress : Rather much   Housing Stability: Low Risk  (4/30/2025)    Housing Stability Vital Sign     Unable to Pay for Housing in the Last Year: No     Number of Times Moved in the Last Year: 0     Homeless in the Last Year: No       Current Medications[1]    Review of patient's allergies indicates:   Allergen Reactions    Sulfamethoxazole-trimethoprim Nausea And Vomiting and Other (See Comments)     Other reaction(s): Migraine     ROS:  As per HPI.      Exam  There were no vitals taken for this visit.  General: alert and oriented, no acute distress  Respiratory: normal respiratory effort  Abd: soft, non-tender, non-distended    Pelvic  Ext. Genitalia: normal external genitalia.  Normal bartholin's and skeens glands  Vagina: + atrophy. Normal vaginal mucosa without lesions. No discharge noted.   Non-tender bladder base without palpable mass.  Cervix: absent  Uterus:  surgically absent, vaginal cuff well healed   Urethra: no masses or tenderness  Urethral meatus: no lesions, caruncle or prolapse.    The patient was fit with #4 ring with support pessary.  She was able to tolerate the device comfortabley with bending, squatting, valsalva.  She was able to demonstrate independent removal and placement.  She tolerated the procedure well.       Impression  1. Encounter for fitting and adjustment of pessary        2. Urge incontinence        3. Midline cystocele        4. Vaginal dryness          We reviewed the above issues and discussed options for short-term versus long-term management of her problems.   Plan:     1)  Mixed urinary incontinence, urge > stress:    --Empty bladder every 3 hours.  Empty well: wait a minute, lean forward on toilet.    --Avoid dietary irritants (see sheet).  Keep diary x 3-5 days to determine your irritants.  --KEGELS: do 10 in AM and 10 in PM, holding each x 10 seconds.  When you feel urge to go, STOP, KEGEL, and when urge has passed, then go to bathroom.  --URGE: --failed gemtesa, myrbetriq, and oxybutynin. Completed 6 sessions of PT. Trial pessary. Patient fit with #4 ring with support pessary. Patient was able to insert and remove on her own - will order pessary and deliver to her house. Message us when you have inserted it so we can schedule you for a 1 month follow up.    -- PESSARY CARE: Remove pessary as frequently as nightly and as infrequently as every 2-3 weeks.  Remove before intercourse.  May need to remove before bowel movements if straining dislodges.   Wash with soap and water (no ).  Replace using small amount of water-based lubricant (like KY jelly) at end being introduced into vagina.     --to insert:  Fold in half, push down behind  cervix and back to back of vagina. Lift up under pubic bone.  --to remove: grab rim with forefinger and thumb, pull down and twist out        2)vaginal atrophy  --continue vaginal estrogen twice a week     3)midline cystocele stage 2  -- asymtpomatic  -- pessary     4)RTC for pessary check    I spent a total of 30 minutes on the day of the visit.  This includes face to face time and non-face to face time preparing to see the patient (eg, review of tests), obtaining and/or reviewing separately obtained history, documenting clinical information in the electronic or other health record, independently interpreting results and communicating results to the patient/family/caregiver, or care coordinator.     Nicole Rodriguez PA-C  [facility]  Division of Female Pelvic Medicine and Reconstructive Surgery  Department of Obstetrics & Gynecology       [1]   Current Outpatient Medications   Medication Sig Dispense Refill    ALPRAZolam (XANAX) 1 MG tablet Take 1 tablet (1 mg total) by mouth daily as needed for Anxiety. 30 tablet 0    amLODIPine (NORVASC) 10 MG tablet TAKE 1 TABLET BY MOUTH ONCE  DAILY 90 tablet 1    ascorbic acid, vitamin C, (VITAMIN C) 1000 MG tablet Take 2 tablets by mouth every morning. Takes two 1000 mg tablets every morning      aspirin (ECOTRIN) 81 MG EC tablet Take 81 mg by mouth every morning.      busPIRone (BUSPAR) 15 MG tablet Take 1 tablet (15 mg total) by mouth 2 (two) times daily. 60 tablet 11    co-enzyme Q-10 30 mg capsule Take 30 mg by mouth every morning.       EScitalopram oxalate (LEXAPRO) 20 MG tablet TAKE 1 TABLET BY MOUTH ONCE  DAILY 90 tablet 2    estradioL (ESTRACE) 0.01 % (0.1 mg/gram) vaginal cream Place 1 g vaginally 3 (three) times a week. 42 g 3    estradioL (ESTRACE) 0.01 % (0.1 mg/gram) vaginal cream Place 1 g vaginally once daily. 42.5 g 3    estradioL (ESTRACE) 0.5 MG tablet TAKE 1 TABLET BY MOUTH ONCE  DAILY 90 tablet 0    losartan (COZAAR) 100 MG tablet Take 1  tablet (100 mg total) by mouth once daily. 90 tablet 1    mirabegron (MYRBETRIQ) 50 mg Tb24 Take 1 tablet (50 mg total) by mouth once daily. (Patient not taking: Reported on 4/16/2025) 30 tablet 11    MULTIVITAMIN (MULTIPLE VITAMIN ESSENTIAL ORAL) Take 1 tablet by mouth every morning.       omeprazole (PRILOSEC) 20 MG capsule TAKE 1 CAPSULE BY MOUTH ONCE  DAILY 90 capsule 2    pravastatin (PRAVACHOL) 10 MG tablet TAKE 1 TABLET BY MOUTH ONCE  DAILY FOR VASCULAR HEALTH 90 tablet 1    primidone (MYSOLINE) 50 MG Tab Take 1 tablet (50 mg total) by mouth once daily. 30 tablet 11    traZODone (DESYREL) 50 MG tablet Take 1 tablet (50 mg total) by mouth every evening. 30 tablet 3    VITAMIN D3 5,000 unit Tab Take 5,000 Units by mouth every morning.        No current facility-administered medications for this visit.

## 2025-06-04 ENCOUNTER — PATIENT MESSAGE (OUTPATIENT)
Dept: UROGYNECOLOGY | Facility: CLINIC | Age: 75
End: 2025-06-04
Payer: MEDICARE

## 2025-06-04 ENCOUNTER — PATIENT MESSAGE (OUTPATIENT)
Dept: OBSTETRICS AND GYNECOLOGY | Facility: CLINIC | Age: 75
End: 2025-06-04
Payer: MEDICARE

## 2025-06-15 DIAGNOSIS — F43.21 ADJUSTMENT DISORDER WITH DEPRESSED MOOD: ICD-10-CM

## 2025-06-15 DIAGNOSIS — F41.1 GAD (GENERALIZED ANXIETY DISORDER): ICD-10-CM

## 2025-06-16 RX ORDER — ESCITALOPRAM OXALATE 20 MG/1
20 TABLET ORAL
Qty: 90 TABLET | Refills: 1 | Status: SHIPPED | OUTPATIENT
Start: 2025-06-16

## 2025-06-16 NOTE — TELEPHONE ENCOUNTER
Refill Decision Note   Nicole Websterain  is requesting a refill authorization.  Brief Assessment and Rationale for Refill:  Approve     Medication Therapy Plan:         Comments:     Note composed:5:43 AM 06/16/2025

## 2025-06-16 NOTE — TELEPHONE ENCOUNTER
No care due was identified.  [facility] Embedded Care Due Messages. Reference number: [id number].   6/15/2025 9:05:36 PM CDT

## 2025-06-17 NOTE — PROGRESS NOTES
Urogyn follow up  06/18/2025  .  [facility] - UROGYNECOLOGY  [address] [address]    Nicole Jacques  [id number]  1950    Nicole Jacques is a 75 y.o. here for a urogyn follow up.    Last HPI from 08/09/2023  1)  UI:  (--) ROCHELLE < (+) UUI  X 5years.  (+) pads:3/day, usually moderate wetness  Daytime frequency: Q 3-4 hours.  Nocturia: Yes: 3-4 night.  Does not limit fluids prior to bedtime. (--) dysuria,  (--) hematuria,  (--) frequent UTIs.  (+) complete bladder emptying. Has tried oxybutynin in the past-- did not tolerate due to drying effects (did help with urgency/ frequency)     2)  POP:  Absent. Symptoms:(--)    (--) vaginal bleeding. (--) vaginal discharge. (+) sexually active.  (--) dyspareunia.   (+)  Vaginal dryness.  (+) vaginal estrogen use.   POP-Q:  Aa -1; Ba -1; C -7; Ap -3; Bp -3; D n/a.  Genital hiatus 3, perineal body 2 total vaginal length 9   Pvr 5 mL     3)  BM:  (--) constipation/straining.  (--) chronic diarrhea. (--) hematochezia.  (--) fecal incontinence.  (--) fecal smearing/urgency.  (+) complete evacuation.  Takes magnesium supplement     02/19/2025  1)  Mixed urinary incontinence, urge > stress:    --voiding every 2 hours during the day and 2/night. Does not always limit fluids prior to bedtime.   --using 2-3 pads/ day with severe wetness.     2)vaginal atrophy  --using vaginal estrogen cream      3)midline cystocele stage 2  --asymtpomatic  --pvr 5 mL     4/16/2025:  1)  Mixed urinary incontinence, urge > stress:    --took myrbetriq x 2 weeks-- felt like she was having dry mouth, eyes  --stopped oxybutynin in the past due to side effects  --gemtesa was not affordable  -- gets up 1x/night for bathroom, moderate wetness  -- tries to limit fluids after 7pm  --voiding every 3 hours during the day  -- using 2 pads/ day with moderate wetness     2)vaginal atrophy  --using vaginal estrogen cream 3 times weekly     3)midline cystocele stage 2  --asymtpomatic      5/27/2025:  -- gemtesa not affordable, mybetriq did not work, failed oxybutynin due to side effects  -- completed pelvic floor PT, intermittently does exercises at home  -- using vaginal estrogen cream every day  -- here for pessary fitting    Changes from last visit:  -- using #4 ring with support pessary. Removing and washing on her own  -- No vaginal bleeding, discharge, or pain  -- urinary urgency and frequency significantly improved with pessary in place  -- having some constipation, taking ducolax intermittently.     Past Medical History:   Diagnosis Date    Anxiety     Cataract     Depression     Family history of colon cancer     GERD (gastroesophageal reflux disease)     Hypertension     Lumbar spondylosis     Lumbar spondylosis     Overactive bladder     Urge urinary incontinence        Past Surgical History:   Procedure Laterality Date    APPENDECTOMY      CATARACT EXTRACTION W/  INTRAOCULAR LENS IMPLANT Left 06/03/2014    Dr Drake    CATARACT EXTRACTION W/  INTRAOCULAR LENS IMPLANT Right 07/07/2014    COLONOSCOPY N/A 11/02/2015    Procedure: COLONOSCOPY;  Surgeon: Desmond Casillas MD;  [facility]  Service: Endoscopy;  Laterality: N/A;    COLONOSCOPY N/A 03/16/2021    Procedure: COLONOSCOPY;  Surgeon: Desmond Casillas MD;  Location: [facility] ENDO (4TH FLR);  Service: Endoscopy;  Laterality: N/A;  covid test 3/13 primary care, instructions emailed-KPvt    EYE SURGERY      HYSTERECTOMY      partial    KNEE SURGERY Left     TUBAL LIGATION         Family History   Problem Relation Name Age of Onset    Diabetes Paternal Grandmother      Diabetes Maternal Grandmother      Dementia Father      Cancer Father          bladder    Basal cell carcinoma Father      Hypertension Mother      Kidney disease Mother      Heart disease Mother      Diabetes Brother      Hypertension Brother      Hypertension Sister      Diabetes Sister      Hypertension Sister      Breast cancer Sister  54         recurrence at age 64    Diabetes Sister      No Known Problems Daughter Sarai     No Known Problems Son Andrews     Cancer Maternal Uncle          colon cancer    Amblyopia Neg Hx      Blindness Neg Hx      Cataracts Neg Hx      Glaucoma Neg Hx      Macular degeneration Neg Hx      Retinal detachment Neg Hx      Strabismus Neg Hx      Melanoma Neg Hx         Social History     Socioeconomic History    Marital status:    Tobacco Use    Smoking status: Never     Passive exposure: Yes    Smokeless tobacco: Never   Substance and Sexual Activity    Alcohol use: Yes     Comment: Social.    Drug use: No    Sexual activity: Yes     Partners: Male     Comment: , together x 54 years   Other Topics Concern    Are you pregnant or think you may be? No    Breast-feeding No     Social Drivers of Health     Financial Resource Strain: Low Risk  (4/30/2025)    Overall Financial Resource Strain (CARDIA)     Difficulty of Paying Living Expenses: Not very hard   Food Insecurity: No Food Insecurity (4/30/2025)    Hunger Vital Sign     Worried About Running Out of Food in the Last Year: Never true     Ran Out of Food in the Last Year: Never true   Transportation Needs: Unknown (4/30/2025)    PRAPARE - Transportation     Lack of Transportation (Medical): No   Physical Activity: Unknown (4/30/2025)    Exercise Vital Sign     Days of Exercise per Week: 3 days   Stress: Stress Concern Present (4/30/2025)    [employer] [place] [employer] - Occupational Stress Questionnaire     Feeling of Stress : Rather much   Housing Stability: Low Risk  (4/30/2025)    Housing Stability Vital Sign     Unable to Pay for Housing in the Last Year: No     Number of Times Moved in the Last Year: 0     Homeless in the Last Year: No       Current Medications[1]    Review of patient's allergies indicates:   Allergen Reactions    Sulfamethoxazole-trimethoprim Nausea And Vomiting and Other (See Comments)     Other reaction(s): Migraine  "    ROS:  As per HPI.      Exam  /66 (BP Location: Left arm, Patient Position: Sitting)   Pulse 79   Ht 5' 5" (1.651 m)   Wt 71.5 kg (157 lb 10.1 oz)   BMI 26.23 kg/m²   General: alert and oriented, no acute distress  Respiratory: normal respiratory effort  Abd: soft, non-tender, non-distended    Pelvic  Ext. Genitalia: normal external genitalia. Normal bartholin's and skeens glands  Vagina: + atrophy. Normal vaginal mucosa without lesions. No discharge noted.   Non-tender bladder base without palpable mass.  Cervix: absent  Uterus:  absent   Urethra: no masses or tenderness  Urethral meatus: no lesions, caruncle or prolapse.    #4 ring with support pessary inserted (had taken it out last night and left it out)    Impression  1. Pessary maintenance        2. Urge incontinence        3. Midline cystocele        4. Vaginal dryness          We reviewed the above issues and discussed options for short-term versus long-term management of her problems.   Plan:     1)  Mixed urinary incontinence, urge > stress:    --Empty bladder every 3 hours.  Empty well: wait a minute, lean forward on toilet.    --Avoid dietary irritants (see sheet).  Keep diary x 3-5 days to determine your irritants.  --KEGELS: do 10 in AM and 10 in PM, holding each x 10 seconds.  When you feel urge to go, STOP, KEGEL, and when urge has passed, then go to bathroom.  --URGE: --failed gemtesa, myrbetriq, and oxybutynin. Completed 6 sessions of PT. Continue using #4 ring with support pessary.  Patient able to insert and remove on her own.     -- PESSARY CARE: Remove pessary as frequently as nightly and as infrequently as every 2-3 weeks.  Remove before intercourse.  May need to remove before bowel movements if straining dislodges.   Wash with soap and water (no ).  Replace using small amount of water-based lubricant (like KY jelly) at end being introduced into vagina.     --to insert:  Fold in half, push down behind cervix and back to " back of vagina. Lift up under pubic bone.  --to remove: grab rim with forefinger and thumb, pull down and twist out        2)vaginal atrophy  --continue vaginal estrogen twice a week     3)midline cystocele stage 2  -- asymtpomatic  -- pessary     4)RTC for 6 mo pessary check    I spent a total of 30 minutes on the day of the visit.  This includes face to face time and non-face to face time preparing to see the patient (eg, review of tests), obtaining and/or reviewing separately obtained history, documenting clinical information in the electronic or other health record, independently interpreting results and communicating results to the patient/family/caregiver, or care coordinator.     Nicole Rodriguez PA-C  [facility]  Division of Female Pelvic Medicine and Reconstructive Surgery  Department of Obstetrics & Gynecology       [1]   Current Outpatient Medications   Medication Sig Dispense Refill    amLODIPine (NORVASC) 10 MG tablet TAKE 1 TABLET BY MOUTH ONCE  DAILY 90 tablet 1    ascorbic acid, vitamin C, (VITAMIN C) 1000 MG tablet Take 2 tablets by mouth every morning. Takes two 1000 mg tablets every morning      aspirin (ECOTRIN) 81 MG EC tablet Take 81 mg by mouth every morning.      busPIRone (BUSPAR) 15 MG tablet Take 1 tablet (15 mg total) by mouth 2 (two) times daily. 60 tablet 11    co-enzyme Q-10 30 mg capsule Take 30 mg by mouth every morning.       EScitalopram oxalate (LEXAPRO) 20 MG tablet TAKE 1 TABLET BY MOUTH ONCE  DAILY 90 tablet 1    estradioL (ESTRACE) 0.01 % (0.1 mg/gram) vaginal cream Place 1 g vaginally 3 (three) times a week. 42 g 3    estradioL (ESTRACE) 0.01 % (0.1 mg/gram) vaginal cream Place 1 g vaginally once daily. 42.5 g 3    losartan (COZAAR) 100 MG tablet Take 1 tablet (100 mg total) by mouth once daily. 90 tablet 1    MULTIVITAMIN (MULTIPLE VITAMIN ESSENTIAL ORAL) Take 1 tablet by mouth every morning.       omeprazole (PRILOSEC) 20 MG capsule TAKE 1 CAPSULE BY MOUTH ONCE   DAILY 90 capsule 2    pravastatin (PRAVACHOL) 10 MG tablet TAKE 1 TABLET BY MOUTH ONCE  DAILY FOR VASCULAR HEALTH 90 tablet 1    traZODone (DESYREL) 50 MG tablet Take 1 tablet (50 mg total) by mouth every evening. 30 tablet 3    VITAMIN D3 5,000 unit Tab Take 5,000 Units by mouth every morning.       ALPRAZolam (XANAX) 1 MG tablet Take 1 tablet (1 mg total) by mouth daily as needed for Anxiety. 30 tablet 0    estradioL (ESTRACE) 0.5 MG tablet TAKE 1 TABLET BY MOUTH ONCE  DAILY 90 tablet 0    mirabegron (MYRBETRIQ) 50 mg Tb24 Take 1 tablet (50 mg total) by mouth once daily. 30 tablet 11    primidone (MYSOLINE) 50 MG Tab Take 1 tablet (50 mg total) by mouth once daily. 30 tablet 11     No current facility-administered medications for this visit.

## 2025-06-18 ENCOUNTER — PATIENT MESSAGE (OUTPATIENT)
Dept: UROGYNECOLOGY | Facility: CLINIC | Age: 75
End: 2025-06-18

## 2025-06-18 ENCOUNTER — OFFICE VISIT (OUTPATIENT)
Dept: UROGYNECOLOGY | Facility: CLINIC | Age: 75
End: 2025-06-18
Payer: MEDICARE

## 2025-06-18 VITALS
DIASTOLIC BLOOD PRESSURE: 66 MMHG | WEIGHT: 157.63 LBS | SYSTOLIC BLOOD PRESSURE: 107 MMHG | BODY MASS INDEX: 26.26 KG/M2 | HEIGHT: 65 IN | HEART RATE: 79 BPM

## 2025-06-18 DIAGNOSIS — N81.11 MIDLINE CYSTOCELE: ICD-10-CM

## 2025-06-18 DIAGNOSIS — N89.8 VAGINAL DRYNESS: ICD-10-CM

## 2025-06-18 DIAGNOSIS — N39.41 URGE INCONTINENCE: ICD-10-CM

## 2025-06-18 DIAGNOSIS — Z46.89 PESSARY MAINTENANCE: Primary | ICD-10-CM

## 2025-06-18 PROCEDURE — 99999 PR PBB SHADOW E&M-EST. PATIENT-LVL IV: CPT | Mod: PBBFAC,,,

## 2025-06-18 PROCEDURE — 99214 OFFICE O/P EST MOD 30 MIN: CPT | Mod: S$GLB,,,

## 2025-06-18 PROCEDURE — 3078F DIAST BP <80 MM HG: CPT | Mod: CPTII,S$GLB,,

## 2025-06-18 PROCEDURE — 1101F PT FALLS ASSESS-DOCD LE1/YR: CPT | Mod: CPTII,S$GLB,,

## 2025-06-18 PROCEDURE — 1126F AMNT PAIN NOTED NONE PRSNT: CPT | Mod: CPTII,S$GLB,,

## 2025-06-18 PROCEDURE — 3074F SYST BP LT 130 MM HG: CPT | Mod: CPTII,S$GLB,,

## 2025-06-18 PROCEDURE — 1159F MED LIST DOCD IN RCRD: CPT | Mod: CPTII,S$GLB,,

## 2025-06-18 PROCEDURE — 3288F FALL RISK ASSESSMENT DOCD: CPT | Mod: CPTII,S$GLB,,

## 2025-06-18 NOTE — PATIENT INSTRUCTIONS
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  Empty well: wait a minute, lean forward on toilet.    --Avoid dietary irritants (see sheet).  Keep diary x 3-5 days to determine your irritants.  --KEGELS: do 10 in AM and 10 in PM, holding each x 10 seconds.  When you feel urge to go, STOP, KEGEL, and when urge has passed, then go to bathroom.  --URGE: --failed gemtesa, myrbetriq, and oxybutynin. Completed 6 sessions of PT. Continue using #4 ring with support pessary.  Patient able to insert and remove on her own.     -- PESSARY CARE: Remove pessary as frequently as nightly and as infrequently as every 2-3 weeks.  Remove before intercourse.  May need to remove before bowel movements if straining dislodges.   Wash with soap and water (no ).  Replace using small amount of water-based lubricant (like KY jelly) at end being introduced into vagina.     --to insert:  Fold in half, push down behind cervix and back to back of vagina. Lift up under pubic bone.  --to remove: grab rim with forefinger and thumb, pull down and twist out        2)vaginal atrophy  --continue vaginal estrogen twice a week     3)midline cystocele stage 2  -- asymtpomatic  -- pessary     4)RTC for 6 mo pessary check

## 2025-06-20 ENCOUNTER — OFFICE VISIT (OUTPATIENT)
Dept: PRIMARY CARE CLINIC | Facility: CLINIC | Age: 75
End: 2025-06-20
Payer: MEDICARE

## 2025-06-20 VITALS
OXYGEN SATURATION: 96 % | WEIGHT: 155.44 LBS | HEIGHT: 65 IN | DIASTOLIC BLOOD PRESSURE: 60 MMHG | HEART RATE: 79 BPM | SYSTOLIC BLOOD PRESSURE: 118 MMHG | BODY MASS INDEX: 25.9 KG/M2

## 2025-06-20 DIAGNOSIS — F41.8 DEPRESSION WITH ANXIETY: ICD-10-CM

## 2025-06-20 DIAGNOSIS — F13.20 SEDATIVE, HYPNOTIC OR ANXIOLYTIC DEPENDENCE, UNCOMPLICATED: ICD-10-CM

## 2025-06-20 DIAGNOSIS — Z00.00 WELLNESS EXAMINATION: Primary | ICD-10-CM

## 2025-06-20 DIAGNOSIS — K21.9 GASTROESOPHAGEAL REFLUX DISEASE WITHOUT ESOPHAGITIS: ICD-10-CM

## 2025-06-20 DIAGNOSIS — F41.1 GAD (GENERALIZED ANXIETY DISORDER): ICD-10-CM

## 2025-06-20 DIAGNOSIS — I10 ESSENTIAL HYPERTENSION: ICD-10-CM

## 2025-06-20 DIAGNOSIS — F43.21 ADJUSTMENT DISORDER WITH DEPRESSED MOOD: ICD-10-CM

## 2025-06-20 DIAGNOSIS — D64.9 NORMOCYTIC ANEMIA: ICD-10-CM

## 2025-06-20 DIAGNOSIS — R73.03 PRE-DIABETES: ICD-10-CM

## 2025-06-20 PROCEDURE — 99999 PR PBB SHADOW E&M-EST. PATIENT-LVL IV: CPT | Mod: PBBFAC,,, | Performed by: NURSE PRACTITIONER

## 2025-06-20 NOTE — PROGRESS NOTES
[facility] Note    Chief Complaint    No chief complaint on file.      History of Present Illness      Nicole Jacques is a 75 y.o. female who presents today for No chief complaint on file.        Ms. Jacques presents to the clinic today for her 6 month check up. She reports feeling well except for her anxiety. She is seen by psychiatry and has almost completely been weaned off of xanax. She was started on Buspar. Otherwise, no recent illness.          Review of Systems   Constitutional:  Negative for chills, fever, malaise/fatigue and weight loss.   HENT:  Negative for congestion, hearing loss, sinus pain, sore throat and tinnitus.    Eyes:  Negative for blurred vision and double vision.   Respiratory:  Negative for cough, shortness of breath and wheezing.    Cardiovascular:  Negative for chest pain, palpitations and leg swelling.   Gastrointestinal:  Negative for abdominal pain, constipation, diarrhea, heartburn, nausea and vomiting.   Genitourinary:  Negative for dysuria.   Musculoskeletal:  Negative for falls, joint pain and myalgias.   Skin:  Negative for itching and rash.   Neurological:  Negative for dizziness and headaches.   Psychiatric/Behavioral:  Positive for depression. The patient is nervous/anxious and has insomnia.         Family History:  family history includes Basal cell carcinoma in her father; Breast cancer (age of onset: 54) in her sister; Cancer in her father and maternal uncle; Dementia in her father; Diabetes in her brother, maternal grandmother, paternal grandmother, sister, and sister; Heart disease in her mother; Hypertension in her brother, mother, sister, and sister; Kidney disease in her mother; No Known Problems in her daughter and son.   Family history was reviewed with patient.     Medications:  Encounter Medications[1]    Allergies:  Review of patient's allergies indicates:   Allergen Reactions    Sulfamethoxazole-trimethoprim Nausea And Vomiting and Other (See  "Comments)     Other reaction(s): Migraine       Health Maintenance:  Health Maintenance   Topic Date Due    Hemoglobin A1c (Prediabetes)  12/19/2025    DEXA Scan  10/06/2026    TETANUS VACCINE  05/01/2029    Lipid Panel  12/19/2029    Hepatitis C Screening  Completed    Shingles Vaccine  Completed    Influenza Vaccine  Completed    COVID-19 Vaccine  Completed    RSV Vaccine (Age 60+ and Pregnant patients)  Completed    Pneumococcal Vaccines (Age 50+)  Completed     Health Maintenance Topics with due status: Not Due       Topic Last Completion Date    TETANUS VACCINE 05/01/2019    DEXA Scan 10/06/2023    Hemoglobin A1c (Prediabetes) 12/19/2024    Lipid Panel 12/19/2024       Physical Exam      Vital Signs  Pulse: 79  SpO2: 96 %  BP: 118/60  BP Location: Left arm  Patient Position: Sitting  Pain Score: 0-No pain  Height and Weight  Height: 5' 5" (165.1 cm)  Weight: 70.5 kg (155 lb 6.8 oz)  BSA (Calculated - sq m): 1.8 sq meters  BMI (Calculated): 25.9  Weight in (lb) to have BMI = 25: 149.9]    Physical Exam  Vitals reviewed.   Constitutional:       General: She is not in acute distress.     Appearance: Normal appearance. She is normal weight.   HENT:      Head: Normocephalic and atraumatic.      Right Ear: Tympanic membrane and ear canal normal. There is no impacted cerumen.      Left Ear: Tympanic membrane and ear canal normal. There is no impacted cerumen.      Nose: Nose normal. No congestion or rhinorrhea.      Mouth/Throat:      Mouth: Mucous membranes are moist.      Pharynx: Oropharynx is clear. No oropharyngeal exudate or posterior oropharyngeal erythema.   Eyes:      Extraocular Movements: Extraocular movements intact.      Conjunctiva/sclera: Conjunctivae normal.      Pupils: Pupils are equal, round, and reactive to light.   Cardiovascular:      Rate and Rhythm: Normal rate and regular rhythm.      Pulses: Normal pulses.      Heart sounds: Normal heart sounds. No murmur heard.  Pulmonary:      Effort: " Pulmonary effort is normal. No respiratory distress.      Breath sounds: Normal breath sounds. No wheezing.   Abdominal:      General: Abdomen is flat. Bowel sounds are normal. There is no distension.      Tenderness: There is no abdominal tenderness.   Musculoskeletal:         General: Normal range of motion.      Cervical back: Normal range of motion and neck supple.   Skin:     General: Skin is warm and dry.      Capillary Refill: Capillary refill takes less than 2 seconds.      Findings: No rash.   Neurological:      General: No focal deficit present.      Mental Status: She is alert and oriented to person, place, and time.      Comments: Nervous and anxious   Psychiatric:         Mood and Affect: Mood normal.         Behavior: Behavior normal.         Thought Content: Thought content normal.            Assessment/Plan     Nicole Jacques is a 75 y.o.female with:  Annual wellness/preventative exam     Health maintenance reviewed with patient in detail including any recent labs and studies and needs for future screening labs. Age-appropriate vaccines and other age-appropriate screening studies reviewed with patient in detail. Sleep health reviewed with patient. Skin health regarding possible skin cancer screening reviewed with patient. General regularity of bowel movements and urinations reviewed with patient including any possibility of urine leakage. Vision screening reviewed with patient.    Most recent some lab results reviewed with patient.  Any new prescription medications gone over in detail including reason for taking the medication, most common possible side effects and possible costs, etcetera.       Chronic conditions updated. Other than changes or additions as above, cont current medications and maintain follow-up with specialists if indicated.   Wellness examination  -     Iron and TIBC; Future; Expected date: 06/20/2025  -     CBC Auto Differential; Future; Expected date: 06/20/2025  -      Comprehensive Metabolic Panel; Future; Expected date: 06/20/2025  -     Hemoglobin A1C; Future; Expected date: 06/20/2025  -     Lipid Panel; Future; Expected date: 06/20/2025  -     TSH; Future; Expected date: 06/20/2025    Essential hypertension controlled       -   BP typically normal, continue Amlodipine 10, Losartan 100 mg daily.     Pre-diabetes   counseled on diet.      Chronic kidney disease, stage 3a - avoiding NSAIDS, stay well hydrated.      Sedative, hypnotic or anxiolytic dependence, uncomplicated   being weaned off of xanax;   started on buspar   Major depressive disorder, recurrent, mild - stable on Lexapro 20 mg daily.     ROSALIA (generalized anxiety disorder)  Pt followed by psychiatry. Weaning xanax.  Started buspar.  monitor  -     ALPRAZolam (XANAX) 2 MG Tab; Take 1 tablet (2 mg total) by mouth daily as needed (Anxiety.). as needed for anxiety.  MCI (mild cognitive impairment) with memory loss - continue Primidone.    stable     Benign essential tremor   stable  Bladder incontinence  Pessary inserted  improvement subjective   Follow up in about 6 months (around 12/7/2025).      As above, continue current medications and maintain follow up with specialists.  Return to clinic as needed.    Greater than 50% of visit was spent face to face with patient.  All questions were answered to patient's satisfaction.          Laurie C Ray, NP-C [facility]                     [1]   Outpatient Encounter Medications as of 6/20/2025   Medication Sig Dispense Refill    ALPRAZolam (XANAX) 1 MG tablet Take 1 tablet (1 mg total) by mouth daily as needed for Anxiety. 30 tablet 0    amLODIPine (NORVASC) 10 MG tablet TAKE 1 TABLET BY MOUTH ONCE  DAILY 90 tablet 1    ascorbic acid, vitamin C, (VITAMIN C) 1000 MG tablet Take 2 tablets by mouth every morning. Takes two 1000 mg tablets every morning      aspirin (ECOTRIN) 81 MG EC tablet Take 81 mg by mouth every morning.      busPIRone (BUSPAR) 15 MG tablet Take 1  tablet (15 mg total) by mouth 2 (two) times daily. 60 tablet 11    co-enzyme Q-10 30 mg capsule Take 30 mg by mouth every morning.       EScitalopram oxalate (LEXAPRO) 20 MG tablet TAKE 1 TABLET BY MOUTH ONCE  DAILY 90 tablet 1    estradioL (ESTRACE) 0.01 % (0.1 mg/gram) vaginal cream Place 1 g vaginally 3 (three) times a week. 42 g 3    estradioL (ESTRACE) 0.01 % (0.1 mg/gram) vaginal cream Place 1 g vaginally once daily. 42.5 g 3    losartan (COZAAR) 100 MG tablet Take 1 tablet (100 mg total) by mouth once daily. 90 tablet 1    MULTIVITAMIN (MULTIPLE VITAMIN ESSENTIAL ORAL) Take 1 tablet by mouth every morning.       omeprazole (PRILOSEC) 20 MG capsule TAKE 1 CAPSULE BY MOUTH ONCE  DAILY 90 capsule 2    pravastatin (PRAVACHOL) 10 MG tablet TAKE 1 TABLET BY MOUTH ONCE  DAILY FOR VASCULAR HEALTH 90 tablet 1    traZODone (DESYREL) 50 MG tablet Take 1 tablet (50 mg total) by mouth every evening. 30 tablet 3    VITAMIN D3 5,000 unit Tab Take 5,000 Units by mouth every morning.       estradioL (ESTRACE) 0.5 MG tablet TAKE 1 TABLET BY MOUTH ONCE  DAILY 90 tablet 0    mirabegron (MYRBETRIQ) 50 mg Tb24 Take 1 tablet (50 mg total) by mouth once daily. 30 tablet 11    primidone (MYSOLINE) 50 MG Tab Take 1 tablet (50 mg total) by mouth once daily. (Patient not taking: Reported on 6/20/2025) 30 tablet 11    [DISCONTINUED] EScitalopram oxalate (LEXAPRO) 20 MG tablet TAKE 1 TABLET BY MOUTH ONCE  DAILY 90 tablet 2     No facility-administered encounter medications on file as of 6/20/2025.

## 2025-06-22 ENCOUNTER — PATIENT MESSAGE (OUTPATIENT)
Dept: PRIMARY CARE CLINIC | Facility: CLINIC | Age: 75
End: 2025-06-22
Payer: MEDICARE

## 2025-06-23 ENCOUNTER — LAB VISIT (OUTPATIENT)
Dept: LAB | Facility: HOSPITAL | Age: 75
End: 2025-06-23
Attending: NURSE PRACTITIONER
Payer: MEDICARE

## 2025-06-23 DIAGNOSIS — Z00.00 WELLNESS EXAMINATION: ICD-10-CM

## 2025-06-23 LAB
ABSOLUTE EOSINOPHIL (OHS): 0.08 K/UL
ABSOLUTE MONOCYTE (OHS): 0.3 K/UL (ref 0.3–1)
ABSOLUTE NEUTROPHIL COUNT (OHS): 3.82 K/UL (ref 1.8–7.7)
ALBUMIN SERPL BCP-MCNC: 4.3 G/DL (ref 3.5–5.2)
ALP SERPL-CCNC: 60 UNIT/L (ref 38–126)
ALT SERPL W/O P-5'-P-CCNC: 20 UNIT/L (ref 10–44)
ANION GAP (OHS): 9 MMOL/L (ref 8–16)
AST SERPL-CCNC: 32 UNIT/L (ref 15–46)
BASOPHILS # BLD AUTO: 0.01 K/UL
BASOPHILS NFR BLD AUTO: 0.2 %
BILIRUB SERPL-MCNC: 0.6 MG/DL (ref 0.1–1)
BUN SERPL-MCNC: 30 MG/DL (ref 7–17)
CALCIUM SERPL-MCNC: 8.9 MG/DL (ref 8.7–10.5)
CHLORIDE SERPL-SCNC: 104 MMOL/L (ref 95–110)
CHOLEST SERPL-MCNC: 174 MG/DL (ref 120–199)
CHOLEST/HDLC SERPL: 2.6 {RATIO} (ref 2–5)
CO2 SERPL-SCNC: 27 MMOL/L (ref 23–29)
CREAT SERPL-MCNC: 1 MG/DL (ref 0.5–1.4)
EAG (OHS): 134 MG/DL (ref 68–131)
ERYTHROCYTE [DISTWIDTH] IN BLOOD BY AUTOMATED COUNT: 12.9 % (ref 11.5–14.5)
GFR SERPLBLD CREATININE-BSD FMLA CKD-EPI: 59 ML/MIN/1.73/M2
GLUCOSE SERPL-MCNC: 134 MG/DL (ref 70–110)
HBA1C MFR BLD: 6.3 % (ref 4–5.6)
HCT VFR BLD AUTO: 36.7 % (ref 37–48.5)
HDLC SERPL-MCNC: 67 MG/DL (ref 40–75)
HDLC SERPL: 38.5 % (ref 20–50)
HGB BLD-MCNC: 12.1 GM/DL (ref 12–16)
IMM GRANULOCYTES # BLD AUTO: 0.01 K/UL (ref 0–0.04)
IMM GRANULOCYTES NFR BLD AUTO: 0.2 % (ref 0–0.5)
IRON SATN MFR SERPL: 25 % (ref 20–50)
IRON SERPL-MCNC: 95 UG/DL (ref 30–160)
LDLC SERPL CALC-MCNC: 93.8 MG/DL (ref 63–159)
LYMPHOCYTES # BLD AUTO: 0.86 K/UL (ref 1–4.8)
MCH RBC QN AUTO: 30.7 PG (ref 27–31)
MCHC RBC AUTO-ENTMCNC: 33 G/DL (ref 32–36)
MCV RBC AUTO: 93 FL (ref 82–98)
NONHDLC SERPL-MCNC: 107 MG/DL
NUCLEATED RBC (/100WBC) (OHS): 0 /100 WBC
PLATELET # BLD AUTO: 216 K/UL (ref 150–450)
PMV BLD AUTO: 11.4 FL (ref 9.2–12.9)
POTASSIUM SERPL-SCNC: 3.7 MMOL/L (ref 3.5–5.1)
PROT SERPL-MCNC: 9 GM/DL (ref 6–8.4)
RBC # BLD AUTO: 3.94 M/UL (ref 4–5.4)
RELATIVE EOSINOPHIL (OHS): 1.6 %
RELATIVE LYMPHOCYTE (OHS): 16.9 % (ref 18–48)
RELATIVE MONOCYTE (OHS): 5.9 % (ref 4–15)
RELATIVE NEUTROPHIL (OHS): 75.2 % (ref 38–73)
SODIUM SERPL-SCNC: 140 MMOL/L (ref 136–145)
TIBC SERPL-MCNC: 376 UG/DL (ref 250–450)
TRANSFERRIN SERPL-MCNC: 254 MG/DL (ref 200–375)
TRIGL SERPL-MCNC: 66 MG/DL (ref 30–150)
TSH SERPL-ACNC: 1.08 UIU/ML (ref 0.4–4)
WBC # BLD AUTO: 5.08 K/UL (ref 3.9–12.7)

## 2025-06-23 PROCEDURE — 36415 COLL VENOUS BLD VENIPUNCTURE: CPT | Mod: PN

## 2025-06-23 PROCEDURE — 80061 LIPID PANEL: CPT | Mod: PN

## 2025-06-23 PROCEDURE — 84520 ASSAY OF UREA NITROGEN: CPT | Mod: PN

## 2025-06-23 PROCEDURE — 84443 ASSAY THYROID STIM HORMONE: CPT | Mod: PN

## 2025-06-23 PROCEDURE — 83036 HEMOGLOBIN GLYCOSYLATED A1C: CPT | Mod: PN

## 2025-06-23 PROCEDURE — 85025 COMPLETE CBC W/AUTO DIFF WBC: CPT | Mod: PN

## 2025-06-23 PROCEDURE — 83540 ASSAY OF IRON: CPT | Mod: PN

## 2025-06-24 ENCOUNTER — RESULTS FOLLOW-UP (OUTPATIENT)
Dept: PRIMARY CARE CLINIC | Facility: CLINIC | Age: 75
End: 2025-06-24

## 2025-07-16 DIAGNOSIS — I99.8 WHITE MATTER DISEASE OF BRAIN DUE TO ISCHEMIA: ICD-10-CM

## 2025-07-16 DIAGNOSIS — R90.82 WHITE MATTER DISEASE OF BRAIN DUE TO ISCHEMIA: ICD-10-CM

## 2025-07-16 RX ORDER — PRAVASTATIN SODIUM 10 MG/1
TABLET ORAL
Qty: 90 TABLET | Refills: 1 | Status: SHIPPED | OUTPATIENT
Start: 2025-07-16

## 2025-07-17 NOTE — TELEPHONE ENCOUNTER
No care due was identified.  [facility] Embedded Care Due Messages. Reference number: [id number].   7/16/2025 9:37:29 PM CDT

## 2025-07-17 NOTE — TELEPHONE ENCOUNTER
Refill Decision Note   Nicole Websterain  is requesting a refill authorization.  Brief Assessment and Rationale for Refill:  Approve     Medication Therapy Plan:         Comments:     Note composed:10:21 PM 07/16/2025

## 2025-08-04 DIAGNOSIS — F41.1 GAD (GENERALIZED ANXIETY DISORDER): ICD-10-CM

## 2025-08-05 ENCOUNTER — PATIENT MESSAGE (OUTPATIENT)
Dept: PSYCHIATRY | Facility: CLINIC | Age: 75
End: 2025-08-05
Payer: MEDICARE

## 2025-08-05 RX ORDER — TRAZODONE HYDROCHLORIDE 50 MG/1
50 TABLET ORAL NIGHTLY
Qty: 30 TABLET | Refills: 3 | Status: SHIPPED | OUTPATIENT
Start: 2025-08-05 | End: 2025-12-03

## 2025-08-12 ENCOUNTER — TELEPHONE (OUTPATIENT)
Dept: PRIMARY CARE CLINIC | Facility: CLINIC | Age: 75
End: 2025-08-12
Payer: MEDICARE

## 2025-08-12 DIAGNOSIS — Z12.31 ENCOUNTER FOR SCREENING MAMMOGRAM FOR BREAST CANCER: ICD-10-CM

## 2025-08-14 DIAGNOSIS — I10 ESSENTIAL HYPERTENSION: ICD-10-CM

## 2025-08-14 RX ORDER — AMLODIPINE BESYLATE 10 MG/1
10 TABLET ORAL
Qty: 90 TABLET | Refills: 1 | Status: SHIPPED | OUTPATIENT
Start: 2025-08-14

## 2025-08-25 ENCOUNTER — OFFICE VISIT (OUTPATIENT)
Dept: PSYCHIATRY | Facility: CLINIC | Age: 75
End: 2025-08-25
Payer: MEDICARE

## 2025-08-25 VITALS
WEIGHT: 162.38 LBS | SYSTOLIC BLOOD PRESSURE: 124 MMHG | HEART RATE: 60 BPM | BODY MASS INDEX: 27.02 KG/M2 | DIASTOLIC BLOOD PRESSURE: 76 MMHG

## 2025-08-25 DIAGNOSIS — I10 PRIMARY HYPERTENSION: ICD-10-CM

## 2025-08-25 DIAGNOSIS — G25.0 BENIGN ESSENTIAL TREMOR: ICD-10-CM

## 2025-08-25 DIAGNOSIS — F41.9 ANXIETY DISORDER, UNSPECIFIED TYPE: Primary | ICD-10-CM

## 2025-08-25 DIAGNOSIS — F33.1 DEPRESSION, MAJOR, RECURRENT, MODERATE: ICD-10-CM

## 2025-08-25 PROCEDURE — 99215 OFFICE O/P EST HI 40 MIN: CPT | Mod: S$GLB,,, | Performed by: PSYCHIATRY & NEUROLOGY

## 2025-08-25 PROCEDURE — 99417 PROLNG OP E/M EACH 15 MIN: CPT | Mod: S$GLB,,, | Performed by: PSYCHIATRY & NEUROLOGY

## 2025-08-25 PROCEDURE — 99999 PR PBB SHADOW E&M-EST. PATIENT-LVL II: CPT | Mod: PBBFAC,,, | Performed by: PSYCHIATRY & NEUROLOGY

## 2025-08-25 PROCEDURE — 3074F SYST BP LT 130 MM HG: CPT | Mod: CPTII,S$GLB,, | Performed by: PSYCHIATRY & NEUROLOGY

## 2025-08-25 PROCEDURE — 1160F RVW MEDS BY RX/DR IN RCRD: CPT | Mod: CPTII,S$GLB,, | Performed by: PSYCHIATRY & NEUROLOGY

## 2025-08-25 PROCEDURE — 1159F MED LIST DOCD IN RCRD: CPT | Mod: CPTII,S$GLB,, | Performed by: PSYCHIATRY & NEUROLOGY

## 2025-08-25 PROCEDURE — 3078F DIAST BP <80 MM HG: CPT | Mod: CPTII,S$GLB,, | Performed by: PSYCHIATRY & NEUROLOGY

## 2025-08-25 RX ORDER — LAMOTRIGINE 25 MG/1
TABLET ORAL
Qty: 49 TABLET | Refills: 0 | Status: SHIPPED | OUTPATIENT
Start: 2025-08-25

## 2025-08-25 RX ORDER — LAMOTRIGINE 100 MG/1
TABLET ORAL
Qty: 30 TABLET | Refills: 1 | Status: SHIPPED | OUTPATIENT
Start: 2025-08-25

## 2025-08-25 RX ORDER — ALPRAZOLAM 0.5 MG/1
0.5 TABLET ORAL 2 TIMES DAILY PRN
Qty: 60 TABLET | Refills: 1 | Status: CANCELLED | OUTPATIENT
Start: 2025-08-25 | End: 2025-10-24

## 2025-08-25 RX ORDER — ALPRAZOLAM 0.5 MG/1
0.5 TABLET ORAL 2 TIMES DAILY PRN
Qty: 60 TABLET | Refills: 1 | Status: SHIPPED | OUTPATIENT
Start: 2025-08-25 | End: 2025-10-24

## (undated) DEVICE — BLADE SURG #15 CARBON STEEL

## (undated) DEVICE — GAUZE SPONGE 4X4 12PLY

## (undated) DEVICE — NDL HYPO A BEVEL 22X1 1/2

## (undated) DEVICE — SOL WATER STRL IRR 1000ML

## (undated) DEVICE — SKINMARKER & RULER REGULAR X-F

## (undated) DEVICE — APPLICATOR STERILE 3IN

## (undated) DEVICE — CUP MEDICINE STERILE 2OZ

## (undated) DEVICE — CLEANER TIP ELECSURG 2X2IN

## (undated) DEVICE — TRAY MUSCLE LID EYE

## (undated) DEVICE — SEE MEDLINE ITEM 152622

## (undated) DEVICE — DRAPE STERI INSTRUMENT 1018

## (undated) DEVICE — SOL BSS BALANCED SALT

## (undated) DEVICE — SOL BETADINE 5%

## (undated) DEVICE — ELECTRODE REM PLYHSV RETURN 9

## (undated) DEVICE — SYR 10CC LUER LOCK

## (undated) DEVICE — COVER LIGHT HANDLE 80/CA

## (undated) DEVICE — NDL STRAIGHT 4CM LEIBINGER

## (undated) DEVICE — DROPPER MEDICINE

## (undated) DEVICE — PENCIL ROCKER SWITCH 10FT CORD

## (undated) DEVICE — GOWN SURGICAL X-LARGE

## (undated) DEVICE — SHEET EENT SPLIT

## (undated) DEVICE — DRAPE STERI-DRAPE 1000 17X11IN

## (undated) DEVICE — NDL HYPO A BEVEL 30X1/2